# Patient Record
Sex: FEMALE | Race: BLACK OR AFRICAN AMERICAN | Employment: FULL TIME | URBAN - METROPOLITAN AREA
[De-identification: names, ages, dates, MRNs, and addresses within clinical notes are randomized per-mention and may not be internally consistent; named-entity substitution may affect disease eponyms.]

---

## 2017-02-08 ENCOUNTER — OFFICE VISIT (OUTPATIENT)
Dept: FAMILY MEDICINE CLINIC | Age: 52
End: 2017-02-08

## 2017-02-08 VITALS
SYSTOLIC BLOOD PRESSURE: 148 MMHG | HEART RATE: 88 BPM | WEIGHT: 191.4 LBS | DIASTOLIC BLOOD PRESSURE: 74 MMHG | TEMPERATURE: 99.4 F | HEIGHT: 61 IN | RESPIRATION RATE: 18 BRPM | OXYGEN SATURATION: 98 % | BODY MASS INDEX: 36.14 KG/M2

## 2017-02-08 DIAGNOSIS — R05.9 COUGH: Primary | ICD-10-CM

## 2017-02-08 DIAGNOSIS — J32.9 SINUSITIS, UNSPECIFIED CHRONICITY, UNSPECIFIED LOCATION: Primary | ICD-10-CM

## 2017-02-08 RX ORDER — PROMETHAZINE HYDROCHLORIDE AND DEXTROMETHORPHAN HYDROBROMIDE 6.25; 15 MG/5ML; MG/5ML
SYRUP ORAL
Qty: 180 ML | Refills: 1 | Status: SHIPPED | OUTPATIENT
Start: 2017-02-08 | End: 2017-02-17 | Stop reason: ALTCHOICE

## 2017-02-08 RX ORDER — OFLOXACIN 3 MG/ML
3 SOLUTION/ DROPS OPHTHALMIC 4 TIMES DAILY
COMMUNITY
End: 2017-05-16 | Stop reason: ALTCHOICE

## 2017-02-08 RX ORDER — ALBUTEROL SULFATE 90 UG/1
2 AEROSOL, METERED RESPIRATORY (INHALATION)
Qty: 1 INHALER | Refills: 0 | Status: SHIPPED | OUTPATIENT
Start: 2017-02-08 | End: 2017-05-16

## 2017-02-08 RX ORDER — PEN NEEDLE, DIABETIC 32GX 5/32"
NEEDLE, DISPOSABLE MISCELLANEOUS
COMMUNITY
Start: 2016-11-18 | End: 2018-03-28 | Stop reason: SDUPTHER

## 2017-02-08 RX ORDER — BLOOD SUGAR DIAGNOSTIC
STRIP MISCELLANEOUS
COMMUNITY
Start: 2016-11-18 | End: 2018-12-14 | Stop reason: ALTCHOICE

## 2017-02-08 RX ORDER — CODEINE PHOSPHATE AND GUAIFENESIN 10; 100 MG/5ML; MG/5ML
SOLUTION ORAL
Qty: 120 ML | Refills: 0 | Status: SHIPPED | OUTPATIENT
Start: 2017-02-08 | End: 2017-02-17 | Stop reason: ALTCHOICE

## 2017-02-08 RX ORDER — PREDNISOLONE ACETATE 10 MG/ML
1 SUSPENSION/ DROPS OPHTHALMIC 4 TIMES DAILY
COMMUNITY
End: 2017-05-16 | Stop reason: ALTCHOICE

## 2017-02-08 RX ORDER — AZITHROMYCIN 250 MG/1
TABLET, FILM COATED ORAL
Qty: 6 TAB | Refills: 0 | Status: SHIPPED | OUTPATIENT
Start: 2017-02-08 | End: 2017-02-17 | Stop reason: ALTCHOICE

## 2017-02-08 NOTE — MR AVS SNAPSHOT
Visit Information Date & Time Provider Department Dept. Phone Encounter #  
 2/8/2017 10:00 AM Carmina Garcia MD [address] OFF[facility] [phone] [id number] Upcoming Health Maintenance Date Due  
 FOOT EXAM Q1 12/5/1975 EYE EXAM RETINAL OR DILATED Q1 12/5/1975 Pneumococcal 19-64 Medium Risk (1 of 1 - PPSV23) 12/5/1984 PAP AKA CERVICAL CYTOLOGY 12/5/1986 LIPID PANEL Q1 2/25/2011 DTaP/Tdap/Td series (1 - Tdap) 5/5/2013 BREAST CANCER SCRN MAMMOGRAM 12/5/2015 FOBT Q 1 YEAR AGE 50-75 12/5/2015 INFLUENZA AGE 9 TO ADULT 8/1/2016 HEMOGLOBIN A1C Q6M 11/26/2016 MICROALBUMIN Q1 5/26/2017 Allergies as of 2/8/2017  Review Complete On: 2/8/2017 By: Efren Palacio LPN Severity Noted Reaction Type Reactions Percocet [Oxycodone-acetaminophen]  05/04/2013    Other (comments) AMS Current Immunizations  Never Reviewed Name Date Td, Adsorbed PF 5/4/2013  7:32 PM  
  
 Not reviewed this visit You Were Diagnosed With   
  
 Codes Comments Sinusitis, unspecified chronicity, unspecified location    -  Primary ICD-10-CM: J32.9 ICD-9-CM: 473.9 Vitals BP Pulse Temp Resp Height(growth percentile) Weight(growth percentile) 148/74 (BP 1 Location: Right arm, BP Patient Position: Sitting) 88 99.4 °F (37.4 °C) (Oral) 18 5' 1\" (1.549 m) 191 lb 6.4 oz (86.8 kg) SpO2 BMI OB Status Smoking Status 98% 36.16 kg/m2 Hysterectomy Never Smoker Vitals History BMI and BSA Data Body Mass Index Body Surface Area  
 36.16 kg/m 2 1.93 m 2 Preferred Pharmacy Pharmacy Name Phone [facility] [address] [phone] Your Updated Medication List  
  
   
This list is accurate as of: 2/8/17 10:49 AM.  Always use your most recent med list.  
  
  
  
  
 albuterol 90 mcg/actuation inhaler Commonly known as:  PROVENTIL HFA, VENTOLIN HFA, PROAIR HFA Take 2 Puffs by inhalation every six (6) hours as needed for Wheezing. atorvastatin 40 mg tablet Commonly known as:  LIPITOR Take 1 Tab by mouth daily. azithromycin 250 mg tablet Commonly known as:  [place] Take 2 tablets today, then take 1 tablet daily  
  
 cyanocobalamin 1,000 mcg/mL injection Commonly known as:  VITAMIN B12  
1,000 mcg by IntraMUSCular route every thirty (30) days. ergocalciferol 50,000 unit capsule Commonly known as:  ERGOCALCIFEROL Take 50,000 Units by mouth every seven (7) days. ibuprofen 800 mg tablet Commonly known as:  MOTRIN  
1 po bid prn pain with food  
  
 insulin glargine 100 unit/mL (3 mL) pen Commonly known as:  LANTUS SOLOSTAR  
70 units twice daily  
  
 insulin lispro 100 unit/mL kwikpen Commonly known as:  HUMALOG Sliding Scale for Blood Sugar above 300  
  
 lisinopril 20 mg tablet Commonly known as:  Orquidea Sotelo Take 1 Tab by mouth daily. Swapna Pen Needle 32 gauge x 5/32\" Ndle Generic drug:  Insulin Needles (Disposable)  
  
 ofloxacin 0.3 % ophthalmic solution Commonly known as:  FLOXIN Administer 3 Drops to both eyes four (4) times daily. omeprazole 20 mg capsule Commonly known as:  PRILOSEC Take 20 mg by mouth daily. ONETOUCH ULTRA TEST strip Generic drug:  glucose blood VI test strips  
  
 prednisoLONE acetate 1 % ophthalmic suspension Commonly known as:  PRED FORTE Administer 1 Drop to both eyes four (4) times daily. promethazine-dextromethorphan 6.25-15 mg/5 mL syrup Commonly known as:  PROMETHAZINE-DM  
1 teasp po qid prn cough  
  
 raNITIdine 150 mg tablet Commonly known as:  ZANTAC Take 1 Tab by mouth two (2) times a day. Prescriptions Sent to Pharmacy Refills  
 azithromycin (ZITHROMAX) 250 mg tablet 0 Sig: Take 2 tablets today, then take 1 tablet daily  Class: Normal  
 Pharmacy: [facility] [address] [place] [address] Ph #: [phone]  
 promethazine-dextromethorphan (PROMETHAZINE-DM) 6.25-15 mg/5 mL syrup 1 Si teasp po qid prn cough Class: Normal  
 Pharmacy: [facility] [address] [facility] Ph #: [phone]  
 albuterol (PROVENTIL HFA, VENTOLIN HFA, PROAIR HFA) 90 mcg/actuation inhaler 0 Sig: Take 2 Puffs by inhalation every six (6) hours as needed for Wheezing. Class: Normal  
 Pharmacy: [facility] [address], [address] Ph #: [phone] Route: Inhalation Introducing [facility]! Siva Gomez introduces [employer] patient portal. Now you can access parts of your medical record, email your doctor's office, and request medication refills online. 1. In your internet browser, go to [email or web address] 2. Click on the First Time User? Click Here link in the Sign In box. You will see the New Member Sign Up page. 3. Enter your [employer] Access Code exactly as it appears below. You will not need to use this code after youve completed the sign-up process. If you do not sign up before the expiration date, you must request a new code. · [employer] Access Code: [email or web address] Expires: 2017 10:10 AM 
 
4. Enter the last four digits of your Social Security Number (xxxx) and Date of Birth (mm/dd/yyyy) as indicated and click Submit. You will be taken to the next sign-up page. 5. Create a [employer] ID. This will be your [employer] login ID and cannot be changed, so think of one that is secure and easy to remember. 6. Create a [employer] password. You can change your password at any time. 7. Enter your Password Reset Question and Answer. This can be used at a later time if you forget your password. 8. Enter your e-mail address. You will receive e-mail notification when new information is available in [address] 9. Click Sign Up. You can now view and download portions of your medical record. 10. Click the Download Summary menu link to download a portable copy of your medical information. If you have questions, please visit the Frequently Asked Questions section of the [employer] website. Remember, [employer] is NOT to be used for urgent needs. For medical emergencies, dial 911. Now available from your iPhone and Android! Please provide this summary of care documentation to your next provider. Your primary care clinician is listed as Alexander Washburn. If you have any questions after today's visit, please call [phone].

## 2017-02-08 NOTE — TELEPHONE ENCOUNTER
Che [phone], pharm states the EScript on the cough syrup  Does not come in \"sugar free\"  , do they still need it filled?

## 2017-02-08 NOTE — PROGRESS NOTES
Chief Complaint   Patient presents with    Sinus Infection     Has sinus pressure & drainage. Cough & congestion.

## 2017-02-08 NOTE — PROGRESS NOTES
HISTORY OF PRESENT ILLNESS  Darby Montgomery is a 46 y.o. female here with 3 day h/o sinus congestion, facial pressure, ear pressure, non productive cough, occ wheezing and now fever that started today. No sick contacts. She has been taking OTC Advil Cold and Sinus with little relief. She has h/o uncontrolled diabetes and is followed by her endocrinologist Dr. aMxx Roman and just saw her 2 weeks ago, she is still not controlled and they are adjusting her medications and following her closely. Sinus Infection    The history is provided by the patient. This is a new problem. The current episode started more than 2 days ago. The problem has been gradually worsening. Patient reports a subjective fever - was not measured. The fever has been present for less than 1 day. Associated symptoms include cough. Pertinent negatives include no shortness of breath and no chest pain. Review of Systems   Constitutional: Positive for fever. Respiratory: Positive for cough and wheezing. Negative for sputum production and shortness of breath. Cardiovascular: Negative for chest pain. Physical Exam   Constitutional: She is oriented to person, place, and time. She appears well-developed and well-nourished. /74 (BP 1 Location: Right arm, BP Patient Position: Sitting)  Pulse 88  Temp 99.4 °F (37.4 °C) (Oral)   Resp 18  Ht 5' 1\" (1.549 m)  Wt 191 lb 6.4 oz (86.8 kg)  SpO2 98%  BMI 36.16 kg/m2     HENT:   Head: Normocephalic and atraumatic. Mouth/Throat: Oropharynx is clear and moist.   TMs clear bilaterally, reports facial tenderness over maxillary sinus bilaterally right > left   Cardiovascular: Normal heart sounds. Pulmonary/Chest: Breath sounds normal. She has no wheezes. Neurological: She is alert and oriented to person, place, and time. Nursing note and vitals reviewed.     Patient Active Problem List   Diagnosis Code    Type II diabetes mellitus, uncontrolled ([facility] [address] E11.65    Gastroesophageal reflux disease without esophagitis K21.9    History of hysterectomy for benign disease Z90.710     Past Medical History   Diagnosis Date    Diabetes ([facility] [address]      Social History     Social History    Marital status:      Spouse name: N/A    Number of children: N/A    Years of education: N/A     Social History Main Topics    Smoking status: Never Smoker    Smokeless tobacco: Never Used    Alcohol use No    Drug use: No    Sexual activity: Not Asked     Other Topics Concern    None     Social History Narrative     No family history on file. Current Outpatient Prescriptions   Medication Sig    azithromycin (ZITHROMAX) 250 mg tablet Take 2 tablets today, then take 1 tablet daily    promethazine-dextromethorphan (PROMETHAZINE-DM) 6.25-15 mg/5 mL syrup 1 teasp po qid prn cough    albuterol (PROVENTIL HFA, VENTOLIN HFA, PROAIR HFA) 90 mcg/actuation inhaler Take 2 Puffs by inhalation every six (6) hours as needed for Wheezing.  ergocalciferol (ERGOCALCIFEROL) 50,000 unit capsule Take 50,000 Units by mouth every seven (7) days.  cyanocobalamin (VITAMIN B12) 1,000 mcg/mL injection 1,000 mcg by IntraMUSCular route every thirty (30) days.  omeprazole (PRILOSEC) 20 mg capsule Take 20 mg by mouth daily.  atorvastatin (LIPITOR) 40 mg tablet Take 1 Tab by mouth daily.  lisinopril (PRINIVIL, ZESTRIL) 20 mg tablet Take 1 Tab by mouth daily.  insulin glargine (LANTUS SOLOSTAR) 100 unit/mL (3 mL) pen 70 units twice daily    insulin lispro (HUMALOG) 100 unit/mL kwikpen Sliding Scale for Blood Sugar above 300    ibuprofen (MOTRIN) 800 mg tablet 1 po bid prn pain with food    ranitidine (ZANTAC) 150 mg tablet Take 1 Tab by mouth two (2) times a day.  ONETOUCH ULTRA TEST strip     PER PEN NEEDLE 32 gauge x 5/32\" ndle     ofloxacin (FLOXIN) 0.3 % ophthalmic solution Administer 3 Drops to both eyes four (4) times daily.     prednisoLONE acetate (PRED FORTE) 1 % ophthalmic suspension Administer 1 Drop to both eyes four (4) times daily. Allergies   Allergen Reactions    Percocet [Oxycodone-Acetaminophen] Other (comments)     AMS       ASSESSMENT and PLAN  Treatment as documented, cont to follow w Endocrinology. Care plan reviewed and pt understands. After visit summary printed and reviewed with patient. Ami Bullock was seen today for sinus infection. Diagnoses and all orders for this visit:    Sinusitis, unspecified chronicity, unspecified location  -     azithromycin (ZITHROMAX) 250 mg tablet; Take 2 tablets today, then take 1 tablet daily  -     promethazine-dextromethorphan (PROMETHAZINE-DM) 6.25-15 mg/5 mL syrup; 1 teasp po qid prn cough  -     albuterol (PROVENTIL HFA, VENTOLIN HFA, PROAIR HFA) 90 mcg/actuation inhaler; Take 2 Puffs by inhalation every six (6) hours as needed for Wheezing.

## 2017-02-14 ENCOUNTER — TELEPHONE (OUTPATIENT)
Dept: FAMILY MEDICINE CLINIC | Age: 52
End: 2017-02-14

## 2017-02-17 ENCOUNTER — OFFICE VISIT (OUTPATIENT)
Dept: FAMILY MEDICINE CLINIC | Age: 52
End: 2017-02-17

## 2017-02-17 VITALS
WEIGHT: 186 LBS | BODY MASS INDEX: 35.12 KG/M2 | TEMPERATURE: 98.3 F | RESPIRATION RATE: 20 BRPM | HEIGHT: 61 IN | SYSTOLIC BLOOD PRESSURE: 143 MMHG | HEART RATE: 100 BPM | DIASTOLIC BLOOD PRESSURE: 83 MMHG | OXYGEN SATURATION: 98 %

## 2017-02-17 DIAGNOSIS — J40 BRONCHITIS: Primary | ICD-10-CM

## 2017-02-17 DIAGNOSIS — R05.9 COUGH: ICD-10-CM

## 2017-02-17 RX ORDER — PROMETHAZINE HYDROCHLORIDE AND CODEINE PHOSPHATE 6.25; 1 MG/5ML; MG/5ML
5 SOLUTION ORAL
Qty: 240 ML | Refills: 0 | Status: SHIPPED | OUTPATIENT
Start: 2017-02-17 | End: 2017-03-01 | Stop reason: ALTCHOICE

## 2017-02-17 RX ORDER — PREDNISONE 10 MG/1
TABLET ORAL
Qty: 21 TAB | Refills: 0 | Status: SHIPPED | OUTPATIENT
Start: 2017-02-17 | End: 2017-03-01 | Stop reason: ALTCHOICE

## 2017-02-17 NOTE — PATIENT INSTRUCTIONS
Bronchitis: Care Instructions  Your Care Instructions    Bronchitis is inflammation of the bronchial tubes, which carry air to the lungs. The tubes swell and produce mucus, or phlegm. The mucus and inflamed bronchial tubes make you cough. You may have trouble breathing. Most cases of bronchitis are caused by viruses like those that cause colds. Antibiotics usually do not help and they may be harmful. Bronchitis usually develops rapidly and lasts about 2 to 3 weeks in otherwise healthy people. Follow-up care is a key part of your treatment and safety. Be sure to make and go to all appointments, and call your doctor if you are having problems. It's also a good idea to know your test results and keep a list of the medicines you take. How can you care for yourself at home? · Take all medicines exactly as prescribed. Call your doctor if you think you are having a problem with your medicine. · Get some extra rest.  · Take an over-the-counter pain medicine, such as acetaminophen (Tylenol), ibuprofen (Advil, Motrin), or naproxen (Aleve) to reduce fever and relieve body aches. Read and follow all instructions on the label. · Do not take two or more pain medicines at the same time unless the doctor told you to. Many pain medicines have acetaminophen, which is Tylenol. Too much acetaminophen (Tylenol) can be harmful. · Take an over-the-counter cough medicine that contains dextromethorphan to help quiet a dry, hacking cough so that you can sleep. Avoid cough medicines that have more than one active ingredient. Read and follow all instructions on the label. · Breathe moist air from a humidifier, hot shower, or sink filled with hot water. The heat and moisture will thin mucus so you can cough it out. · Do not smoke. Smoking can make bronchitis worse. If you need help quitting, talk to your doctor about stop-smoking programs and medicines. These can increase your chances of quitting for good.   When should you call for help? Call 911 anytime you think you may need emergency care. For example, call if:  · You have severe trouble breathing. Call your doctor now or seek immediate medical care if:  · You have new or worse trouble breathing. · You cough up dark brown or bloody mucus (sputum). · You have a new or higher fever. · You have a new rash. Watch closely for changes in your health, and be sure to contact your doctor if:  · You cough more deeply or more often, especially if you notice more mucus or a change in the color of your mucus. · You are not getting better as expected. Where can you learn more? Go to http://lópez-shiraz.info/. Enter H333 in the search box to learn more about \"Bronchitis: Care Instructions. \"  Current as of: May 23, 2016  Content Version: 11.1  © 9642-9446 [employer]. Care instructions adapted under license by [employer] (which disclaims liability or warranty for this information). If you have questions about a medical condition or this instruction, always ask your healthcare professional. [address] any warranty or liability for your use of this information.

## 2017-02-17 NOTE — PROGRESS NOTES
HISTORY OF PRESENT ILLNESS  Telly Juarez is a 46 y.o. female. HPI  Patient comes in today for cough. Patient's PCP - Dr. Susanna Schroeder. Started with dry cough 1.5 weeks ago with wheezing, dyspnea. Albuterol inhaler with some relief. No fever, chills. Had fever last week but none since. Cough medicine did not help. Nonsmoker, no smoking exposure. No environmental exposures. No hx of asthma or allergies. Some reflux symptoms. Chest and lower stomach feel like on fire. States little better from last week, states sinus pressure better. Cough is worse at night. Just finished zpak ~1 week ago. Allergies   Allergen Reactions    Percocet [Oxycodone-Acetaminophen] Other (comments)     AMS       Past Medical History   Diagnosis Date    Diabetes [facility])        Past Surgical History   Procedure Laterality Date    Hx breast reduction      Hx gyn       c section    Hx colonoscopy         Social History     Social History    Marital status:      Spouse name: N/A    Number of children: N/A    Years of education: N/A     Occupational History    Not on file. Social History Main Topics    Smoking status: Never Smoker    Smokeless tobacco: Never Used    Alcohol use No    Drug use: No    Sexual activity: Not on file     Other Topics Concern    Not on file     Social History Narrative       History reviewed. No pertinent family history. Current Outpatient Prescriptions   Medication Sig    ONETOUCH ULTRA TEST strip     PER PEN NEEDLE 32 gauge x 5/32\" ndle     ofloxacin (FLOXIN) 0.3 % ophthalmic solution Administer 3 Drops to both eyes four (4) times daily.  prednisoLONE acetate (PRED FORTE) 1 % ophthalmic suspension Administer 1 Drop to both eyes four (4) times daily.     promethazine-dextromethorphan (PROMETHAZINE-DM) 6.25-15 mg/5 mL syrup 1 teasp po qid prn cough    albuterol (PROVENTIL HFA, VENTOLIN HFA, PROAIR HFA) 90 mcg/actuation inhaler Take 2 Puffs by inhalation every six (6) hours as needed for Wheezing.  guaiFENesin-codeine (ROBITUSSIN AC) 100-10 mg/5 mL solution 1 or 2 teasp qhs prn cough    ergocalciferol (ERGOCALCIFEROL) 50,000 unit capsule Take 50,000 Units by mouth every seven (7) days.  cyanocobalamin (VITAMIN B12) 1,000 mcg/mL injection 1,000 mcg by IntraMUSCular route every thirty (30) days.  omeprazole (PRILOSEC) 20 mg capsule Take 20 mg by mouth daily.  atorvastatin (LIPITOR) 40 mg tablet Take 1 Tab by mouth daily.  lisinopril (PRINIVIL, ZESTRIL) 20 mg tablet Take 1 Tab by mouth daily.  insulin glargine (LANTUS SOLOSTAR) 100 unit/mL (3 mL) pen 70 units twice daily    insulin lispro (HUMALOG) 100 unit/mL kwikpen Sliding Scale for Blood Sugar above 300    ibuprofen (MOTRIN) 800 mg tablet 1 po bid prn pain with food    ranitidine (ZANTAC) 150 mg tablet Take 1 Tab by mouth two (2) times a day. No current facility-administered medications for this visit. Review of Systems   Constitutional: Negative for chills, diaphoresis, fever and malaise/fatigue. HENT: Negative for congestion, ear pain, sore throat and tinnitus. Respiratory: Positive for cough, shortness of breath and wheezing. Negative for hemoptysis and sputum production. Cardiovascular: Negative for chest pain, palpitations and leg swelling. Gastrointestinal: Negative for abdominal pain, diarrhea, nausea and vomiting. Genitourinary: Negative for dysuria, frequency and urgency. Musculoskeletal: Negative for myalgias. Skin: Negative. Neurological: Negative for dizziness and headaches. Endo/Heme/Allergies: Negative for environmental allergies. Vitals:    02/17/17 1548   BP: 143/83   Pulse: 100   Resp: 20   Temp: 98.3 °F (36.8 °C)   TempSrc: Oral   SpO2: 98%   Weight: 186 lb (84.4 kg)   Height: 5' 1\" (1.549 m)     Physical Exam   Constitutional: She is oriented to person, place, and time. Vital signs are normal. She appears well-developed and well-nourished.  She is cooperative. HENT:   Right Ear: Hearing, tympanic membrane, external ear and ear canal normal.   Left Ear: Hearing, tympanic membrane, external ear and ear canal normal.   Nose: Nose normal. Right sinus exhibits no maxillary sinus tenderness and no frontal sinus tenderness. Left sinus exhibits no maxillary sinus tenderness and no frontal sinus tenderness. Mouth/Throat: Uvula is midline, oropharynx is clear and moist and mucous membranes are normal. Mucous membranes are not pale and not dry. No oropharyngeal exudate, posterior oropharyngeal edema or posterior oropharyngeal erythema. Cardiovascular: Normal rate, regular rhythm, S1 normal, S2 normal and normal heart sounds. Pulmonary/Chest: Effort normal. She has no decreased breath sounds. She has wheezes (few scattered expiratory wheezes). She has no rhonchi. She has no rales. Bronchospastic cough   Lymphadenopathy:        Head (right side): No submental, no submandibular, no tonsillar, no preauricular and no posterior auricular adenopathy present. Head (left side): No submental, no submandibular, no tonsillar, no preauricular and no posterior auricular adenopathy present. She has no cervical adenopathy. Right: No supraclavicular adenopathy present. Left: No supraclavicular adenopathy present. Neurological: She is alert and oriented to person, place, and time. Skin: Skin is warm, dry and intact. Psychiatric: She has a normal mood and affect. Her speech is normal and behavior is normal. Thought content normal.   Vitals reviewed. ASSESSMENT and PLAN    ICD-10-CM ICD-9-CM    1. Bronchitis J40 490 predniSONE (STERAPRED DS) 10 mg dose pack      promethazine-codeine (PHENERGAN WITH CODEINE) 6.25-10 mg/5 mL syrup   2. Cough R05 786.2 XR CHEST PA LAT     Encounter Diagnoses   Name Primary?     Bronchitis Yes    Cough      Orders Placed This Encounter    XR CHEST PA LAT    predniSONE (STERAPRED DS) 10 mg dose pack    promethazine-codeine (PHENERGAN WITH CODEINE) 6.25-10 mg/5 mL syrup     Tessy was seen today for cold symptoms. Diagnoses and all orders for this visit:    Bronchitis - patient just completed zpak, will order steroid taper, cough suppressant. RTC or go to ED if no improvement or condition worsens. -     predniSONE (STERAPRED DS) 10 mg dose pack; See administration instruction per 10mg dose pack  -     promethazine-codeine (PHENERGAN WITH CODEINE) 6.25-10 mg/5 mL syrup; Take 5 mL by mouth every six (6) hours as needed for Cough. Max Daily Amount: 20 mL. Cough  -     XR CHEST PA LAT; Future      Follow-up Disposition:  Return if symptoms worsen or fail to improve. radiology results and schedule of future radiology studies reviewed with patient    I have reviewed the patient's allergies and made any necessary changes. Medical, procedural, social and family histories have been reviewed and updated as medically indicated. I have reconciled and/or revised patient medications in the EMR. I have discussed each diagnosis listed in this note with Yadiel Park and/or their family. I have discussed treatment options and the risk/benefit analysis of those options, including safe use of medications and possible medication side effects. Through the use of shared decision making we have agreed to the above plan. The patient has received an after-visit summary and questions were answered concerning future plans. Katrina Nathan, BRYSON-C    This note will not be viewable in [employer].

## 2017-02-17 NOTE — MR AVS SNAPSHOT
Visit Information Date & Time Provider Department Dept. Phone Encounter #  
 2/17/2017  3:30 PM Manolo Yeung [phone] [id number] Follow-up Instructions Return if symptoms worsen or fail to improve. Upcoming Health Maintenance Date Due  
 FOOT EXAM Q1 12/5/1975 EYE EXAM RETINAL OR DILATED Q1 12/5/1975 PAP AKA CERVICAL CYTOLOGY 12/5/1986 LIPID PANEL Q1 2/25/2011 BREAST CANCER SCRN MAMMOGRAM 12/5/2015 FOBT Q 1 YEAR AGE 50-75 12/5/2015 HEMOGLOBIN A1C Q6M 11/26/2016 Pneumococcal 19-64 Medium Risk (1 of 1 - PPSV23) 3/31/2017* DTaP/Tdap/Td series (1 - Tdap) 3/31/2017* MICROALBUMIN Q1 5/26/2017 *Topic was postponed. The date shown is not the original due date. Allergies as of 2/17/2017  Review Complete On: 2/17/2017 By: Gail Puente LPN Severity Noted Reaction Type Reactions Percocet [Oxycodone-acetaminophen]  05/04/2013    Other (comments) AMS Current Immunizations  Never Reviewed Name Date Td, Adsorbed PF 5/4/2013  7:32 PM  
  
 Not reviewed this visit You Were Diagnosed With   
  
 Codes Comments Bronchitis    -  Primary ICD-10-CM: Z00 ICD-9-CM: 304 Cough     ICD-10-CM: R05 ICD-9-CM: 376. 2 Vitals BP Pulse Temp Resp Height(growth percentile) Weight(growth percentile) 143/83 100 98.3 °F (36.8 °C) (Oral) 20 5' 1\" (1.549 m) 186 lb (84.4 kg) SpO2 BMI OB Status Smoking Status 98% 35.14 kg/m2 Hysterectomy Never Smoker BMI and BSA Data Body Mass Index Body Surface Area  
 35.14 kg/m 2 1.91 m 2 Preferred Pharmacy Pharmacy Name Phone [facility] [address], [address] [place] [address] [phone] Your Updated Medication List  
  
   
This list is accurate as of: 2/17/17  4:16 PM.  Always use your most recent med list.  
  
  
  
  
 albuterol 90 mcg/actuation inhaler Commonly known as:  PROVENTIL HFA, VENTOLIN HFA, PROAIR HFA Take 2 Puffs by inhalation every six (6) hours as needed for Wheezing. atorvastatin 40 mg tablet Commonly known as:  LIPITOR Take 1 Tab by mouth daily. cyanocobalamin 1,000 mcg/mL injection Commonly known as:  VITAMIN B12  
1,000 mcg by IntraMUSCular route every thirty (30) days. ergocalciferol 50,000 unit capsule Commonly known as:  ERGOCALCIFEROL Take 50,000 Units by mouth every seven (7) days. ibuprofen 800 mg tablet Commonly known as:  MOTRIN  
1 po bid prn pain with food  
  
 insulin glargine 100 unit/mL (3 mL) pen Commonly known as:  LANTUS SOLOSTAR  
70 units twice daily  
  
 insulin lispro 100 unit/mL kwikpen Commonly known as:  HUMALOG Sliding Scale for Blood Sugar above 300  
  
 lisinopril 20 mg tablet Commonly known as:  Mera Reasons Take 1 Tab by mouth daily. Swapna Pen Needle 32 gauge x 5/32\" Ndle Generic drug:  Insulin Needles (Disposable)  
  
 ofloxacin 0.3 % ophthalmic solution Commonly known as:  FLOXIN Administer 3 Drops to both eyes four (4) times daily. omeprazole 20 mg capsule Commonly known as:  PRILOSEC Take 20 mg by mouth daily. ONETOUCH ULTRA TEST strip Generic drug:  glucose blood VI test strips  
  
 prednisoLONE acetate 1 % ophthalmic suspension Commonly known as:  PRED FORTE Administer 1 Drop to both eyes four (4) times daily. predniSONE 10 mg dose pack Commonly known as:  STERAPRED DS See administration instruction per 10mg dose pack  
  
 promethazine-codeine 6.25-10 mg/5 mL syrup Commonly known as:  PHENERGAN with CODEINE Take 5 mL by mouth every six (6) hours as needed for Cough. Max Daily Amount: 20 mL. raNITIdine 150 mg tablet Commonly known as:  ZANTAC Take 1 Tab by mouth two (2) times a day. Prescriptions Printed Refills promethazine-codeine (PHENERGAN WITH CODEINE) 6.25-10 mg/5 mL syrup 0 Sig: Take 5 mL by mouth every six (6) hours as needed for Cough. Max Daily Amount: 20 mL. Class: Print Route: Oral  
  
Prescriptions Sent to Pharmacy Refills  
 predniSONE (STERAPRED DS) 10 mg dose pack 0 Sig: See administration instruction per 10mg dose pack Class: Normal  
 Pharmacy: [facility] [address] [place] [address] [facility] #: [phone] Follow-up Instructions Return if symptoms worsen or fail to improve. To-Do List   
 02/17/2017 Imaging:  XR CHEST PA LAT Patient Instructions Bronchitis: Care Instructions Your Care Instructions Bronchitis is inflammation of the bronchial tubes, which carry air to the lungs. The tubes swell and produce mucus, or phlegm. The mucus and inflamed bronchial tubes make you cough. You may have trouble breathing. Most cases of bronchitis are caused by viruses like those that cause colds. Antibiotics usually do not help and they may be harmful. Bronchitis usually develops rapidly and lasts about 2 to 3 weeks in otherwise healthy people. Follow-up care is a key part of your treatment and safety. Be sure to make and go to all appointments, and call your doctor if you are having problems. It's also a good idea to know your test results and keep a list of the medicines you take. How can you care for yourself at home? · Take all medicines exactly as prescribed. Call your doctor if you think you are having a problem with your medicine. · Get some extra rest. 
· Take an over-the-counter pain medicine, such as acetaminophen (Tylenol), ibuprofen (Advil, Motrin), or naproxen (Aleve) to reduce fever and relieve body aches. Read and follow all instructions on the label.  
· Do not take two or more pain medicines at the same time unless the doctor told you to. Many pain medicines have acetaminophen, which is Tylenol. Too much acetaminophen (Tylenol) can be harmful. · Take an over-the-counter cough medicine that contains dextromethorphan to help quiet a dry, hacking cough so that you can sleep. Avoid cough medicines that have more than one active ingredient. Read and follow all instructions on the label. · Breathe moist air from a humidifier, hot shower, or sink filled with hot water. The heat and moisture will thin mucus so you can cough it out. · Do not smoke. Smoking can make bronchitis worse. If you need help quitting, talk to your doctor about stop-smoking programs and medicines. These can increase your chances of quitting for good. When should you call for help? Call 911 anytime you think you may need emergency care. For example, call if: 
· You have severe trouble breathing. Call your doctor now or seek immediate medical care if: 
· You have new or worse trouble breathing. · You cough up dark brown or bloody mucus (sputum). · You have a new or higher fever. · You have a new rash. Watch closely for changes in your health, and be sure to contact your doctor if: 
· You cough more deeply or more often, especially if you notice more mucus or a change in the color of your mucus. · You are not getting better as expected. Where can you learn more? Go to http://lópez-shiraz.info/. Enter H333 in the search box to learn more about \"Bronchitis: Care Instructions. \" Current as of: May 23, 2016 Content Version: 11.1 © 3508-5711 [employer]. Care instructions adapted under license by [employer] (which disclaims liability or warranty for this information). If you have questions about a medical condition or this instruction, always ask your healthcare professional. [place] [address] any warranty or liability for your use of this information. Introducing [facility]! [employer] introduces [facility] patient portal. Now you can access parts of your medical record, email your doctor's office, and request medication refills online. 1. In your internet browser, go to [email or web address] 2. Click on the First Time User? Click Here link in the Sign In box. You will see the New Member Sign Up page. 3. Enter your [facility] Access Code exactly as it appears below. You will not need to use this code after youve completed the sign-up process. If you do not sign up before the expiration date, you must request a new code. · [facility] Access Code: [email or web address] Expires: 5/9/2017 10:10 AM 
 
4. Enter the last four digits of your Social Security Number (xxxx) and Date of Birth (mm/dd/yyyy) as indicated and click Submit. You will be taken to the next sign-up page. 5. Create a [facility] ID. This will be your [facility] login ID and cannot be changed, so think of one that is secure and easy to remember. 6. Create a [facility] password. You can change your password at any time. 7. Enter your Password Reset Question and Answer. This can be used at a later time if you forget your password. 8. Enter your e-mail address. You will receive e-mail notification when new information is available in [address] 9. Click Sign Up. You can now view and download portions of your medical record. 10. Click the Download Summary menu link to download a portable copy of your medical information. If you have questions, please visit the Frequently Asked Questions section of the [facility] website. Remember, [facility] is NOT to be used for urgent needs. For medical emergencies, dial 911. Now available from your iPhone and Android! Please provide this summary of care documentation to your next provider. Your primary care clinician is listed as Tisha Shelton. If you have any questions after today's visit, please call [phone].

## 2017-02-28 ENCOUNTER — TELEPHONE (OUTPATIENT)
Dept: FAMILY MEDICINE CLINIC | Age: 52
End: 2017-02-28

## 2017-02-28 NOTE — TELEPHONE ENCOUNTER
Pt would like a return call to see if the fax for her eye surgery was received  Pt ph number is [phone]

## 2017-02-28 NOTE — TELEPHONE ENCOUNTER
Spoke with patient. She needed a pre-op exam this week for surgery. Explained no appt available this week. Asked when she got form & knew of surgery. She said over 3 weeks ago. Referred call to [facility]. Patient agreed to appt 3-14-17.

## 2017-03-01 ENCOUNTER — OFFICE VISIT (OUTPATIENT)
Dept: FAMILY MEDICINE CLINIC | Age: 52
End: 2017-03-01

## 2017-03-01 VITALS
BODY MASS INDEX: 35.91 KG/M2 | OXYGEN SATURATION: 99 % | DIASTOLIC BLOOD PRESSURE: 79 MMHG | SYSTOLIC BLOOD PRESSURE: 143 MMHG | HEART RATE: 90 BPM | HEIGHT: 61 IN | TEMPERATURE: 97.6 F | WEIGHT: 190.2 LBS | RESPIRATION RATE: 16 BRPM

## 2017-03-01 DIAGNOSIS — Z01.818 PRE-OP EXAM: Primary | ICD-10-CM

## 2017-03-01 LAB
GLUCOSE POC: 166 MG/DL
HBA1C MFR BLD HPLC: 11.6 %

## 2017-03-01 RX ORDER — TIZANIDINE 4 MG/1
TABLET ORAL
Refills: 0 | COMMUNITY
Start: 2017-02-08 | End: 2017-03-01 | Stop reason: ALTCHOICE

## 2017-03-01 RX ORDER — AZITHROMYCIN 250 MG/1
TABLET, FILM COATED ORAL
Refills: 0 | COMMUNITY
Start: 2017-02-08 | End: 2017-03-01 | Stop reason: ALTCHOICE

## 2017-03-01 NOTE — MR AVS SNAPSHOT
Visit Information Date & Time Provider Department Dept. Phone Encounter #  
 3/1/2017  8:00 AM Angélica Arreaga MD [address] [facility] [phone] [id number] Follow-up Instructions Return in about 1 month (around 4/1/2017) for follow up with fasting labs. Upcoming Health Maintenance Date Due  
 FOOT EXAM Q1 12/5/1975 EYE EXAM RETINAL OR DILATED Q1 12/5/1975 PAP AKA CERVICAL CYTOLOGY 12/5/1986 LIPID PANEL Q1 2/25/2011 FOBT Q 1 YEAR AGE 50-75 12/5/2015 HEMOGLOBIN A1C Q6M 11/26/2016 Pneumococcal 19-64 Medium Risk (1 of 1 - PPSV23) 3/31/2017* DTaP/Tdap/Td series (1 - Tdap) 3/31/2017* MICROALBUMIN Q1 5/26/2017 BREAST CANCER SCRN MAMMOGRAM 1/1/2018 *Topic was postponed. The date shown is not the original due date. Allergies as of 3/1/2017  Review Complete On: 3/1/2017 By: Ladonna Dennis LPN Severity Noted Reaction Type Reactions Percocet [Oxycodone-acetaminophen]  05/04/2013    Other (comments) AMS Current Immunizations  Never Reviewed Name Date Td, Adsorbed PF 5/4/2013  7:32 PM  
  
 Not reviewed this visit You Were Diagnosed With   
  
 Codes Comments Pre-op exam    -  Primary ICD-10-CM: H55.287 ICD-9-CM: V72.84 Vitals BP  
  
  
  
  
  
 143/79 (BP 1 Location: Right arm, BP Patient Position: Sitting) Vitals History BMI and BSA Data Body Mass Index Body Surface Area 35.94 kg/m 2 1.93 m 2 Preferred Pharmacy Pharmacy Name Phone [facility] [address] [phone] Your Updated Medication List  
  
   
This list is accurate as of: 3/1/17 10:49 AM.  Always use your most recent med list.  
  
  
  
  
 albuterol 90 mcg/actuation inhaler Commonly known as:  PROVENTIL HFA, VENTOLIN HFA, PROAIR HFA Take 2 Puffs by inhalation every six (6) hours as needed for Wheezing. atorvastatin 40 mg tablet Commonly known as:  LIPITOR Take 1 Tab by mouth daily. cyanocobalamin 1,000 mcg/mL injection Commonly known as:  VITAMIN B12  
1,000 mcg by IntraMUSCular route every thirty (30) days. ergocalciferol 50,000 unit capsule Commonly known as:  ERGOCALCIFEROL Take 50,000 Units by mouth every seven (7) days. ibuprofen 800 mg tablet Commonly known as:  MOTRIN  
1 po bid prn pain with food  
  
 insulin glargine 100 unit/mL (3 mL) pen Commonly known as:  LANTUS SOLOSTAR  
70 units twice daily  
  
 insulin lispro 100 unit/mL kwikpen Commonly known as:  HUMALOG Sliding Scale for Blood Sugar above 300  
  
 lisinopril 20 mg tablet Commonly known as:  Mark Shutters Take 1 Tab by mouth daily. Swapna Pen Needle 32 gauge x 5/32\" Ndle Generic drug:  Insulin Needles (Disposable)  
  
 ofloxacin 0.3 % ophthalmic solution Commonly known as:  FLOXIN Administer 3 Drops to both eyes four (4) times daily. omeprazole 20 mg capsule Commonly known as:  PRILOSEC Take 20 mg by mouth daily. ONETOUCH ULTRA TEST strip Generic drug:  glucose blood VI test strips  
  
 prednisoLONE acetate 1 % ophthalmic suspension Commonly known as:  PRED FORTE Administer 1 Drop to both eyes four (4) times daily. raNITIdine 150 mg tablet Commonly known as:  ZANTAC Take 1 Tab by mouth two (2) times a day. We Performed the Following AMB POC GLUCOSE BLOOD, BY GLUCOSE MONITORING DEVICE [78594 CPT(R)] AMB POC HEMOGLOBIN A1C [58849 CPT(R)] Follow-up Instructions Return in about 1 month (around 4/1/2017) for follow up with fasting labs. Introducing [facility]! Dominique Yousif introduces [employer] patient portal. Now you can access parts of your medical record, email your doctor's office, and request medication refills online. 1. In your internet browser, go to [email or web address] 2. Click on the First Time User? Click Here link in the Sign In box. You will see the New Member Sign Up page. 3. Enter your [email or web address] Access Code exactly as it appears below. You will not need to use this code after youve completed the sign-up process. If you do not sign up before the expiration date, you must request a new code. · [employer] Access Code: [email or web address] Expires: 5/9/2017 10:10 AM 
 
4. Enter the last four digits of your Social Security Number (xxxx) and Date of Birth (mm/dd/yyyy) as indicated and click Submit. You will be taken to the next sign-up page. 5. Create a [employer] ID. This will be your [employer] login ID and cannot be changed, so think of one that is secure and easy to remember. 6. Create a [email or web address] password. You can change your password at any time. 7. Enter your Password Reset Question and Answer. This can be used at a later time if you forget your password. 8. Enter your e-mail address. You will receive e-mail notification when new information is available in [address] 9. Click Sign Up. You can now view and download portions of your medical record. 10. Click the Download Summary menu link to download a portable copy of your medical information. If you have questions, please visit the Frequently Asked Questions section of the [email or web address] website. Remember, [email or web address] is NOT to be used for urgent needs. For medical emergencies, dial 911. Now available from your iPhone and Android! Please provide this summary of care documentation to your next provider. Your primary care clinician is listed as Ansley Faustin. If you have any questions after today's visit, please call [phone].

## 2017-03-01 NOTE — PROGRESS NOTES
HISTORY OF PRESENT ILLNESS  May Hightower is a 46 y.o. female here today for preop eval prior to eye surgery with Dr. Tracy Poe. I evaluated pt for the first time back in May 2016, her A1c at that time was 12.1%. Since that time she has been seeing her endocrinologist Dr. Saturnino Edwards and is still trying to get her diabetes under control w her Humalog and Lantus. Pt is describing lows in the 60 range and is symptomatic. Next appt with Dr. Saturnino Edwards is the end of this month and she is sending in home glucose readings to her in the interim. Pt has labs with Dr. Saturnino Edwards but doesn't know which ones, I would like her to either have those labs sent over to me or come in for follow up with me fasting so that we can check lipids, renal function and liver function on statin. Today her glucose is 166 and A1c is 11.6%. I have communicated this with Dr. Adrian Calloway today by phone. BP is okay but borderline in the 150/80 range. Pre-op Exam   The history is provided by the patient. Pertinent negatives include no chest pain and no shortness of breath. Review of Systems   Constitutional: Negative for fever. Respiratory: Negative for shortness of breath. Cardiovascular: Negative for chest pain. Gastrointestinal: Negative. Neurological: Negative. The remainder of systems are reviewed and are negative. Physical Exam   Constitutional: She is oriented to person, place, and time. She appears well-developed and well-nourished. /79 (BP 1 Location: Right arm, BP Patient Position: Sitting)  Pulse 90  Temp 97.6 °F (36.4 °C) (Oral)   Resp 16  Ht 5' 1\" (1.549 m)  Wt 190 lb 3.2 oz (86.3 kg)  LMP  (LMP Unknown)  SpO2 99%  BMI 35.94 kg/m2     HENT:   Head: Normocephalic and atraumatic. Neck: No thyromegaly present. Cardiovascular: Normal heart sounds. Pulmonary/Chest: Breath sounds normal.   Abdominal: Soft. There is no tenderness. Musculoskeletal: She exhibits no edema.    Lymphadenopathy:     She has no cervical adenopathy. Neurological: She is alert and oriented to person, place, and time. Nursing note and vitals reviewed. Patient Active Problem List   Diagnosis Code    Type II diabetes mellitus, uncontrolled ([facility] E11.65    Gastroesophageal reflux disease without esophagitis K21.9    History of hysterectomy for benign disease Z90.710     Past Medical History:   Diagnosis Date    Diabetes ([facility]      Social History     Social History    Marital status:      Spouse name: N/A    Number of children: N/A    Years of education: N/A     Social History Main Topics    Smoking status: Never Smoker    Smokeless tobacco: Never Used    Alcohol use No    Drug use: No    Sexual activity: Not Asked     Other Topics Concern    None     Social History Narrative     No family history on file. Current Outpatient Prescriptions   Medication Sig    ONETOUCH ULTRA TEST strip     PER PEN NEEDLE 32 gauge x 5/32\" ndle     albuterol (PROVENTIL HFA, VENTOLIN HFA, PROAIR HFA) 90 mcg/actuation inhaler Take 2 Puffs by inhalation every six (6) hours as needed for Wheezing.  ergocalciferol (ERGOCALCIFEROL) 50,000 unit capsule Take 50,000 Units by mouth every seven (7) days.  cyanocobalamin (VITAMIN B12) 1,000 mcg/mL injection 1,000 mcg by IntraMUSCular route every thirty (30) days.  omeprazole (PRILOSEC) 20 mg capsule Take 20 mg by mouth daily.  atorvastatin (LIPITOR) 40 mg tablet Take 1 Tab by mouth daily.  lisinopril (PRINIVIL, ZESTRIL) 20 mg tablet Take 1 Tab by mouth daily.  insulin glargine (LANTUS SOLOSTAR) 100 unit/mL (3 mL) pen 70 units twice daily    insulin lispro (HUMALOG) 100 unit/mL kwikpen Sliding Scale for Blood Sugar above 300    ibuprofen (MOTRIN) 800 mg tablet 1 po bid prn pain with food    ranitidine (ZANTAC) 150 mg tablet Take 1 Tab by mouth two (2) times a day.  ofloxacin (FLOXIN) 0.3 % ophthalmic solution Administer 3 Drops to both eyes four (4) times daily.     prednisoLONE acetate (PRED FORTE) 1 % ophthalmic suspension Administer 1 Drop to both eyes four (4) times daily. Allergies   Allergen Reactions    Percocet [Oxycodone-Acetaminophen] Other (comments)     AMS       ASSESSMENT and PLAN  Uncontrolled diabetes affects her post surgical healing as I have discussed w Dr. Arely Esquivel and with patient. Asked pt to return for eval with labs 1mth, earlier if needed. Care plan reviewed and pt understands. After visit summary printed and reviewed with patient. Kristine Boyd was seen today for pre-op exam.    Diagnoses and all orders for this visit:    Pre-op exam  -     AMB POC GLUCOSE BLOOD, BY GLUCOSE MONITORING DEVICE  -     AMB POC HEMOGLOBIN A1C      Follow-up Disposition:  Return in about 1 month (around 4/1/2017) for follow up with fasting labs.   lab results and schedule of future lab studies reviewed with patient  specific diabetic recommendations: foot care discussed and Podiatry visits discussed, annual eye examinations at Ophthalmology discussed, glycohemoglobin and other lab monitoring discussed and long term diabetic complications discussed

## 2017-03-02 ENCOUNTER — TELEPHONE (OUTPATIENT)
Dept: FAMILY MEDICINE CLINIC | Age: 52
End: 2017-03-02

## 2017-03-02 NOTE — TELEPHONE ENCOUNTER
----- Message from Gael Torres sent at 3/2/2017 11:29 AM EST -----  Regarding: Dr. Gladys Page  Pt is returning a call back to practice. Best contact number [phone].

## 2017-04-27 ENCOUNTER — TELEPHONE (OUTPATIENT)
Dept: FAMILY MEDICINE CLINIC | Age: 52
End: 2017-04-27

## 2017-04-27 NOTE — TELEPHONE ENCOUNTER
----- Message from Vinicius Richardson sent at 4/27/2017 12:07 PM EDT -----  Regarding: Dr. Venkat Wells  Pt request a call regarding getting lab work. Pt can be reached at  [phone].

## 2017-05-16 ENCOUNTER — OFFICE VISIT (OUTPATIENT)
Dept: FAMILY MEDICINE CLINIC | Age: 52
End: 2017-05-16

## 2017-05-16 VITALS
SYSTOLIC BLOOD PRESSURE: 166 MMHG | TEMPERATURE: 98.1 F | HEIGHT: 61 IN | RESPIRATION RATE: 18 BRPM | DIASTOLIC BLOOD PRESSURE: 80 MMHG | WEIGHT: 199.8 LBS | HEART RATE: 86 BPM | BODY MASS INDEX: 37.72 KG/M2 | OXYGEN SATURATION: 99 %

## 2017-05-16 DIAGNOSIS — E10.319: Primary | ICD-10-CM

## 2017-05-16 DIAGNOSIS — E10.65: Primary | ICD-10-CM

## 2017-05-16 DIAGNOSIS — E78.2 MIXED HYPERLIPIDEMIA: ICD-10-CM

## 2017-05-16 DIAGNOSIS — Z79.4 ENCOUNTER FOR LONG-TERM (CURRENT) USE OF INSULIN (HCC): ICD-10-CM

## 2017-05-16 DIAGNOSIS — I10 ESSENTIAL HYPERTENSION: ICD-10-CM

## 2017-05-16 DIAGNOSIS — K21.9 GASTROESOPHAGEAL REFLUX DISEASE WITHOUT ESOPHAGITIS: ICD-10-CM

## 2017-05-16 DIAGNOSIS — Z23 ENCOUNTER FOR IMMUNIZATION: ICD-10-CM

## 2017-05-16 DIAGNOSIS — E53.8 B12 DEFICIENCY: ICD-10-CM

## 2017-05-16 DIAGNOSIS — Z51.81 ENCOUNTER FOR MEDICATION MONITORING: ICD-10-CM

## 2017-05-16 DIAGNOSIS — Z12.31 ENCOUNTER FOR SCREENING MAMMOGRAM FOR BREAST CANCER: ICD-10-CM

## 2017-05-16 LAB
BILIRUB UR QL STRIP: NEGATIVE
GLUCOSE POC: ABNORMAL MG/DL
GLUCOSE UR-MCNC: NORMAL MG/DL
HBA1C MFR BLD HPLC: 8.3 %
KETONES P FAST UR STRIP-MCNC: NEGATIVE MG/DL
PH UR STRIP: 6 [PH] (ref 4.6–8)
PROT UR QL STRIP: NEGATIVE MG/DL
SP GR UR STRIP: 1.01 (ref 1–1.03)
UA UROBILINOGEN AMB POC: NORMAL (ref 0.2–1)
URINALYSIS CLARITY POC: CLEAR
URINALYSIS COLOR POC: YELLOW
URINE BLOOD POC: NEGATIVE
URINE LEUKOCYTES POC: NEGATIVE
URINE NITRITES POC: NEGATIVE

## 2017-05-16 RX ORDER — PREDNISONE 10 MG/1
TABLET ORAL
Refills: 0 | COMMUNITY
Start: 2017-02-17 | End: 2017-05-16 | Stop reason: ALTCHOICE

## 2017-05-16 RX ORDER — PROMETHAZINE HYDROCHLORIDE AND CODEINE PHOSPHATE 6.25; 1 MG/5ML; MG/5ML
5 SOLUTION ORAL
Refills: 0 | COMMUNITY
Start: 2017-02-17 | End: 2017-05-16

## 2017-05-16 RX ORDER — PANTOPRAZOLE SODIUM 40 MG/1
40 TABLET, DELAYED RELEASE ORAL DAILY
Qty: 30 TAB | Refills: 6 | Status: SHIPPED | OUTPATIENT
Start: 2017-05-16 | End: 2017-12-30 | Stop reason: SDUPTHER

## 2017-05-16 RX ORDER — OMEPRAZOLE 20 MG/1
20 CAPSULE, DELAYED RELEASE ORAL DAILY
Qty: 90 CAP | Refills: 3 | Status: SHIPPED | OUTPATIENT
Start: 2017-05-16 | End: 2017-05-16 | Stop reason: ALTCHOICE

## 2017-05-16 RX ORDER — IBUPROFEN 800 MG/1
800 TABLET ORAL
Qty: 60 TAB | Refills: 2 | Status: SHIPPED | OUTPATIENT
Start: 2017-05-16 | End: 2017-12-02 | Stop reason: SDUPTHER

## 2017-05-16 NOTE — MR AVS SNAPSHOT
Visit Information Date & Time Provider Department Dept. Phone Encounter #  
 5/16/2017  2:45 PM Apple BaronManolo [phone] [id number] Follow-up Instructions Return in about 3 months (around 8/16/2017). Upcoming Health Maintenance Date Due  
 FOOT EXAM Q1 12/5/1975 Pneumococcal 19-64 Medium Risk (1 of 1 - PPSV23) 12/5/1984 LIPID PANEL Q1 2/25/2011 DTaP/Tdap/Td series (1 - Tdap) 5/5/2013 FOBT Q 1 YEAR AGE 50-75 12/5/2015 MICROALBUMIN Q1 5/26/2017 INFLUENZA AGE 9 TO ADULT 8/1/2017 HEMOGLOBIN A1C Q6M 9/1/2017 BREAST CANCER SCRN MAMMOGRAM 1/1/2018 EYE EXAM RETINAL OR DILATED Q1 2/7/2018 PAP AKA CERVICAL CYTOLOGY 10/12/2018 Allergies as of 5/16/2017  Review Complete On: 5/16/2017 By: Apple Baron MD  
  
 Severity Noted Reaction Type Reactions Percocet [Oxycodone-acetaminophen]  05/04/2013    Other (comments) AMS Current Immunizations  Reviewed on 5/16/2017 Name Date Pneumococcal Polysaccharide (PPSV-23) 5/16/2017 Td, Adsorbed PF 5/4/2013  7:32 PM  
  
 Reviewed by Apple Baron MD on 5/16/2017 at  3:32 PM  
You Were Diagnosed With   
  
 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 
ICD-9-CM: 401.9 Mixed hyperlipidemia     ICD-10-CM: E78.2 ICD-9-CM: 272.2 Gastroesophageal reflux disease without esophagitis     ICD-10-CM: K21.9 ICD-9-CM: 530.81 Encounter for medication monitoring     ICD-10-CM: Z51.81 
ICD-9-CM: V58.83 Encounter for long-term (current) use of insulin ([facility] [address]     ICD-10-CM: Z79.4 ICD-9-CM: V58.67   
 B12 deficiency     ICD-10-CM: E53.8 ICD-9-CM: 266.2 Encounter for immunization     ICD-10-CM: D53 ICD-9-CM: V03.89   
  
 Vitals BP Pulse Temp Resp Height(growth percentile) Weight(growth percentile) 166/80 (BP 1 Location: Left arm, BP Patient Position: Sitting) 86 98.1 °F (36.7 °C) (Oral) 18 5' 1\" (1.549 m) 199 lb 12.8 oz (90.6 kg) LMP SpO2 BMI OB Status Smoking Status (LMP Unknown) 99% 37.75 kg/m2 Hysterectomy Never Smoker Vitals History BMI and BSA Data Body Mass Index Body Surface Area 37.75 kg/m 2 1.97 m 2 Preferred Pharmacy Pharmacy Name Phone [facility] [address], [facility] [address] [phone] Your Updated Medication List  
  
   
This list is accurate as of: 5/16/17  4:20 PM.  Always use your most recent med list.  
  
  
  
  
 atorvastatin 40 mg tablet Commonly known as:  LIPITOR Take 1 Tab by mouth daily. cyanocobalamin 1,000 mcg/mL injection Commonly known as:  VITAMIN B12  
1,000 mcg by IntraMUSCular route every thirty (30) days. ibuprofen 800 mg tablet Commonly known as:  MOTRIN Take 1 Tab by mouth two (2) times daily as needed for Pain. insulin glargine 100 unit/mL (3 mL) pen Commonly known as:  LANTUS SOLOSTAR  
70 units twice daily  
  
 insulin lispro 100 unit/mL kwikpen Commonly known as:  HUMALOG Sliding Scale for Blood Sugar above 300  
  
 lisinopril 20 mg tablet Commonly known as:  Catherine Gaster Take 1 Tab by mouth daily. Swapna Pen Needle 32 gauge x 5/32\" Ndle Generic drug:  Insulin Needles (Disposable) ONETOUCH ULTRA TEST strip Generic drug:  glucose blood VI test strips Use to check BS 3-4 times daily  
  
 pantoprazole 40 mg tablet Commonly known as:  PROTONIX Take 1 Tab by mouth daily. Prescriptions Sent to Pharmacy Refills  
 ibuprofen (MOTRIN) 800 mg tablet 2 Sig: Take 1 Tab by mouth two (2) times daily as needed for Pain.   
 Class: Normal  
 Pharmacy: [facility] [address] Ph #: [phone] Route: Oral  
 pantoprazole (PROTONIX) 40 mg tablet 6 Sig: Take 1 Tab by mouth daily. Class: Normal  
 Pharmacy: [facility] [address] Ph #: [phone] Route: Oral  
  
We Performed the Following AMB POC GLUCOSE, QUANTITATIVE, BLOOD [77588 CPT(R)] AMB POC HEMOGLOBIN A1C [90749 CPT(R)] AMB POC URINALYSIS DIP STICK AUTO W/ MICRO [79463 CPT(R)] CBC W/O DIFF [75222 CPT(R)] INTRINSIC FACTOR AB [id number] CPT(R)] LIPID PANEL [92543 CPT(R)] METABOLIC PANEL, COMPREHENSIVE [18260 CPT(R)] MICROALBUMIN, UR, RAND W/ MICROALBUMIN/CREA RATIO [id number] CPT(R)] PNEUMOCOCCAL POLYSACCHARIDE VACCINE, 23-VALENT, ADULT OR IMMUNOSUPPRESSED PT DOSE, [38191 CPT(R)] IN IMMUNIZ ADMIN,1 SINGLE/COMB VAC/TOXOID [id number] CPT(R)] TSH 3RD GENERATION [97356 CPT(R)] VITAMIN B12 [id number] CPT(R)] Follow-up Instructions Return in about 3 months (around 8/16/2017). Patient Instructions Gastroesophageal Reflux Disease (GERD): Care Instructions Your Care Instructions Gastroesophageal reflux disease (GERD) is the backward flow of stomach acid into the esophagus. The esophagus is the tube that leads from your throat to your stomach. A one-way valve prevents the stomach acid from moving up into this tube. When you have GERD, this valve does not close tightly enough. If you have mild GERD symptoms including heartburn, you may be able to control the problem with antacids or over-the-counter medicine. Changing your diet, losing weight, and making other lifestyle changes can also help reduce symptoms. Follow-up care is a key part of your treatment and safety.  Be sure to make and go to all appointments, and call your doctor if you are having problems. Its also a good idea to know your test results and keep a list of the medicines you take. How can you care for yourself at home? · Take your medicines exactly as prescribed. Call your doctor if you think you are having a problem with your medicine. · Your doctor may recommend over-the-counter medicine. For mild or occasional indigestion, antacids, such as Tums, Gaviscon, Mylanta, or Maalox, may help. Your doctor also may recommend over-the-counter acid reducers, such as Pepcid AC, Tagamet HB, Zantac 75, or Prilosec. Read and follow all instructions on the label. If you use these medicines often, talk with your doctor. · Change your eating habits. ¨ Its best to eat several small meals instead of two or three large meals. ¨ After you eat, wait 2 to 3 hours before you lie down. ¨ Chocolate, mint, and alcohol can make GERD worse. ¨ Spicy foods, foods that have a lot of acid (like tomatoes and oranges), and coffee can make GERD symptoms worse in some people. If your symptoms are worse after you eat a certain food, you may want to stop eating that food to see if your symptoms get better. · Do not smoke or chew tobacco. Smoking can make GERD worse. If you need help quitting, talk to your doctor about stop-smoking programs and medicines. These can increase your chances of quitting for good. · If you have GERD symptoms at night, raise the head of your bed 6 to 8 inches by putting the frame on blocks or placing a foam wedge under the head of your mattress. (Adding extra pillows does not work.) · Do not wear tight clothing around your middle. · Lose weight if you need to. Losing just 5 to 10 pounds can help. When should you call for help? Call your doctor now or seek immediate medical care if: 
· You have new or different belly pain. · Your stools are black and tarlike or have streaks of blood. Watch closely for changes in your health, and be sure to contact your doctor if: · Your symptoms have not improved after 2 days. · Food seems to catch in your throat or chest. 
Where can you learn more? Go to http://lópez-shiraz.info/. Enter A195 in the search box to learn more about \"Gastroesophageal Reflux Disease (GERD): Care Instructions. \" Current as of: August 9, 2016 Content Version: 11.2 © 3331-7445 [employer]. Care instructions adapted under license by [employer] (which disclaims liability or warranty for this information). If you have questions about a medical condition or this instruction, always ask your healthcare professional. [address] any warranty or liability for your use of this information. Introducing [facility]! Wilfred Vasquez introduces Opt[employer] patient portal. Now you can access parts of your medical record, email your doctor's office, and request medication refills online. 1. In your internet browser, go to [email or web address] 2. Click on the First Time User? Click Here link in the Sign In box. You will see the New Member Sign Up page. 3. Enter your Optisense Access Code exactly as it appears below. You will not need to use this code after youve completed the sign-up process. If you do not sign up before the expiration date, you must request a new code. · Opt[employer] Access Code: [email or web address] Expires: 8/14/2017  2:57 PM 
 
4. Enter the last four digits of your Social Security Number (xxxx) and Date of Birth (mm/dd/yyyy) as indicated and click Submit. You will be taken to the next sign-up page. 5. Create a Digital H2Ot ID. This will be your Optisense login ID and cannot be changed, so think of one that is secure and easy to remember. 6. Create a Optisense password. You can change your password at any time. 7. Enter your Password Reset Question and Answer. This can be used at a later time if you forget your password. 8. Enter your e-mail address. You will receive e-mail notification when new information is available in [address] 9. Click Sign Up. You can now view and download portions of your medical record. 10. Click the Download Summary menu link to download a portable copy of your medical information. If you have questions, please visit the Frequently Asked Questions section of the [employer] website. Remember, [employer] is NOT to be used for urgent needs. For medical emergencies, dial 911. Now available from your iPhone and Android! Please provide this summary of care documentation to your next provider. Your primary care clinician is listed as Antonia Reed. If you have any questions after today's visit, please call [phone].

## 2017-05-16 NOTE — PATIENT INSTRUCTIONS

## 2017-05-16 NOTE — PROGRESS NOTES
Chief Complaint   Patient presents with    [facility] Establish Care     Pt here to get established with Dr. Radha Weber

## 2017-05-16 NOTE — PROGRESS NOTES
HISTORY OF PRESENT ILLNESS  Huang Baires is a 46 y.o. female. HPI   Encounter to establish care. Hx reviewed form the chart and with the pt. .  Cardiovascular Review:  The patient has hypertension and hyperlipidemia. Diet and Lifestyle: generally follows a low fat low cholesterol diet, generally follows a low sodium diet, exercises sporadically, nonsmoker  Home BP Monitoring: is not measured at home. Pertinent ROS: taking medications as instructed, no medication side effects noted, no TIA's, no chest pain on exertion, no dyspnea on exertion, no swelling of ankles, no palpitations, no muscle aches or pain. Diabetes Mellitus:  She has diabetes mellitus type 1 dx at the age of 25. She has been on insulin since her diagnoses. She is followed by endo. She admits that her glucose control has not been optimal.  She thinks that her last a1c level was ~11%. She is due to see endo at the end of this month. She has retinopathy and recent has eye surgery for this. Her vision is improved since her surgery in March. Diabetic ROS - medication compliance: compliant most of the time, diabetic diet compliance: compliant most of the time, home glucose monitoring: is performed regularly, check BS 3 to 4 times in a day for SS humalog insulin, further diabetic ROS: no chest pain, dyspnea or TIA's, no numbness, tingling or pain in extremities. Lab review: orders written for new lab studies as appropriate; see orders. 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  60 Tab 2    pantoprazole (PROTONIX) 40 mg tablet Take 1 Tab by mouth daily. 30 Tab 6    ONETOUCH ULTRA TEST strip Use to check BS 3-4 times daily      PER PEN NEEDLE 32 gauge x 32\" ndle       cyanocobalamin (VITAMIN B12) 1,000 mcg/mL injection 1,000 mcg by IntraMUSCular route every thirty (30) days.  atorvastatin (LIPITOR) 40 mg tablet Take 1 Tab by mouth daily. 90 Tab 1    lisinopril (PRINIVIL, ZESTRIL) 20 mg tablet Take 1 Tab by mouth daily.  90 Tab 1    insulin glargine (LANTUS SOLOSTAR) 100 unit/mL (3 mL) pen 70 units twice daily 50 Each 3    insulin lispro (HUMALOG) 100 unit/mL kwikpen Sliding Scale for Blood Sugar above 300 1 Package 3       Allergies   Allergen Reactions    Percocet [Oxycodone-Acetaminophen] Other (comments)     AMS       Past Medical History:   Diagnosis Date    Diabetes ([address]     Diabetic retinopathy ([address]     right eye     Gestational diabetes     Hypercholesterolemia     Hypertension     Type 1 diabetes ([address]        Past Surgical History:   Procedure Laterality Date    HX BREAST REDUCTION      HX  SECTION  1990    HX  SECTION  2000    HX COLONOSCOPY      HX HEENT  2016    right eye surgery- repaired blood vessel    HX HEENT  2017    right eye surgery- removal of scar tissue    HX HYSTERECTOMY         Family History   Problem Relation Age of Onset    Hypertension Mother     Diabetes Father     No Known Problems Brother     No Known Problems Brother 25     suicide    Asthma Daughter     Asthma Son     Psoriasis Son        Social History   Substance Use Topics    Smoking status: Never Smoker    Smokeless tobacco: Never Used    Alcohol use No        Lab Results  Component Value Date/Time   WBC 8.2 2016 05:20 PM   HGB 13.4 2016 05:20 PM   HCT 41.1 2016 05:20 PM   PLATELET 712  05:20 PM   MCV 80 2016 05:20 PM       Lab Results  Component Value Date/Time   Hemoglobin A1c 11.5 2010 08:25 AM   Hemoglobin A1c 11.0 2009 03:40 PM   Glucose 283 05/26/2016 05:20 PM   Glucose  03/01/2017 09:50 AM   Microalb/Creat ratio (ug/mg creat.) 26.5 05/26/2016 05:20 PM   LDL, calculated 106.4 02/25/2010 08:25 AM   Creatinine 0.89 05/26/2016 05:20 PM      Lab Results  Component Value Date/Time   Cholesterol, total 173 02/25/2010 08:25 AM   HDL Cholesterol 56 02/25/2010 08:25 AM   LDL, calculated 106.4 02/25/2010 08:25 AM   Triglyceride 53 02/25/2010 08:25 AM   CHOL/HDL Ratio 3.1 02/25/2010 08:25 AM       Lab Results  Component Value Date/Time   TSH 0.818 05/26/2016 05:20 PM      Lab Results   Component Value Date/Time    Sodium 141 05/26/2016 05:20 PM    Potassium 4.7 05/26/2016 05:20 PM    Chloride 100 05/26/2016 05:20 PM    CO2 27 05/26/2016 05:20 PM    Anion gap 8 02/25/2010 08:25 AM    Glucose 283 05/26/2016 05:20 PM    BUN 16 05/26/2016 05:20 PM    Creatinine 0.89 05/26/2016 05:20 PM    BUN/Creatinine ratio 18 05/26/2016 05:20 PM    GFR est AA 87 05/26/2016 05:20 PM    GFR est non-AA 76 05/26/2016 05:20 PM    Calcium 9.6 05/26/2016 05:20 PM    Bilirubin, total <0.2 05/26/2016 05:20 PM    ALT (SGPT) 14 05/26/2016 05:20 PM    AST (SGOT) 14 05/26/2016 05:20 PM    Alk. phosphatase 110 05/26/2016 05:20 PM    Protein, total 6.8 05/26/2016 05:20 PM    Albumin 4.1 05/26/2016 05:20 PM    Globulin 3.3 02/25/2010 08:25 AM    A-G Ratio 1.5 05/26/2016 05:20 PM      Lab Results   Component Value Date/Time    Hemoglobin A1c 11.5 02/25/2010 08:25 AM    Hemoglobin A1c (POC) 11.6 03/01/2017 09:50 AM         Review of Systems   Constitutional: Negative for malaise/fatigue. HENT: Negative for congestion. Eyes: Negative for blurred vision. Respiratory: Negative for cough and shortness of breath. Cardiovascular: Negative for chest pain, palpitations and leg swelling. Gastrointestinal: Positive for heartburn. Negative for abdominal pain and constipation. Genitourinary: Negative for dysuria, frequency and urgency.    Musculoskeletal: Negative for back pain and joint pain. Neurological: Negative for dizziness, tingling and headaches. Endo/Heme/Allergies: Negative for environmental allergies. Psychiatric/Behavioral: Negative for depression. The patient does not have insomnia. Physical Exam   Constitutional: She appears well-developed and well-nourished. /80 (BP 1 Location: Left arm, BP Patient Position: Sitting)  Pulse 86  Temp 98.1 °F (36.7 °C) (Oral)   Resp 18  Ht 5' 1\" (1.549 m)  Wt 199 lb 12.8 oz (90.6 kg)  LMP  (LMP Unknown)  SpO2 99%  BMI 37.75 kg/m2     HENT:   Right Ear: Tympanic membrane and ear canal normal.   Left Ear: Tympanic membrane and ear canal normal.   Nose: No mucosal edema or rhinorrhea. Mouth/Throat: Oropharynx is clear and moist and mucous membranes are normal.   Neck: Normal range of motion. Neck supple. No thyromegaly present. Cardiovascular: Normal rate and regular rhythm. No murmur heard. Pulmonary/Chest: Effort normal and breath sounds normal.   Abdominal: Soft. Bowel sounds are normal. There is no tenderness. Musculoskeletal: Normal range of motion. She exhibits no edema. Lymphadenopathy:     She has no cervical adenopathy. Skin: Skin is warm and dry. Psychiatric: She has a normal mood and affect. Nursing note and vitals reviewed. ASSESSMENT and PLAN  Sara Garcia was seen today for establish care. 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     -     AMB POC HEMOGLOBIN A1C  -     AMB POC GLUCOSE, QUANTITATIVE, BLOOD  -     TSH 3RD GENERATION  -     MICROALBUMIN, UR, RAND W/ MICROALBUMIN/CREA RATIO  -     AMB POC URINALYSIS DIP STICK AUTO W/ MICRO    Essential hypertension  Discussed sodium restriction, high k rich diet, maintaining ideal body weight and regular exercise program such as daily walking 30 min perday 4-5 times per week, as physiologic means to achieve blood pressure control.  Medication compliance advised. Mixed hyperlipidemia  -     LIPID PANEL    Gastroesophageal reflux disease without esophagitis  -     pantoprazole (PROTONIX) 40 mg tablet; Take 1 Tab by mouth daily. Encounter for medication monitoring//  Encounter for long-term (current) use of insulin ([facility]  -     CBC W/O DIFF  -     METABOLIC PANEL, COMPREHENSIVE    B12 deficiency  -     VITAMIN B12  -     INTRINSIC FACTOR AB    Encounter for immunization  -     Pneumococcal polysaccharide vaccine, 23-valent, adult or immunosuppressed pt dose  -     AL IMMUNIZ ADMIN,1 SINGLE/COMB VAC/TOXOID    Follow-up Disposition:  Return in about 3 months (around 8/16/2017). reviewed diet, exercise and weight control  cardiovascular risk and specific lipid/LDL goals reviewed  reviewed medications and side effects in detail  specific diabetic recommendations: low cholesterol diet, weight control and daily exercise discussed, home glucose monitoring emphasized, all medications, side effects and compliance discussed carefully, foot care discussed and Podiatry visits discussed, annual eye examinations at Ophthalmology discussed and glycohemoglobin and other lab monitoring discussed     I have discussed diagnosis listed in this note with pt and/or family. I have discussed treatment plans and options and the risk/benefit analysis of those options, including safe use of medications and possible medication side effects. Through the use of shared decision making we have agreed to the above plan. The patient has received an after-visit summary and questions were answered concerning future plans and follow up. Advise pt of any urgent changes then to proceed to the ER.

## 2017-05-17 LAB
ALBUMIN SERPL-MCNC: 4.1 G/DL (ref 3.5–5.5)
ALBUMIN/CREAT UR: 10.7 MG/G CREAT (ref 0–30)
ALBUMIN/GLOB SERPL: 1.6 {RATIO} (ref 1.2–2.2)
ALP SERPL-CCNC: 124 IU/L (ref 39–117)
ALT SERPL-CCNC: 32 IU/L (ref 0–32)
AST SERPL-CCNC: 22 IU/L (ref 0–40)
BILIRUB SERPL-MCNC: <0.2 MG/DL (ref 0–1.2)
BUN SERPL-MCNC: 12 MG/DL (ref 6–24)
BUN/CREAT SERPL: 10 (ref 9–23)
CALCIUM SERPL-MCNC: 9.4 MG/DL (ref 8.7–10.2)
CHLORIDE SERPL-SCNC: 98 MMOL/L (ref 96–106)
CHOLEST SERPL-MCNC: 227 MG/DL (ref 100–199)
CO2 SERPL-SCNC: 23 MMOL/L (ref 18–29)
CREAT SERPL-MCNC: 1.18 MG/DL (ref 0.57–1)
CREAT UR-MCNC: 57 MG/DL
ERYTHROCYTE [DISTWIDTH] IN BLOOD BY AUTOMATED COUNT: 13.7 % (ref 12.3–15.4)
GLOBULIN SER CALC-MCNC: 2.5 G/DL (ref 1.5–4.5)
GLUCOSE SERPL-MCNC: 459 MG/DL (ref 65–99)
HCT VFR BLD AUTO: 41.5 % (ref 34–46.6)
HDLC SERPL-MCNC: 64 MG/DL
HGB BLD-MCNC: 12.6 G/DL (ref 11.1–15.9)
IF BLOCK AB SER-ACNC: 1 AU/ML (ref 0–1.1)
INTERPRETATION, 910389: NORMAL
INTERPRETATION: NORMAL
LDLC SERPL CALC-MCNC: 134 MG/DL (ref 0–99)
MCH RBC QN AUTO: 25.9 PG (ref 26.6–33)
MCHC RBC AUTO-ENTMCNC: 30.4 G/DL (ref 31.5–35.7)
MCV RBC AUTO: 85 FL (ref 79–97)
MICROALBUMIN UR-MCNC: 6.1 UG/ML
PDF IMAGE, 910387: NORMAL
PLATELET # BLD AUTO: 267 X10E3/UL (ref 150–379)
POTASSIUM SERPL-SCNC: 5.5 MMOL/L (ref 3.5–5.2)
PROT SERPL-MCNC: 6.6 G/DL (ref 6–8.5)
RBC # BLD AUTO: 4.86 X10E6/UL (ref 3.77–5.28)
SODIUM SERPL-SCNC: 138 MMOL/L (ref 134–144)
TRIGL SERPL-MCNC: 146 MG/DL (ref 0–149)
TSH SERPL DL<=0.005 MIU/L-ACNC: 0.94 UIU/ML (ref 0.45–4.5)
VIT B12 SERPL-MCNC: 818 PG/ML (ref 211–946)
VLDLC SERPL CALC-MCNC: 29 MG/DL (ref 5–40)
WBC # BLD AUTO: 6.1 X10E3/UL (ref 3.4–10.8)

## 2017-05-18 NOTE — PROGRESS NOTES
Please call pt and let her know that K is high. Repeat in one week. Reduce K rich foods in the diet.

## 2017-05-24 ENCOUNTER — TELEPHONE (OUTPATIENT)
Dept: FAMILY MEDICINE CLINIC | Age: 52
End: 2017-05-24

## 2017-05-24 NOTE — TELEPHONE ENCOUNTER
Spoke with pt and informed that K+ was high per Dr. Chivo Perez will need to repeat. Appt made for 6/2/2017 at 7:45am for lab only. Pt accepted appt.

## 2017-06-01 ENCOUNTER — HOSPITAL ENCOUNTER (OUTPATIENT)
Dept: MAMMOGRAPHY | Age: 52
Discharge: HOME | End: 2017-06-01
Attending: FAMILY MEDICINE
Payer: COMMERCIAL

## 2017-06-01 DIAGNOSIS — Z12.31 ENCOUNTER FOR SCREENING MAMMOGRAM FOR BREAST CANCER: ICD-10-CM

## 2017-06-01 PROCEDURE — 77067 SCR MAMMO BI INCL CAD: CPT

## 2017-06-02 ENCOUNTER — LAB ONLY (OUTPATIENT)
Dept: FAMILY MEDICINE CLINIC | Age: 52
End: 2017-06-02

## 2017-06-02 DIAGNOSIS — E87.6 HYPOKALEMIA: Primary | ICD-10-CM

## 2017-06-02 DIAGNOSIS — E10.65 HYPERGLYCEMIA DUE TO TYPE 1 DIABETES MELLITUS (HCC): ICD-10-CM

## 2017-06-02 LAB — GLUCOSE POC: 65 MG/DL

## 2017-06-07 ENCOUNTER — TELEPHONE (OUTPATIENT)
Dept: FAMILY MEDICINE CLINIC | Age: 52
End: 2017-06-07

## 2017-06-07 NOTE — TELEPHONE ENCOUNTER
Called patient and informed that her repeat potassium was normal per Dr. Sherrell Kumar. Patient verbalized understanding.

## 2017-06-09 ENCOUNTER — OFFICE VISIT (OUTPATIENT)
Dept: FAMILY MEDICINE CLINIC | Age: 52
End: 2017-06-09

## 2017-06-09 VITALS
HEIGHT: 61 IN | OXYGEN SATURATION: 99 % | BODY MASS INDEX: 36.25 KG/M2 | RESPIRATION RATE: 16 BRPM | HEART RATE: 91 BPM | SYSTOLIC BLOOD PRESSURE: 137 MMHG | TEMPERATURE: 98.6 F | DIASTOLIC BLOOD PRESSURE: 83 MMHG | WEIGHT: 192 LBS

## 2017-06-09 DIAGNOSIS — H26.9 CATARACT OF RIGHT EYE, UNSPECIFIED CATARACT TYPE: ICD-10-CM

## 2017-06-09 DIAGNOSIS — Z79.4 ENCOUNTER FOR LONG-TERM (CURRENT) INSULIN USE (HCC): ICD-10-CM

## 2017-06-09 DIAGNOSIS — K21.9 GASTROESOPHAGEAL REFLUX DISEASE WITHOUT ESOPHAGITIS: ICD-10-CM

## 2017-06-09 DIAGNOSIS — Z51.81 ENCOUNTER FOR MEDICATION MONITORING: ICD-10-CM

## 2017-06-09 DIAGNOSIS — E10.319 TYPE 1 DIABETES MELLITUS WITH RETINOPATHY OF RIGHT EYE WITHOUT MACULAR EDEMA, UNSPECIFIED RETINOPATHY SEVERITY (HCC): ICD-10-CM

## 2017-06-09 DIAGNOSIS — Z01.818 PRE-OP EXAMINATION: Primary | ICD-10-CM

## 2017-06-09 NOTE — PROGRESS NOTES
Patient having cataract surgery on 6/29 w/ Dr Ai Harris.     CMP 5/16/17    Lipid 5/16/17    A1C 5/16/17    Microalbumin 5/16/17

## 2017-06-09 NOTE — PROGRESS NOTES
HISTORY OF PRESENT ILLNESS  Jose Medellin is a 46 y.o. female. HPI   Pre-op for cataract surgery. Cardiovascular Review:  The patient has hypertension and hyperlipidemia. Diet and Lifestyle: generally follows a low fat low cholesterol diet, generally follows a low sodium diet, exercises sporadically, nonsmoker  Home BP Monitoring: is not measured at home. Pertinent ROS: taking medications as instructed, no medication side effects noted, no TIA's, no chest pain on exertion, no dyspnea on exertion, no swelling of ankles, no palpitations, no muscle aches or pain. Diabetes Mellitus:  She has diabetes mellitus type 1 dx at the age of 25. She has been on insulin since her diagnoses. She is followed by endo. She admits that her glucose control has not been optimal.  She has retinopathy. Her vision is improved since her surgery in March. Diabetic ROS - medication compliance: compliant most of the time, diabetic diet compliance: compliant most of the time, home glucose monitoring: is performed regularly, check BS 3 to 4 times in a day for SS humalog insulin, further diabetic ROS: no chest pain, dyspnea or TIA's, no numbness, tingling or pain in extremities. Lab review: orders written for new lab studies as appropriate; see orders. Patient Active Problem List   Diagnosis Code    Gastroesophageal reflux disease without esophagitis K21.9    History of hysterectomy for benign disease Z90.710    Diabetic retinopathy ([address] E11.319    Uncontrolled type 1 diabetes mellitus with retinopathy of right eye ([address] E10.319, E10.65       Current Outpatient Prescriptions   Medication Sig Dispense Refill    ibuprofen (MOTRIN) 800 mg tablet Take 1 Tab by mouth two (2) times daily as needed for Pain. 60 Tab 2    pantoprazole (PROTONIX) 40 mg tablet Take 1 Tab by mouth daily.  30 Tab 6    ONETOUCH ULTRA TEST strip Use to check BS 3-4 times daily      PER PEN NEEDLE 32 gauge x 5/32\" ndle       cyanocobalamin (VITAMIN B12) 1,000 mcg/mL injection 1,000 mcg by IntraMUSCular route every thirty (30) days.  atorvastatin (LIPITOR) 40 mg tablet Take 1 Tab by mouth daily. 90 Tab 1    lisinopril (PRINIVIL, ZESTRIL) 20 mg tablet Take 1 Tab by mouth daily.  90 Tab 1    insulin glargine (LANTUS SOLOSTAR) 100 unit/mL (3 mL) pen 70 units twice daily 50 Each 3    insulin lispro (HUMALOG) 100 unit/mL kwikpen Sliding Scale for Blood Sugar above 300 1 Package 3       Allergies   Allergen Reactions    Percocet [Oxycodone-Acetaminophen] Other (comments)     AMS       Past Medical History:   Diagnosis Date    Diabetes ([address]     Diabetic retinopathy ([facility] [address]     right eye     Gestational diabetes     Hypercholesterolemia     Hypertension     Type 1 diabetes ([facility] [address]        Past Surgical History:   Procedure Laterality Date    HX BREAST REDUCTION Bilateral     HX  SECTION  1990    HX  SECTION  2000    HX COLONOSCOPY      HX HEENT  2016    right eye surgery- repaired blood vessel    HX HEENT  2017    right eye surgery- removal of scar tissue    HX HYSTERECTOMY         Family History   Problem Relation Age of Onset    Hypertension Mother     Diabetes Father     No Known Problems Brother     Asthma Daughter     Asthma Son     Psoriasis Son        Social History   Substance Use Topics    Smoking status: Never Smoker    Smokeless tobacco: Never Used    Alcohol use No        Lab Results  Component Value Date/Time   WBC 6.1 2017 03:47 PM   HGB 12.6 2017 03:47 PM   HCT 41.5 2017 03:47 PM   PLATELET 483  03:47 PM   MCV 85 2017 03:47 PM       Lab Results  Component Value Date/Time   Cholesterol, total 227 2017 03:47 PM   HDL Cholesterol 64 2017 03:47 PM   LDL, calculated 134 2017 03:47 PM   Triglyceride 146 2017 03:47 PM   CHOL/HDL Ratio 3.1 2010 08:25 AM       Lab Results  Component Value Date/Time   TSH 0.942 2017 03:47 PM Lab Results   Component Value Date/Time    Sodium 138 05/16/2017 03:47 PM    Potassium 4.2 06/02/2017 08:06 AM    Chloride 98 05/16/2017 03:47 PM    CO2 23 05/16/2017 03:47 PM    Anion gap 8 02/25/2010 08:25 AM    Glucose 459 05/16/2017 03:47 PM    BUN 12 05/16/2017 03:47 PM    Creatinine 1.18 05/16/2017 03:47 PM    BUN/Creatinine ratio 10 05/16/2017 03:47 PM    GFR est AA 62 05/16/2017 03:47 PM    GFR est non-AA 54 05/16/2017 03:47 PM    Calcium 9.4 05/16/2017 03:47 PM    Bilirubin, total <0.2 05/16/2017 03:47 PM    ALT (SGPT) 32 05/16/2017 03:47 PM    AST (SGOT) 22 05/16/2017 03:47 PM    Alk. phosphatase 124 05/16/2017 03:47 PM    Protein, total 6.6 05/16/2017 03:47 PM    Albumin 4.1 05/16/2017 03:47 PM    Globulin 3.3 02/25/2010 08:25 AM    A-G Ratio 1.6 05/16/2017 03:47 PM      Lab Results   Component Value Date/Time          Hemoglobin A1c (POC) 8.3 05/16/2017 03:47 PM         Review of Systems   Constitutional: Negative for malaise/fatigue. HENT: Negative for congestion. Eyes: Negative for blurred vision. Respiratory: Negative for cough and shortness of breath. Cardiovascular: Negative for chest pain, palpitations and leg swelling. Gastrointestinal: Negative for abdominal pain, constipation and heartburn. Genitourinary: Negative for dysuria, frequency and urgency. Musculoskeletal: Negative for back pain and joint pain. Neurological: Negative for dizziness, tingling and headaches. Endo/Heme/Allergies: Negative for environmental allergies. Psychiatric/Behavioral: Negative for depression. The patient does not have insomnia. Physical Exam   Constitutional: She appears well-developed and well-nourished.    /83 (BP 1 Location: Right arm, BP Patient Position: Sitting)  Pulse 91  Temp 98.6 °F (37 °C) (Oral)   Resp 16  Ht 5' 1\" (1.549 m)  Wt 192 lb (87.1 kg)  LMP 02/01/2001  SpO2 99%  BMI 36.28 kg/m2     HENT:   Right Ear: Tympanic membrane and ear canal normal.   Left Ear: Tympanic membrane and ear canal normal.   Nose: No mucosal edema or rhinorrhea. Mouth/Throat: Oropharynx is clear and moist and mucous membranes are normal.   Neck: Normal range of motion. Neck supple. No thyromegaly present. Cardiovascular: Normal rate and regular rhythm. No murmur heard. Pulmonary/Chest: Effort normal and breath sounds normal.   Abdominal: Soft. Bowel sounds are normal. There is no tenderness. Musculoskeletal: Normal range of motion. She exhibits no edema. Lymphadenopathy:     She has no cervical adenopathy. Skin: Skin is warm and dry. Psychiatric: She has a normal mood and affect. Nursing note and vitals reviewed. ASSESSMENT and PLAN  Alec Em was seen today for pre-op exam.    Diagnoses and all orders for this visit:    Pre-op examination//  Cataract of right eye, unspecified cataract type       Medically stable for surgery. Type 1 diabetes mellitus with retinopathy of right eye without macular edema, unspecified retinopathy severity    Gastroesophageal reflux disease without esophagitis  Stable with protonix    Encounter for medication monitoring//  Encounter for long-term (current) insulin use ([facility]      Follow-up Disposition:  Return in about 4 months (around 10/9/2017) for physical.  reviewed diet, exercise and weight control  cardiovascular risk and specific lipid/LDL goals reviewed  reviewed medications and side effects in detail  specific diabetic recommendations: low cholesterol diet, weight control and daily exercise discussed and annual eye examinations at Ophthalmology discussed     I have discussed diagnosis listed in this note with pt and/or family. I have discussed treatment plans and options and the risk/benefit analysis of those options, including safe use of medications and possible medication side effects. Through the use of shared decision making we have agreed to the above plan.  The patient has received an after-visit summary and questions were answered concerning future plans and follow up. Advise pt of any urgent changes then to proceed to the ER.

## 2017-07-13 LAB — POTASSIUM SERPL-SCNC: 4.2 MMOL/L (ref 3.5–5.2)

## 2017-08-21 ENCOUNTER — OFFICE VISIT (OUTPATIENT)
Dept: FAMILY MEDICINE CLINIC | Age: 52
End: 2017-08-21

## 2017-08-21 VITALS
SYSTOLIC BLOOD PRESSURE: 138 MMHG | BODY MASS INDEX: 36.29 KG/M2 | OXYGEN SATURATION: 96 % | WEIGHT: 192.2 LBS | RESPIRATION RATE: 16 BRPM | HEART RATE: 70 BPM | TEMPERATURE: 97.3 F | DIASTOLIC BLOOD PRESSURE: 77 MMHG | HEIGHT: 61 IN

## 2017-08-21 DIAGNOSIS — Z01.818 PRE-OP EXAMINATION: Primary | ICD-10-CM

## 2017-08-21 DIAGNOSIS — E10.319 TYPE 1 DIABETES MELLITUS WITH RETINOPATHY OF RIGHT EYE WITHOUT MACULAR EDEMA, UNSPECIFIED RETINOPATHY SEVERITY (HCC): ICD-10-CM

## 2017-08-21 DIAGNOSIS — Z79.4 ENCOUNTER FOR LONG-TERM (CURRENT) USE OF INSULIN (HCC): ICD-10-CM

## 2017-08-21 DIAGNOSIS — H26.8 OTHER CATARACT OF LEFT EYE: ICD-10-CM

## 2017-08-21 DIAGNOSIS — I10 ESSENTIAL HYPERTENSION: ICD-10-CM

## 2017-08-21 DIAGNOSIS — Z51.81 ENCOUNTER FOR MEDICATION MONITORING: ICD-10-CM

## 2017-08-21 DIAGNOSIS — K21.9 GASTROESOPHAGEAL REFLUX DISEASE WITHOUT ESOPHAGITIS: ICD-10-CM

## 2017-08-21 DIAGNOSIS — E78.2 MIXED HYPERLIPIDEMIA: ICD-10-CM

## 2017-08-21 RX ORDER — OFLOXACIN 3 MG/ML
SOLUTION/ DROPS OPHTHALMIC
Refills: 0 | COMMUNITY
Start: 2017-06-19 | End: 2017-08-21

## 2017-08-21 RX ORDER — PEN NEEDLE, DIABETIC 31 GX5/16"
NEEDLE, DISPOSABLE MISCELLANEOUS
Refills: 3 | COMMUNITY
Start: 2017-08-09 | End: 2017-08-21 | Stop reason: SDUPTHER

## 2017-08-21 RX ORDER — PREDNISOLONE ACETATE 10 MG/ML
SUSPENSION/ DROPS OPHTHALMIC
Refills: 0 | COMMUNITY
Start: 2017-07-24 | End: 2017-08-21

## 2017-08-21 RX ORDER — DICLOFENAC SODIUM 1 MG/ML
SOLUTION/ DROPS OPHTHALMIC
Refills: 0 | COMMUNITY
Start: 2017-06-30 | End: 2017-08-21

## 2017-08-21 NOTE — PROGRESS NOTES
HISTORY OF PRESENT ILLNESS  Sada Blood is a 46 y.o. female. HPI   Pre-op for cataract surgery. Going for left eye. Cardiovascular Review:  The patient has hypertension and hyperlipidemia. Diet and Lifestyle: generally follows a low fat low cholesterol diet, generally follows a low sodium diet, exercises sporadically, nonsmoker  Home BP Monitoring: is not measured at home. Pertinent ROS: taking medications as instructed, no medication side effects noted, no TIA's, no chest pain on exertion, no dyspnea on exertion, no swelling of ankles, no palpitations, no muscle aches or pain. Diabetes Mellitus: Followed by endo and seen on last week. Pt reports that her a1c level was back up to 10%. Her SS was adjusted per endo. She has diabetes mellitus type 1 dx at the age of 25. She has been on insulin since her diagnoses. She admits that her glucose control has not been optimal.  She has retinopathy. Diabetic ROS - medication compliance: compliant most of the time, diabetic diet compliance: compliant most of the time, home glucose monitoring: is performed regularly, check BS 3 to 4 times in a day for SS humalog insulin, further diabetic ROS: no chest pain, dyspnea or TIA's, no numbness, tingling or pain in extremities. Lab review: orders written for new lab studies as appropriate; see orders. Patient Active Problem List   Diagnosis Code    Gastroesophageal reflux disease without esophagitis K21.9    History of hysterectomy for benign disease Z90.710    Diabetic retinopathy ([facility] [address] E11.319    Uncontrolled type 1 diabetes mellitus with retinopathy of right eye ([address] E10.319, E10.65    Type 1 diabetes mellitus with retinopathy of right eye without macular edema ([facility]) E10.319       Current Outpatient Prescriptions   Medication Sig Dispense Refill    ibuprofen (MOTRIN) 800 mg tablet Take 1 Tab by mouth two (2) times daily as needed for Pain.  60 Tab 2    pantoprazole (PROTONIX) 40 mg tablet Take 1 Tab by mouth daily. 30 Tab 6    ONETOUCH ULTRA TEST strip Use to check BS 3-4 times daily      PER PEN NEEDLE 32 gauge x 32\" ndle       cyanocobalamin (VITAMIN B12) 1,000 mcg/mL injection 1,000 mcg by IntraMUSCular route every thirty (30) days.  atorvastatin (LIPITOR) 40 mg tablet Take 1 Tab by mouth daily. 90 Tab 1    lisinopril (PRINIVIL, ZESTRIL) 20 mg tablet Take 1 Tab by mouth daily.  90 Tab 1    insulin glargine (LANTUS SOLOSTAR) 100 unit/mL (3 mL) pen 70 units twice daily 50 Each 3    insulin lispro (HUMALOG) 100 unit/mL kwikpen Sliding Scale for Blood Sugar above 300 1 Package 3       Allergies   Allergen Reactions    Percocet [Oxycodone-Acetaminophen] Other (comments)     AMS         Past Medical History:   Diagnosis Date    Diabetes ([address]     Diabetic retinopathy ([facility] [address]     right eye     Gestational diabetes     Hypercholesterolemia     Hypertension     Type 1 diabetes ([facility]          Past Surgical History:   Procedure Laterality Date    HX BREAST REDUCTION Bilateral     HX  SECTION  1990    HX  SECTION  2000    HX COLONOSCOPY      HX HEENT  2016    right eye surgery- repaired blood vessel    HX HEENT  2017    right eye surgery- removal of scar tissue    HX HYSTERECTOMY           Family History   Problem Relation Age of Onset    Hypertension Mother     Diabetes Father     No Known Problems Brother     Asthma Daughter     Asthma Son     Psoriasis Son        Social History   Substance Use Topics    Smoking status: Never Smoker    Smokeless tobacco: Never Used    Alcohol use No        Lab Results   Component Value Date/Time    WBC 6.1 2017 03:47 PM    HGB 12.6 2017 03:47 PM    HCT 41.5 2017 03:47 PM    PLATELET 325  03:47 PM    MCV 85 2017 03:47 PM       Lab Results   Component Value Date/Time    Cholesterol, total 227 2017 03:47 PM    HDL Cholesterol 64 2017 03:47 PM    LDL, calculated 134 05/16/2017 03:47 PM    Triglyceride 146 05/16/2017 03:47 PM    CHOL/HDL Ratio 3.1 02/25/2010 08:25 AM       Lab Results   Component Value Date/Time    TSH 0.942 05/16/2017 03:47 PM      Lab Results   Component Value Date/Time    Sodium 138 05/16/2017 03:47 PM    Potassium 4.2 06/02/2017 08:06 AM    Chloride 98 05/16/2017 03:47 PM    CO2 23 05/16/2017 03:47 PM    Anion gap 8 02/25/2010 08:25 AM    Glucose 459 05/16/2017 03:47 PM    BUN 12 05/16/2017 03:47 PM    Creatinine 1.18 05/16/2017 03:47 PM    BUN/Creatinine ratio 10 05/16/2017 03:47 PM    GFR est AA 62 05/16/2017 03:47 PM    GFR est non-AA 54 05/16/2017 03:47 PM    Calcium 9.4 05/16/2017 03:47 PM    Bilirubin, total <0.2 05/16/2017 03:47 PM    ALT (SGPT) 32 05/16/2017 03:47 PM    AST (SGOT) 22 05/16/2017 03:47 PM    Alk. phosphatase 124 05/16/2017 03:47 PM    Protein, total 6.6 05/16/2017 03:47 PM    Albumin 4.1 05/16/2017 03:47 PM    Globulin 3.3 02/25/2010 08:25 AM    A-G Ratio 1.6 05/16/2017 03:47 PM      Lab Results   Component Value Date/Time          Hemoglobin A1c (POC) 8.3 05/16/2017 03:47 PM         Review of Systems   Constitutional: Negative for malaise/fatigue. HENT: Negative for congestion. Respiratory: Negative for cough and shortness of breath. Cardiovascular: Negative for chest pain, palpitations and leg swelling. Gastrointestinal: Negative for abdominal pain, constipation and heartburn. Genitourinary: Negative for dysuria, frequency and urgency. Musculoskeletal: Negative for back pain and joint pain. Neurological: Negative for dizziness, tingling and headaches. Endo/Heme/Allergies: Negative for environmental allergies. Psychiatric/Behavioral: Negative for depression. The patient does not have insomnia. Physical Exam   Constitutional: She appears well-developed and well-nourished.    /77 (BP 1 Location: Left arm, BP Patient Position: Sitting)  Pulse 70  Temp 97.3 °F (36.3 °C) (Oral)   Resp 16  Ht 5' 1\" (1.549 m)  Wt 192 lb 3.2 oz (87.2 kg)  LMP 02/01/2001  SpO2 96%  BMI 36.32 kg/m2     HENT:   Right Ear: Tympanic membrane and ear canal normal.   Left Ear: Tympanic membrane and ear canal normal.   Nose: No mucosal edema or rhinorrhea. Mouth/Throat: Oropharynx is clear and moist and mucous membranes are normal.   Neck: Normal range of motion. Neck supple. No thyromegaly present. Cardiovascular: Normal rate and regular rhythm. No murmur heard. Pulmonary/Chest: Effort normal and breath sounds normal.   Abdominal: Soft. Bowel sounds are normal. There is no tenderness. Musculoskeletal: Normal range of motion. She exhibits no edema. Lymphadenopathy:     She has no cervical adenopathy. Skin: Skin is warm and dry. Psychiatric: She has a normal mood and affect. Nursing note and vitals reviewed. ASSESSMENT and PLAN  Diagnoses and all orders for this visit:    1. Pre-op examination//  2. Other cataract of left eye  Medically stable for surgery. 3. Essential hypertension  Discussed sodium restriction, high k rich diet, maintaining ideal body weight and regular exercise program such as daily walking 30 min perday 4-5 times per week, as physiologic means to achieve blood pressure control.  Medication compliance advised. 4. Type 1 diabetes mellitus with retinopathy of right eye without macular edema, unspecified retinopathy severity ([address]  As per endo. Will request her most recent labs. 5. Mixed hyperlipidemia  Continue to monitor. Work on diet and exercise. 6. Gastroesophageal reflux disease without esophagitis  Stable on protonix    7. Encounter for medication monitoring    8.  Encounter for long-term (current) use of insulin ([address]      Follow-up Disposition: Not on File  reviewed diet, exercise and weight control  cardiovascular risk and specific lipid/LDL goals reviewed  reviewed medications and side effects in detail  specific diabetic recommendations: low cholesterol diet, weight control and daily exercise discussed, all medications, side effects and compliance discussed carefully, foot care discussed and Podiatry visits discussed, annual eye examinations at Ophthalmology discussed and glycohemoglobin and other lab monitoring discussed     I have discussed diagnosis listed in this note with pt and/or family. I have discussed treatment plans and options and the risk/benefit analysis of those options, including safe use of medications and possible medication side effects. Through the use of shared decision making we have agreed to the above plan. The patient has received an after-visit summary and questions were answered concerning future plans and follow up. Advise pt of any urgent changes then to proceed to the ER.

## 2017-08-21 NOTE — PROGRESS NOTES
Chief Complaint   Patient presents with    Pre-op Exam     left cataract 9/7/2017 by Dr. Zac Shelby

## 2017-08-21 NOTE — PROGRESS NOTES
HISTORY OF PRESENT ILLNESS  Nicolás Alexandra is a 46 y.o. female. HPI   Pre-op for cataract surgery. Cardiovascular Review:  The patient has hypertension and hyperlipidemia. Diet and Lifestyle: generally follows a low fat low cholesterol diet, generally follows a low sodium diet, exercises sporadically, nonsmoker  Home BP Monitoring: is not measured at home. Pertinent ROS: taking medications as instructed, no medication side effects noted, no TIA's, no chest pain on exertion, no dyspnea on exertion, no swelling of ankles, no palpitations, no muscle aches or pain. Diabetes Mellitus:  She has diabetes mellitus type 1 dx at the age of 25. She has been on insulin since her diagnoses. She is followed by endo. She admits that her glucose control has not been optimal.  She has retinopathy. Her vision is improved since her surgery in March. Diabetic ROS - medication compliance: compliant most of the time, diabetic diet compliance: compliant most of the time, home glucose monitoring: is performed regularly, check BS 3 to 4 times in a day for SS humalog insulin, further diabetic ROS: no chest pain, dyspnea or TIA's, no numbness, tingling or pain in extremities. Lab review: orders written for new lab studies as appropriate; see orders. Patient Active Problem List   Diagnosis Code    Gastroesophageal reflux disease without esophagitis K21.9    History of hysterectomy for benign disease Z90.710    Diabetic retinopathy ([address] E11.319    Uncontrolled type 1 diabetes mellitus with retinopathy of right eye ([address] E10.319, E10.65    Type 1 diabetes mellitus with retinopathy of right eye without macular edema ([facility]) E10.319       Current Outpatient Prescriptions   Medication Sig Dispense Refill    ibuprofen (MOTRIN) 800 mg tablet Take 1 Tab by mouth two (2) times daily as needed for Pain. 60 Tab 2    pantoprazole (PROTONIX) 40 mg tablet Take 1 Tab by mouth daily.  30 Tab 6    ONETOUCH ULTRA TEST strip Use to check BS 3-4 times daily      PER PEN NEEDLE 32 gauge x \" ndle       cyanocobalamin (VITAMIN B12) 1,000 mcg/mL injection 1,000 mcg by IntraMUSCular route every thirty (30) days.  atorvastatin (LIPITOR) 40 mg tablet Take 1 Tab by mouth daily. 90 Tab 1    lisinopril (PRINIVIL, ZESTRIL) 20 mg tablet Take 1 Tab by mouth daily.  90 Tab 1    insulin glargine (LANTUS SOLOSTAR) 100 unit/mL (3 mL) pen 70 units twice daily 50 Each 3    insulin lispro (HUMALOG) 100 unit/mL kwikpen Sliding Scale for Blood Sugar above 300 1 Package 3       Allergies   Allergen Reactions    Percocet [Oxycodone-Acetaminophen] Other (comments)     AMS         Past Medical History:   Diagnosis Date    Diabetes ([address]     Diabetic retinopathy ([address]     right eye     Gestational diabetes     Hypercholesterolemia     Hypertension     Type 1 diabetes ([address]          Past Surgical History:   Procedure Laterality Date    HX BREAST REDUCTION Bilateral     HX  SECTION  1990    HX  SECTION  2000    HX COLONOSCOPY      HX HEENT  2016    right eye surgery- repaired blood vessel    HX HEENT  2017    right eye surgery- removal of scar tissue    HX HYSTERECTOMY           Family History   Problem Relation Age of Onset    Hypertension Mother     Diabetes Father     No Known Problems Brother     Asthma Daughter     Asthma Son     Psoriasis Son        Social History   Substance Use Topics    Smoking status: Never Smoker    Smokeless tobacco: Never Used    Alcohol use No        Lab Results   Component Value Date/Time    WBC 6.1 2017 03:47 PM    HGB 12.6 2017 03:47 PM    HCT 41.5 2017 03:47 PM    PLATELET 586  03:47 PM    MCV 85 2017 03:47 PM       Lab Results   Component Value Date/Time    Cholesterol, total 227 2017 03:47 PM    HDL Cholesterol 64 2017 03:47 PM    LDL, calculated 134 2017 03:47 PM    Triglyceride 146 2017 03:47 PM    CHOL/HDL Ratio 3.1 02/25/2010 08:25 AM       Lab Results   Component Value Date/Time    TSH 0.942 05/16/2017 03:47 PM      Lab Results   Component Value Date/Time    Sodium 138 05/16/2017 03:47 PM    Potassium 4.2 06/02/2017 08:06 AM    Chloride 98 05/16/2017 03:47 PM    CO2 23 05/16/2017 03:47 PM    Anion gap 8 02/25/2010 08:25 AM    Glucose 459 05/16/2017 03:47 PM    BUN 12 05/16/2017 03:47 PM    Creatinine 1.18 05/16/2017 03:47 PM    BUN/Creatinine ratio 10 05/16/2017 03:47 PM    GFR est AA 62 05/16/2017 03:47 PM    GFR est non-AA 54 05/16/2017 03:47 PM    Calcium 9.4 05/16/2017 03:47 PM    Bilirubin, total <0.2 05/16/2017 03:47 PM    ALT (SGPT) 32 05/16/2017 03:47 PM    AST (SGOT) 22 05/16/2017 03:47 PM    Alk. phosphatase 124 05/16/2017 03:47 PM    Protein, total 6.6 05/16/2017 03:47 PM    Albumin 4.1 05/16/2017 03:47 PM    Globulin 3.3 02/25/2010 08:25 AM    A-G Ratio 1.6 05/16/2017 03:47 PM      Lab Results   Component Value Date/Time          Hemoglobin A1c (POC) 8.3 05/16/2017 03:47 PM         Review of Systems   Constitutional: Negative for malaise/fatigue. HENT: Negative for congestion. Eyes: Negative for blurred vision. Respiratory: Negative for cough and shortness of breath. Cardiovascular: Negative for chest pain, palpitations and leg swelling. Gastrointestinal: Negative for abdominal pain, constipation and heartburn. Genitourinary: Negative for dysuria, frequency and urgency. Musculoskeletal: Negative for back pain and joint pain. Neurological: Negative for dizziness, tingling and headaches. Endo/Heme/Allergies: Negative for environmental allergies. Psychiatric/Behavioral: Negative for depression. The patient does not have insomnia. Physical Exam   Constitutional: She appears well-developed and well-nourished.    /83 (BP 1 Location: Right arm, BP Patient Position: Sitting)  Pulse 91  Temp 98.6 °F (37 °C) (Oral)   Resp 16  Ht 5' 1\" (1.549 m)  Wt 192 lb (87.1 kg)  LMP 02/01/2001  SpO2 99%  BMI 36.28 kg/m2     HENT:   Right Ear: Tympanic membrane and ear canal normal.   Left Ear: Tympanic membrane and ear canal normal.   Nose: No mucosal edema or rhinorrhea. Mouth/Throat: Oropharynx is clear and moist and mucous membranes are normal.   Neck: Normal range of motion. Neck supple. No thyromegaly present. Cardiovascular: Normal rate and regular rhythm. No murmur heard. Pulmonary/Chest: Effort normal and breath sounds normal.   Abdominal: Soft. Bowel sounds are normal. There is no tenderness. Musculoskeletal: Normal range of motion. She exhibits no edema. Lymphadenopathy:     She has no cervical adenopathy. Skin: Skin is warm and dry. Psychiatric: She has a normal mood and affect. Nursing note and vitals reviewed. ASSESSMENT and PLAN  Claudia Cameron was seen today for pre-op exam.    Diagnoses and all orders for this visit:    Pre-op examination//  Cataract of right eye, unspecified cataract type       Medically stable for surgery. Type 1 diabetes mellitus with retinopathy of right eye without macular edema, unspecified retinopathy severity    Gastroesophageal reflux disease without esophagitis  Stable with protonix    Encounter for medication monitoring//  Encounter for long-term (current) insulin use ([facility]      Follow-up Disposition: Not on File  reviewed diet, exercise and weight control  cardiovascular risk and specific lipid/LDL goals reviewed  reviewed medications and side effects in detail  specific diabetic recommendations: low cholesterol diet, weight control and daily exercise discussed and annual eye examinations at Ophthalmology discussed     I have discussed diagnosis listed in this note with pt and/or family. I have discussed treatment plans and options and the risk/benefit analysis of those options, including safe use of medications and possible medication side effects. Through the use of shared decision making we have agreed to the above plan. The patient has received an after-visit summary and questions were answered concerning future plans and follow up. Advise pt of any urgent changes then to proceed to the ER.

## 2017-08-21 NOTE — MR AVS SNAPSHOT
Visit Information Date & Time Provider Department Dept. Phone Encounter #  
 8/21/2017  2:00 PM Alejandra Benoit MD [facility] [phone] [id number] Your Appointments 9/13/2017 11:15 AM  
COMPLETE PHYSICAL with Alejandra Benoit MD  
[facility] [address]) Appt Note: CPE per  J/S  
 [address] [facility] [address] [phone] [address] [facility] [address] Upcoming Health Maintenance Date Due FOBT Q 1 YEAR AGE 50-75 12/5/2015 INFLUENZA AGE 9 TO ADULT 8/28/2017* HEMOGLOBIN A1C Q6M 11/16/2017 MICROALBUMIN Q1 5/16/2018 LIPID PANEL Q1 5/16/2018 EYE EXAM RETINAL OR DILATED Q1 6/6/2018 FOOT EXAM Q1 8/8/2018 PAP AKA CERVICAL CYTOLOGY 10/12/2018 BREAST CANCER SCRN MAMMOGRAM 6/1/2019 DTaP/Tdap/Td series (2 - Td) 8/21/2027 *Topic was postponed. The date shown is not the original due date. Allergies as of 8/21/2017  Review Complete On: 8/21/2017 By: Alejandra Benoit MD  
  
 Severity Noted Reaction Type Reactions Percocet [Oxycodone-acetaminophen]  05/04/2013    Other (comments) AMS Current Immunizations  Reviewed on 8/21/2017 Name Date Pneumococcal Polysaccharide (PPSV-23) 5/16/2017 Td, Adsorbed PF 5/4/2013  7:32 PM  
  
 Reviewed by Alejandra Benoit MD on 8/21/2017 at  2:08 PM  
 Reviewed by Alejandra Benoit MD on 8/21/2017 at  2:18 PM  
You Were Diagnosed With   
  
 Codes Comments Pre-op examination    -  Primary ICD-10-CM: B70.475 ICD-9-CM: V72.84 Other cataract of left eye     ICD-10-CM: H26.8 Type 1 diabetes mellitus with retinopathy of right eye without macular edema, unspecified retinopathy severity ([facility]     ICD-10-CM: E10.319 ICD-9-CM: 250.51, 362.01  Encounter for medication monitoring     ICD-10-CM: Z51.81 
ICD-9-CM: V58.83   
 Encounter for long-term (current) use of insulin ([facility]     ICD-10-CM: Z79.4 ICD-9-CM: V58.67 Vitals BP Pulse Temp Resp Height(growth percentile) Weight(growth percentile) 138/77 (BP 1 Location: Left arm, BP Patient Position: Sitting) 70 97.3 °F (36.3 °C) (Oral) 16 5' 1\" (1.549 m) 192 lb 3.2 oz (87.2 kg) LMP SpO2 BMI OB Status Smoking Status 02/01/2001 96% 36.32 kg/m2 Hysterectomy Never Smoker BMI and BSA Data Body Mass Index Body Surface Area  
 36.32 kg/m 2 1.94 m 2 Preferred Pharmacy Pharmacy Name Phone [facility] [address] [phone] Your Updated Medication List  
  
   
This list is accurate as of: 8/21/17  4:09 PM.  Always use your most recent med list.  
  
  
  
  
 atorvastatin 40 mg tablet Commonly known as:  LIPITOR Take 1 Tab by mouth daily. cyanocobalamin 1,000 mcg/mL injection Commonly known as:  VITAMIN B12  
1,000 mcg by IntraMUSCular route every thirty (30) days. ibuprofen 800 mg tablet Commonly known as:  MOTRIN Take 1 Tab by mouth two (2) times daily as needed for Pain. insulin glargine 100 unit/mL (3 mL) Inpn Commonly known as:  LANTUS,BASAGLAR  
70 units twice daily  
  
 insulin lispro 100 unit/mL kwikpen Commonly known as:  HUMALOG Sliding Scale for Blood Sugar above 300  
  
 lisinopril 20 mg tablet Commonly known as:  Brittanie Charley Take 1 Tab by mouth daily. Swapna Pen Needle 32 gauge x 5/32\" Ndle Generic drug:  Insulin Needles (Disposable) ONETOUCH ULTRA TEST strip Generic drug:  glucose blood VI test strips Use to check BS 3-4 times daily  
  
 pantoprazole 40 mg tablet Commonly known as:  PROTONIX Take 1 Tab by mouth daily. Introducing [facility] & HEALTH SERVICES! Dear Christopher Painting: Thank you for requesting a [employer] account.   Our records indicate that you already have an active [employer] account. You can access your account anytime at [email or web address] Did you know that you can access your hospital and ER discharge instructions at any time in [employer]? You can also review all of your test results from your hospital stay or ER visit. Additional Information If you have questions, please visit the Frequently Asked Questions section of the [employer] website at [email or web address] Remember, [employer] is NOT to be used for urgent needs. For medical emergencies, dial 911. Now available from your iPhone and Android! Please provide this summary of care documentation to your next provider. Your primary care clinician is listed as Phys Other. If you have any questions after today's visit, please call [phone].

## 2017-08-24 ENCOUNTER — DOCUMENTATION ONLY (OUTPATIENT)
Dept: FAMILY MEDICINE CLINIC | Age: 52
End: 2017-08-24

## 2017-09-13 ENCOUNTER — HOSPITAL ENCOUNTER (OUTPATIENT)
Dept: LAB | Age: 52
Discharge: HOME | End: 2017-09-13
Payer: COMMERCIAL

## 2017-09-13 ENCOUNTER — OFFICE VISIT (OUTPATIENT)
Dept: FAMILY MEDICINE CLINIC | Age: 52
End: 2017-09-13

## 2017-09-13 VITALS
DIASTOLIC BLOOD PRESSURE: 77 MMHG | SYSTOLIC BLOOD PRESSURE: 144 MMHG | RESPIRATION RATE: 18 BRPM | OXYGEN SATURATION: 97 % | WEIGHT: 192.6 LBS | BODY MASS INDEX: 35.44 KG/M2 | HEART RATE: 86 BPM | HEIGHT: 62 IN | TEMPERATURE: 98 F

## 2017-09-13 DIAGNOSIS — Z12.11 ENCOUNTER FOR SCREENING FECAL OCCULT BLOOD TESTING: ICD-10-CM

## 2017-09-13 DIAGNOSIS — I10 ESSENTIAL HYPERTENSION: ICD-10-CM

## 2017-09-13 DIAGNOSIS — E78.2 MIXED HYPERLIPIDEMIA: ICD-10-CM

## 2017-09-13 DIAGNOSIS — Z00.00 ROUTINE GENERAL MEDICAL EXAMINATION AT A HEALTH CARE FACILITY: Primary | ICD-10-CM

## 2017-09-13 DIAGNOSIS — E10.319 TYPE 1 DIABETES MELLITUS WITH RETINOPATHY OF RIGHT EYE WITHOUT MACULAR EDEMA, UNSPECIFIED RETINOPATHY SEVERITY (HCC): ICD-10-CM

## 2017-09-13 DIAGNOSIS — Z79.4 ENCOUNTER FOR LONG-TERM (CURRENT) USE OF INSULIN (HCC): ICD-10-CM

## 2017-09-13 DIAGNOSIS — Z51.81 ENCOUNTER FOR MEDICATION MONITORING: ICD-10-CM

## 2017-09-13 DIAGNOSIS — Z12.11 COLON CANCER SCREENING: ICD-10-CM

## 2017-09-13 DIAGNOSIS — K59.00 CONSTIPATION, UNSPECIFIED CONSTIPATION TYPE: ICD-10-CM

## 2017-09-13 DIAGNOSIS — Z12.4 SCREENING FOR MALIGNANT NEOPLASM OF CERVIX: ICD-10-CM

## 2017-09-13 LAB
BACTERIA UA POCT, BACTPOCT: NORMAL
BILIRUB UR QL STRIP: NORMAL
CASTS UA POCT: 0
CLUE CELLS, CLUEPOCT: NORMAL
CRYSTALS UA POCT, CRYSPOCT: NEGATIVE
EPITHELIAL CELLS POCT, EPITHPOCT: NORMAL
GLUCOSE POC: 113 MG/DL
GLUCOSE UR-MCNC: NEGATIVE MG/DL
HBA1C MFR BLD HPLC: 10.9 %
HEMOCCULT STL QL: NEGATIVE
KETONES P FAST UR STRIP-MCNC: NORMAL MG/DL
MUCUS UA POCT, MUCPOCT: NORMAL
PH UR STRIP: 7 [PH] (ref 4.6–8)
PROTEIN,URINE POC: NORMAL MG/DL
RBC UA POCT, RBCPOCT: 0
SP GR UR STRIP: 1.01 (ref 1–1.03)
TRICH UA POCT, TRICHPOC: NEGATIVE
UA UROBILINOGEN AMB POC: NORMAL (ref 0.2–1)
URINALYSIS CLARITY POC: CLEAR
URINALYSIS COLOR POC: YELLOW
URINE BLOOD POC: NEGATIVE
URINE LEUKOCYTES POC: NEGATIVE
URINE NITRITES POC: NEGATIVE
VALID INTERNAL CONTROL?: YES
WBC UA POCT, WBCPOCT: NORMAL
YEAST UA POCT, YEASTPOC: NEGATIVE

## 2017-09-13 PROCEDURE — 88175 CYTOPATH C/V AUTO FLUID REDO: CPT | Performed by: FAMILY MEDICINE

## 2017-09-13 RX ORDER — OFLOXACIN 3 MG/ML
SOLUTION/ DROPS OPHTHALMIC
Refills: 0 | COMMUNITY
Start: 2017-09-07 | End: 2018-02-06

## 2017-09-13 RX ORDER — DICLOFENAC SODIUM 1 MG/ML
SOLUTION/ DROPS OPHTHALMIC
Refills: 1 | COMMUNITY
Start: 2017-09-07 | End: 2018-02-06

## 2017-09-13 NOTE — MR AVS SNAPSHOT
Visit Information Date & Time Provider Department Dept. Phone Encounter #  
 9/13/2017 11:15 AM Nikolas Feldman MD [facility] [phone] [id number] Follow-up Instructions Return in about 5 months (around 2/13/2018). Upcoming Health Maintenance Date Due FOBT Q 1 YEAR AGE 50-75 12/5/2015 HEMOGLOBIN A1C Q6M 11/16/2017 MICROALBUMIN Q1 5/16/2018 LIPID PANEL Q1 5/16/2018 EYE EXAM RETINAL OR DILATED Q1 6/6/2018 FOOT EXAM Q1 9/13/2018 PAP AKA CERVICAL CYTOLOGY 10/12/2018 BREAST CANCER SCRN MAMMOGRAM 6/1/2019 DTaP/Tdap/Td series (2 - Td) 8/21/2027 Allergies as of 9/13/2017  Review Complete On: 9/13/2017 By: Nikolas Feldman MD  
  
 Severity Noted Reaction Type Reactions Percocet [Oxycodone-acetaminophen]  05/04/2013    Other (comments) AMS Current Immunizations  Reviewed on 9/13/2017 Name Date Pneumococcal Polysaccharide (PPSV-23) 5/16/2017 Td, Adsorbed PF 5/4/2013  7:32 PM  
  
 Reviewed by Nikolas Feldman MD on 9/13/2017 at 12:21 PM  
You Were Diagnosed With   
  
 Codes Comments Routine general medical examination at a health care facility    -  Primary ICD-10-CM: Z00.00 ICD-9-CM: V70.0 Essential hypertension     ICD-10-CM: I10 
ICD-9-CM: 401.9 Type 1 diabetes mellitus with retinopathy of right eye without macular edema, unspecified retinopathy severity ([facility]     ICD-10-CM: E10.319 ICD-9-CM: 250.51, 362.01 Mixed hyperlipidemia     ICD-10-CM: E78.2 ICD-9-CM: 272.2 Encounter for medication monitoring     ICD-10-CM: Z51.81 
ICD-9-CM: V58.83 Encounter for long-term (current) use of insulin [facility]     ICD-10-CM: Z79.4 ICD-9-CM: V58.67 Constipation, unspecified constipation type     ICD-10-CM: K59.00 ICD-9-CM: 564.00 Colon cancer screening     ICD-10-CM: Z12.11 ICD-9-CM: V76.51 Vitals BP Pulse Temp Resp Height(growth percentile) Weight(growth percentile) 144/77 (BP 1 Location: Left arm, BP Patient Position: Sitting) 86 98 °F (36.7 °C) (Oral) 18 5' 2\" (1.575 m) 192 lb 9.6 oz (87.4 kg) LMP SpO2 BMI OB Status Smoking Status 02/01/2001 97% 35.23 kg/m2 Hysterectomy Never Smoker Vitals History BMI and BSA Data Body Mass Index Body Surface Area  
 35.23 kg/m 2 1.96 m 2 Preferred Pharmacy Pharmacy Name Phone [facility] [address] [place] [address], [facility] [address] [phone] Your Updated Medication List  
  
   
This list is accurate as of: 9/13/17  1:04 PM.  Always use your most recent med list.  
  
  
  
  
 atorvastatin 40 mg tablet Commonly known as:  LIPITOR Take 1 Tab by mouth daily. cyanocobalamin 1,000 mcg/mL injection Commonly known as:  VITAMIN B12  
1,000 mcg by IntraMUSCular route every thirty (30) days. diclofenac 0.1 % ophthalmic solution Commonly known as:  VOLTAREN  
PLACE 1 GTT INTO RIGHT EYE QID  
  
 ibuprofen 800 mg tablet Commonly known as:  MOTRIN Take 1 Tab by mouth two (2) times daily as needed for Pain. insulin glargine 100 unit/mL (3 mL) Inpn Commonly known as:  LANTUS,BASAGLAR  
70 units twice daily  
  
 insulin lispro 100 unit/mL kwikpen Commonly known as:  HUMALOG Sliding Scale for Blood Sugar above 300  
  
 lisinopril 20 mg tablet Commonly known as:  Jensen Neel Take 1 Tab by mouth daily. Swapna Pen Needle 32 gauge x 5/32\" Ndle Generic drug:  Insulin Needles (Disposable)  
  
 ofloxacin 0.3 % ophthalmic solution Commonly known as:  FLOXIN  
PLACE 1 DROP INTO LEFT EYE QID FOR 7 DAYS  
  
 ONETOUCH ULTRA TEST strip Generic drug:  glucose blood VI test strips Use to check BS 3-4 times daily  
  
 pantoprazole 40 mg tablet Commonly known as:  PROTONIX Take 1 Tab by mouth daily. We Performed the Following AMB POC EKG ROUTINE W/ 12 LEADS, INTER & REP [10708 CPT(R)] AMB POC GLUCOSE, QUANTITATIVE, BLOOD [74058 CPT(R)] AMB POC HEMOGLOBIN A1C [12548 CPT(R)] AMB POC URINALYSIS DIP STICK AUTO W/ MICRO  [96549 CPT(R)] LIPID PANEL [89809 CPT(R)] METABOLIC PANEL, BASIC [10002 CPT(R)] REFERRAL TO GASTROENTEROLOGY [GRW96 Custom] Follow-up Instructions Return in about 5 months (around 2/13/2018). Referral Information Referral ID Referred By Referred To  
  
 [id number] Jarad Jay MD   
   [address] [place] [address] 202 [id number] , [address] Phone: [phone] Fax: [phone] Visits Status Start Date End Date 1 New Request 9/13/17 9/13/18 If your referral has a status of pending review or denied, additional information will be sent to support the outcome of this decision. Introducing [facility] & HEALTH SERVICES! Dear Leon Damian: Thank you for requesting a [employer] account. Our records indicate that you already have an active [employer] account. You can access your account anytime at [email or web address] [employer] Did you know that you can access your hospital and ER discharge instructions at any time in [employer]? You can also review all of your test results from your hospital stay or ER visit. Additional Information If you have questions, please visit the Frequently Asked Questions section of the [employer] website at [email or web address] Remember, [employer] is NOT to be used for urgent needs. For medical emergencies, dial 911. Now available from your iPhone and Android! Please provide this summary of care documentation to your next provider. Your primary care clinician is listed as Phys Other. If you have any questions after today's visit, please call [phone].

## 2017-09-13 NOTE — PROGRESS NOTES
Subjective:   46 y.o. female for [employer] Woman Check. Patient's last menstrual period was 02/01/2001. Cardiovascular Review:  The patient has hypertension and hyperlipidemia. Diet and Lifestyle: generally follows a low fat low cholesterol diet, generally follows a low sodium diet, exercises sporadically, nonsmoker  Home BP Monitoring: is not measured at home. Pertinent ROS: taking medications as instructed, no medication side effects noted, no TIA's, no chest pain on exertion, no dyspnea on exertion, no swelling of ankles, no palpitations, no muscle aches or pain. Diabetes Mellitus: Followed by endo and seen on last month. Pt reports that her a1c level was back up to 10%. Her SS was adjusted per endo. She has diabetes mellitus type 1 dx at the age of 25. She has been on insulin since her diagnoses. She admits that her glucose control has not been optimal.  She has retinopathy. Diabetic ROS - medication compliance: compliant most of the time, diabetic diet compliance: compliant most of the time, home glucose monitoring: is performed regularly, check BS 3 to 4 times in a day for SS humalog insulin, further diabetic ROS: no chest pain, dyspnea or TIA's, no numbness, tingling or pain in extremities. Lab review: orders written for new lab studies as appropriate; see orders.      Patient Active Problem List   Diagnosis Code    Gastroesophageal reflux disease without esophagitis K21.9    History of hysterectomy for benign disease Z90.710    Diabetic retinopathy ([address] E11.319    Uncontrolled type 1 diabetes mellitus with retinopathy of right eye ([address] E10.319, E10.65    Type 1 diabetes mellitus with retinopathy of right eye without macular edema ([address] E10.319    Encounter for long-term (current) use of insulin ([address] Z79.4    Encounter for medication monitoring Z51.81    Essential hypertension I10    Mixed hyperlipidemia E78.2       Current Outpatient Prescriptions   Medication Sig Dispense Refill    diclofenac (VOLTAREN) 0.1 % ophthalmic solution PLACE 1 GTT INTO RIGHT EYE QID  1    ofloxacin (FLOXIN) 0.3 % ophthalmic solution PLACE 1 DROP INTO LEFT EYE QID FOR 7 DAYS  0    ibuprofen (MOTRIN) 800 mg tablet Take 1 Tab by mouth two (2) times daily as needed for Pain. 60 Tab 2    pantoprazole (PROTONIX) 40 mg tablet Take 1 Tab by mouth daily. 30 Tab 6    ONETOUCH ULTRA TEST strip Use to check BS 3-4 times daily      PER PEN NEEDLE 32 gauge x \" ndle       cyanocobalamin (VITAMIN B12) 1,000 mcg/mL injection 1,000 mcg by IntraMUSCular route every thirty (30) days.  atorvastatin (LIPITOR) 40 mg tablet Take 1 Tab by mouth daily. 90 Tab 1    lisinopril (PRINIVIL, ZESTRIL) 20 mg tablet Take 1 Tab by mouth daily.  90 Tab 1    insulin glargine (LANTUS SOLOSTAR) 100 unit/mL (3 mL) pen 70 units twice daily 50 Each 3    insulin lispro (HUMALOG) 100 unit/mL kwikpen Sliding Scale for Blood Sugar above 300 (Patient taking differently: BS   Take 30 units  -200 Take 35 units  -250 Take 40 units  -300 Take 45 units  BS greater than 301  Take 50 units.) 1 Package 3       Allergies   Allergen Reactions    Percocet [Oxycodone-Acetaminophen] Other (comments)     AMS       Past Medical History:   Diagnosis Date    Diabetes ([address]     Diabetic retinopathy ([address]     right eye     Gestational diabetes     Hypercholesterolemia     Hypertension     Type 1 diabetes ([address]        Past Surgical History:   Procedure Laterality Date    HX BREAST REDUCTION Bilateral     HX  SECTION  1990    HX  SECTION  2000    HX COLONOSCOPY      HX HEENT  2016    right eye surgery- repaired blood vessel    HX HEENT  2017    right eye surgery- removal of scar tissue    HX HYSTERECTOMY         Family History   Problem Relation Age of Onset    Hypertension Mother     Diabetes Father     No Known Problems Brother     Asthma Daughter     Asthma Son    [facility] Psoriasis Son Social History   Substance Use Topics    Smoking status: Never Smoker    Smokeless tobacco: Never Used    Alcohol use No        Lab Results  Component Value Date/Time   WBC 6.1 05/16/2017 03:47 PM   HGB 12.6 05/16/2017 03:47 PM   HCT 41.5 05/16/2017 03:47 PM   PLATELET 530 88/02/2724 03:47 PM   MCV 85 05/16/2017 03:47 PM     Lab Results  Component Value Date/Time   Cholesterol, total 227 05/16/2017 03:47 PM   HDL Cholesterol 64 05/16/2017 03:47 PM   LDL, calculated 134 05/16/2017 03:47 PM   Triglyceride 146 05/16/2017 03:47 PM   CHOL/HDL Ratio 3.1 02/25/2010 08:25 AM   Lab Results  Component Value Date/Time   TSH 0.942 05/16/2017 03:47 PM      Lab Results   Component Value Date/Time    Sodium 138 05/16/2017 03:47 PM    Potassium 4.2 06/02/2017 08:06 AM    Chloride 98 05/16/2017 03:47 PM    CO2 23 05/16/2017 03:47 PM    Anion gap 8 02/25/2010 08:25 AM    Glucose 459 05/16/2017 03:47 PM    BUN 12 05/16/2017 03:47 PM    Creatinine 1.18 05/16/2017 03:47 PM    BUN/Creatinine ratio 10 05/16/2017 03:47 PM    GFR est AA 62 05/16/2017 03:47 PM    GFR est non-AA 54 05/16/2017 03:47 PM    Calcium 9.4 05/16/2017 03:47 PM    Bilirubin, total <0.2 05/16/2017 03:47 PM    ALT (SGPT) 32 05/16/2017 03:47 PM    AST (SGOT) 22 05/16/2017 03:47 PM    Alk. phosphatase 124 05/16/2017 03:47 PM    Protein, total 6.6 05/16/2017 03:47 PM    Albumin 4.1 05/16/2017 03:47 PM    Globulin 3.3 02/25/2010 08:25 AM    A-G Ratio 1.6 05/16/2017 03:47 PM      Lab Results   Component Value Date/Time    Hemoglobin A1c 11.5 02/25/2010 08:25 AM    Hemoglobin A1c (POC) 8.3 05/16/2017 03:47 PM      ROS:  Feeling well. No dyspnea or chest pain on exertion. No abdominal pain, has had more constipation over the last few months. Feels bloated at times and has to take stool softner to get her bowels to move. No black or bloody stools. No urinary tract symptoms.  GYN ROS: no breast pain or new or enlarging lumps on self exam, no discharge or pelvic pain, no hot flashes. No neurological complaints. Objective:     Visit Vitals    /77 (BP 1 Location: Left arm, BP Patient Position: Sitting)    Pulse 86    Temp 98 °F (36.7 °C) (Oral)    Resp 18    Ht 5' 2\" (1.575 m)    Wt 192 lb 9.6 oz (87.4 kg)    LMP 02/01/2001    SpO2 97%    BMI 35.23 kg/m2     The patient appears well, alert, oriented x 3, in no distress. ENT normal.  Neck supple. No adenopathy or thyromegaly. DORA. Lungs are clear, good air entry, no wheezes, rhonchi or rales. S1 and S2 normal, no murmurs, regular rate and rhythm. Abdomen soft without tenderness, guarding, mass or organomegaly. Extremities show no edema, normal peripheral pulses. Neurological is normal, no focal findings. BREAST EXAM: breasts appear normal, no suspicious masses, no skin or nipple changes or axillary nodes    PELVIC EXAM: CERVIX: normal appearing cervix without discharge or lesions, RECTAL: normal rectal, no masses, guaiac negative stool obtained, PAP: Pap smear done today    Assessment/Plan:   well woman  mammogram  pap smear  counseled on breast self exam, mammography screening, menopause, osteoporosis and adequate intake of calcium and vitamin D  additional lab tests per orders  Diagnoses and all orders for this visit:    1. Routine general medical examination at a health care facility  -     AMB POC URINALYSIS DIP STICK AUTO W/ MICRO     2. Essential hypertension  -     AMB POC EKG ROUTINE W/ 12 LEADS, INTER & REP    3. Type 1 diabetes mellitus with retinopathy of right eye without macular edema, unspecified retinopathy severity (HCC)  -     AMB POC GLUCOSE, QUANTITATIVE, BLOOD  -     AMB POC HEMOGLOBIN A1C    4. Mixed hyperlipidemia  -     LIPID PANEL    5. Encounter for medication monitoring//  6. Encounter for long-term (current) use of insulin ([address]  -     METABOLIC PANEL, BASIC    7. Constipation, unspecified constipation type  8.  Colon cancer screening  -     REFERRAL TO GASTROENTEROLOGY      Follow-up Disposition:  Return in about 5 months (around 2/13/2018). reviewed diet, exercise and weight control  cardiovascular risk and specific lipid/LDL goals reviewed  reviewed medications and side effects in detail  specific diabetic recommendations: low cholesterol diet, weight control and daily exercise discussed, home glucose monitoring emphasized, foot care discussed and Podiatry visits discussed, annual eye examinations at Ophthalmology discussed and glycohemoglobin and other lab monitoring discussed. I have discussed diagnosis listed in this note with pt and/or family. I have discussed treatment plans and options and the risk/benefit analysis of those options, including safe use of medications and possible medication side effects. Through the use of shared decision making we have agreed to the above plan. The patient has received an after-visit summary and questions were answered concerning future plans and follow up. Advise pt of any urgent changes then to proceed to the ER.

## 2017-09-14 LAB
BUN SERPL-MCNC: 10 MG/DL (ref 6–24)
BUN/CREAT SERPL: 11 (ref 9–23)
CALCIUM SERPL-MCNC: 9.2 MG/DL (ref 8.7–10.2)
CHLORIDE SERPL-SCNC: 97 MMOL/L (ref 96–106)
CHOLEST SERPL-MCNC: 223 MG/DL (ref 100–199)
CO2 SERPL-SCNC: 27 MMOL/L (ref 18–29)
CREAT SERPL-MCNC: 0.89 MG/DL (ref 0.57–1)
GLUCOSE SERPL-MCNC: 114 MG/DL (ref 65–99)
HDLC SERPL-MCNC: 62 MG/DL
INTERPRETATION, 910389: NORMAL
LDLC SERPL CALC-MCNC: 130 MG/DL (ref 0–99)
POTASSIUM SERPL-SCNC: 3.7 MMOL/L (ref 3.5–5.2)
SODIUM SERPL-SCNC: 140 MMOL/L (ref 134–144)
TRIGL SERPL-MCNC: 154 MG/DL (ref 0–149)
VLDLC SERPL CALC-MCNC: 31 MG/DL (ref 5–40)

## 2017-10-09 ENCOUNTER — PATIENT MESSAGE (OUTPATIENT)
Dept: FAMILY MEDICINE CLINIC | Age: 52
End: 2017-10-09

## 2017-10-09 DIAGNOSIS — E78.5 HYPERLIPIDEMIA, UNSPECIFIED HYPERLIPIDEMIA TYPE: ICD-10-CM

## 2017-10-17 ENCOUNTER — HOSPITAL ENCOUNTER (OUTPATIENT)
Dept: ULTRASOUND IMAGING | Age: 52
Discharge: HOME | End: 2017-10-17
Attending: SPECIALIST
Payer: COMMERCIAL

## 2017-10-17 DIAGNOSIS — K59.00 CONSTIPATION: ICD-10-CM

## 2017-10-17 DIAGNOSIS — K21.9 GASTROESOPHAGEAL REFLUX DISEASE: ICD-10-CM

## 2017-10-17 DIAGNOSIS — R14.0 METEORISM: ICD-10-CM

## 2017-10-17 DIAGNOSIS — E11.9 DIABETES MELLITUS (HCC): ICD-10-CM

## 2017-10-17 PROCEDURE — 76830 TRANSVAGINAL US NON-OB: CPT

## 2017-10-17 PROCEDURE — 76856 US EXAM PELVIC COMPLETE: CPT

## 2017-10-19 ENCOUNTER — HOSPITAL ENCOUNTER (OUTPATIENT)
Dept: GENERAL RADIOLOGY | Age: 52
Discharge: HOME | End: 2017-10-19
Payer: COMMERCIAL

## 2017-10-19 DIAGNOSIS — K59.00 CONSTIPATION: ICD-10-CM

## 2017-10-19 DIAGNOSIS — K21.9 GASTROESOPHAGEAL REFLUX DISEASE: ICD-10-CM

## 2017-10-19 DIAGNOSIS — E11.9 DIABETES MELLITUS (HCC): ICD-10-CM

## 2017-10-19 DIAGNOSIS — R14.0 BLOATING: ICD-10-CM

## 2017-10-19 PROCEDURE — 74000 XR ABD (KUB): CPT

## 2017-10-22 RX ORDER — ATORVASTATIN CALCIUM 40 MG/1
40 TABLET, FILM COATED ORAL DAILY
Qty: 90 TAB | Refills: 1 | Status: SHIPPED | OUTPATIENT
Start: 2017-10-22 | End: 2018-05-07

## 2017-10-23 ENCOUNTER — HOSPITAL ENCOUNTER (OUTPATIENT)
Dept: GENERAL RADIOLOGY | Age: 52
Discharge: HOME | End: 2017-10-23
Payer: COMMERCIAL

## 2017-10-23 DIAGNOSIS — K59.00 CONSTIPATION: ICD-10-CM

## 2017-10-23 DIAGNOSIS — R14.0 ABDOMINAL DISTENTION: ICD-10-CM

## 2017-10-23 DIAGNOSIS — K21.9 GASTROESOPHAGEAL REFLUX DISEASE: ICD-10-CM

## 2017-10-23 DIAGNOSIS — E11.9 DIABETES MELLITUS (HCC): ICD-10-CM

## 2017-10-23 PROCEDURE — 74000 XR ABD (KUB): CPT

## 2018-02-05 NOTE — PROGRESS NOTES
HISTORY OF PRESENT ILLNESS  Nara Gutierrez is a 46 y.o. female. HPI  Follow up on chronic medical problems. Overall has been feeling well. Cardiovascular Review:  The patient has hypertension and hyperlipidemia. Diet and Lifestyle: generally follows a low fat low cholesterol diet, generally follows a low sodium diet, exercises sporadically, nonsmoker  Home BP Monitoring: is not measured at home. Pertinent ROS: taking medications as instructed, no medication side effects noted, no TIA's, no chest pain on exertion, no dyspnea on exertion, no swelling of ankles, no palpitations, no muscle aches or pain. Diabetes Mellitus:  She has diabetes mellitus type 1 dx at the age of 25. She has been on insulin since her diagnoses. She is followed by [facility] but wants to transfer her care here. She admits that her glucose control has not been optimal.  She thinks that her last a1c level was ~11%. She has retinopathy. Her vision is improved since her surgery in March 2017. Diabetic ROS - medication compliance: compliant most of the time, diabetic diet compliance: compliant most of the time, home glucose monitoring: is performed regularly, check BS 3 to 4 times in a day for SS humalog insulin, further diabetic ROS: no chest pain, dyspnea or TIA's, no numbness, tingling or pain in extremities. Lab review: orders written for new lab studies as appropriate; see orders. Review of Systems   Constitutional: Negative for malaise/fatigue. HENT: Negative for congestion. Eyes: Negative for blurred vision. Respiratory: Negative for cough and shortness of breath. Cardiovascular: Negative for chest pain, palpitations and leg swelling. Gastrointestinal: Positive for constipation. Negative for abdominal pain and heartburn. Genitourinary: Negative for dysuria, frequency and urgency. Musculoskeletal: Negative for back pain and joint pain. Neurological: Negative for dizziness, tingling and headaches. Endo/Heme/Allergies: Negative for environmental allergies. Psychiatric/Behavioral: Negative for depression. The patient does not have insomnia. Physical Exam   Constitutional: She appears well-developed and well-nourished. /70  Pulse 85  Temp 97.9 °F (36.6 °C) (Oral)   Resp 16  Ht 5' 2\" (1.575 m)  Wt 190 lb 6.4 oz (86.4 kg)  LMP  (LMP Unknown)  SpO2 98%  BMI 34.82 kg/m2     HENT:   Right Ear: Tympanic membrane and ear canal normal.   Left Ear: Tympanic membrane and ear canal normal.   Nose: No mucosal edema or rhinorrhea. Mouth/Throat: Oropharynx is clear and moist and mucous membranes are normal.   Neck: Normal range of motion. Neck supple. No thyromegaly present. Cardiovascular: Normal rate and regular rhythm. No murmur heard. Pulmonary/Chest: Effort normal and breath sounds normal.   Abdominal: Soft. Bowel sounds are normal. There is no tenderness. Musculoskeletal: Normal range of motion. She exhibits no edema. Lymphadenopathy:     She has no cervical adenopathy. Skin: Skin is warm and dry. Psychiatric: She has a normal mood and affect. Nursing note and vitals reviewed. ASSESSMENT and PLAN  Diagnoses and all orders for this visit:    1. Essential hypertension  Discussed sodium restriction, high k rich diet, maintaining ideal body weight and regular exercise program such as daily walking 30 min perday 4-5 times per week, as physiologic means to achieve blood pressure control.  Medication compliance advised. 2. Uncontrolled type 2 diabetes mellitus with complication, with long-term current use of insulin ([address]  Declined review for her to meet with [place] to go over her diet.     -     AMB POC HEMOGLOBIN A1C  -     AMB POC GLUCOSE, QUANTITATIVE, BLOOD  -     Increase insulin glargine (LANTUS,BASAGLAR) 100 unit/mL (3 mL) inpn; 75 units twice daily  -     insulin lispro (HUMALOG) 100 unit/mL kwikpen; BS   Take 30 units  -200 Take 35 units  -250 Take 40 units  -300 Take 45 units  BS greater than 301  Take 50 units. 3. Mixed hyperlipidemia  -     LIPID PANEL    4. Encounter for medication monitoring//  5. Encounter for long-term (current) use of insulin (HCC)  -     METABOLIC PANEL, COMPREHENSIVE  -     CBC W/O DIFF    6. Chronic Constipation  Has follow up with GI on next month. Will decide then if colonoscopy needs to be repeated. Follow-up Disposition:  Return in about 3 months (around 5/6/2018). reviewed diet, exercise and weight control  cardiovascular risk and specific lipid/LDL goals reviewed  reviewed medications and side effects in detail  specific diabetic recommendations: low cholesterol diet, weight control and daily exercise discussed, home glucose monitoring emphasized, foot care discussed and Podiatry visits discussed, annual eye examinations at Ophthalmology discussed, glycohemoglobin and other lab monitoring discussed and long term diabetic complications discussed     I have discussed diagnosis listed in this note with pt and/or family. I have discussed treatment plans and options and the risk/benefit analysis of those options, including safe use of medications and possible medication side effects. Through the use of shared decision making we have agreed to the above plan. The patient has received an after-visit summary and questions were answered concerning future plans and follow up. Advise pt of any urgent changes then to proceed to the ER.

## 2018-02-06 ENCOUNTER — OFFICE VISIT (OUTPATIENT)
Dept: FAMILY MEDICINE CLINIC | Age: 53
End: 2018-02-06

## 2018-02-06 VITALS
TEMPERATURE: 97.9 F | HEIGHT: 62 IN | DIASTOLIC BLOOD PRESSURE: 70 MMHG | BODY MASS INDEX: 35.04 KG/M2 | WEIGHT: 190.4 LBS | SYSTOLIC BLOOD PRESSURE: 136 MMHG | RESPIRATION RATE: 16 BRPM | HEART RATE: 85 BPM | OXYGEN SATURATION: 98 %

## 2018-02-06 DIAGNOSIS — I10 ESSENTIAL HYPERTENSION: Primary | ICD-10-CM

## 2018-02-06 DIAGNOSIS — Z79.4 ENCOUNTER FOR LONG-TERM (CURRENT) USE OF INSULIN (HCC): ICD-10-CM

## 2018-02-06 DIAGNOSIS — Z51.81 ENCOUNTER FOR MEDICATION MONITORING: ICD-10-CM

## 2018-02-06 DIAGNOSIS — E78.2 MIXED HYPERLIPIDEMIA: ICD-10-CM

## 2018-02-06 DIAGNOSIS — K59.09 CHRONIC CONSTIPATION: ICD-10-CM

## 2018-02-06 LAB
GLUCOSE POC: 239 MG/DL
HBA1C MFR BLD HPLC: 10.6 %

## 2018-02-06 RX ORDER — INSULIN GLARGINE 100 [IU]/ML
INJECTION, SOLUTION SUBCUTANEOUS
Qty: 75 ML | Refills: 3 | Status: SHIPPED | OUTPATIENT
Start: 2018-02-06 | End: 2018-05-07 | Stop reason: SDUPTHER

## 2018-02-06 RX ORDER — INSULIN LISPRO 100 [IU]/ML
INJECTION, SOLUTION INTRAVENOUS; SUBCUTANEOUS
Qty: 1 PACKAGE | Refills: 3 | Status: SHIPPED | OUTPATIENT
Start: 2018-02-06 | End: 2018-05-07 | Stop reason: SDUPTHER

## 2018-02-06 NOTE — PROGRESS NOTES
Chief Complaint   Patient presents with    Cholesterol Problem     3m f/u    Hypertension     3m f/u    Diabetes     3m f/u     1. Have you been to the ER, urgent care clinic since your last visit? Hospitalized since your last visit? No    2. Have you seen or consulted any other health care providers outside of the [address] [place] [address] since your last visit? Include any pap smears or colon screening. Dr. Wash Riedel (GI) 11/2017, next appt 2/2018.

## 2018-02-06 NOTE — MR AVS SNAPSHOT
[facility] 
 
 
 [address] [id number] [phone] Patient: Sri Lizarraga MRN: [id number] :1965 Visit Information Date & Time Provider Department Dept. Phone Encounter #  
 2018  8:30 AM Manolo Martinez Isauro [phone] [id number] Follow-up Instructions Return in about 3 months (around 2018). Upcoming Health Maintenance Date Due FOBT Q 1 YEAR AGE 50-75 2015 HEMOGLOBIN A1C Q6M 3/13/2018 MICROALBUMIN Q1 2018 EYE EXAM RETINAL OR DILATED Q1 2018 FOOT EXAM Q1 2018 LIPID PANEL Q1 2018 BREAST CANCER SCRN MAMMOGRAM 2019 PAP AKA CERVICAL CYTOLOGY 2020 DTaP/Tdap/Td series (2 - Td) 2027 Allergies as of 2018  Review Complete On: 2018 By: Inocencia Greer MD  
  
 Severity Noted Reaction Type Reactions Percocet [Oxycodone-acetaminophen]  2013    Other (comments) AMS Current Immunizations  Reviewed on 2018 Name Date Influenza Vaccine 10/1/2017 Pneumococcal Polysaccharide (PPSV-23) 2017 Td, Adsorbed PF 2013  7:32 PM  
  
 Reviewed by Argenis Pulliam LPN on  at  6:33 AM  
You Were Diagnosed With   
  
 Codes Comments Essential hypertension    -  Primary ICD-10-CM: I10 
ICD-9-CM: 401.9 Uncontrolled type 1 diabetes mellitus with retinopathy of right eye, macular edema presence unspecified, unspecified retinopathy severity ([facility]     ICD-10-CM: E10.319, E10.65 ICD-9-CM: 250.53, 362.01 Mixed hyperlipidemia     ICD-10-CM: E78.2 ICD-9-CM: 272.2 Encounter for medication monitoring     ICD-10-CM: Z51.81 
ICD-9-CM: V58.83 Uncontrolled type 2 diabetes mellitus with complication, with long-term current use of insulin (HCC)     ICD-10-CM: E11.8, E11.65, Z79.4 ICD-9-CM: 250.82, V58.67 Vitals BP Pulse Temp Resp Height(growth percentile) Weight(growth percentile) 141/73 (BP 1 Location: Left arm, BP Patient Position: Sitting) 85 97.9 °F (36.6 °C) (Oral) 16 5' 2\" (1.575 m) 190 lb 6.4 oz (86.4 kg) LMP SpO2 BMI OB Status Smoking Status (LMP Unknown) 98% 34.82 kg/m2 Hysterectomy Never Smoker Vitals History BMI and BSA Data Body Mass Index Body Surface Area 34.82 kg/m 2 1.94 m 2 Preferred Pharmacy Pharmacy Name Phone [facility] [address], [facility] [address] [phone] Your Updated Medication List  
  
   
This list is accurate as of: 2/6/18  9:36 AM.  Always use your most recent med list.  
  
  
  
  
 atorvastatin 40 mg tablet Commonly known as:  LIPITOR Take 1 Tab by mouth daily. cyanocobalamin 1,000 mcg/mL injection Commonly known as:  VITAMIN B12  
1,000 mcg by IntraMUSCular route every thirty (30) days. ibuprofen 800 mg tablet Commonly known as:  MOTRIN  
TAKE 1 TABLET BY MOUTH TWICE DAILY AS NEEDED FOR PAIN  
  
 insulin glargine 100 unit/mL (3 mL) Inpn Commonly known as:  LANTUS,BASAGLAR  
75 units twice daily  
  
 insulin lispro 100 unit/mL kwikpen Commonly known as:  HUMALOG  
BS   Take 30 units -200 Take 35 units -250 Take 40 units -300 Take 45 units BS greater than 301  Take 50 units. LINZESS 290 mcg Cap capsule Generic drug:  linaclotide Take 290 mcg by mouth Daily (before breakfast). lisinopril 20 mg tablet Commonly known as:  Anel Kalata Take 1 Tab by mouth daily. Swapna Pen Needle 32 gauge x 5/32\" Ndle Generic drug:  Insulin Needles (Disposable) ONETOUCH ULTRA TEST strip Generic drug:  glucose blood VI test strips Use to check BS 3-4 times daily  
  
 pantoprazole 40 mg tablet Commonly known as:  PROTONIX  
TAKE 1 TABLET BY MOUTH DAILY Prescriptions Printed Refills insulin lispro (HUMALOG) 100 unit/mL kwikpen 3 Sig: BS   Take 30 units -200 Take 35 units -250 Take 40 units -300 Take 45 units BS greater than 301  Take 50 units. Class: Print Prescriptions Sent to Pharmacy Refills  
 insulin glargine (LANTUS,BASAGLAR) 100 unit/mL (3 mL) inpn 3 Si units twice daily Class: Normal  
 Pharmacy: [facility] [address] [place] [address], [address] [place] [address] Ph #: [phone] We Performed the Following AMB POC GLUCOSE, QUANTITATIVE, BLOOD [82360 CPT(R)] AMB POC HEMOGLOBIN A1C [95622 CPT(R)] CBC W/O DIFF [67641 CPT(R)] LIPID PANEL [42148 CPT(R)] METABOLIC PANEL, COMPREHENSIVE [85288 CPT(R)] Follow-up Instructions Return in about 3 months (around 2018). Introducing [facility] SERVICES! Dear Yusuf Bryan: Thank you for requesting a [employer] account. Our records indicate that you already have an active [employer] account. You can access your account anytime at [email or web address] Did you know that you can access your hospital and ER discharge instructions at any time in [employer]? You can also review all of your test results from your hospital stay or ER visit. Additional Information If you have questions, please visit the Frequently Asked Questions section of the [employer] website at [email or web address] Remember, [employer] is NOT to be used for urgent needs. For medical emergencies, dial 911. Now available from your iPhone and Android! Please provide this summary of care documentation to your next provider. Your primary care clinician is listed as Amanda Anderson. If you have any questions after today's visit, please call [phone].

## 2018-02-07 ENCOUNTER — TELEPHONE (OUTPATIENT)
Dept: FAMILY MEDICINE CLINIC | Age: 53
End: 2018-02-07

## 2018-02-07 LAB
ALBUMIN SERPL-MCNC: 4.1 G/DL (ref 3.5–5.5)
ALBUMIN/GLOB SERPL: 1.5 {RATIO} (ref 1.2–2.2)
ALP SERPL-CCNC: 189 IU/L (ref 39–117)
ALT SERPL-CCNC: 29 IU/L (ref 0–32)
AST SERPL-CCNC: 17 IU/L (ref 0–40)
BILIRUB SERPL-MCNC: 0.3 MG/DL (ref 0–1.2)
BUN SERPL-MCNC: 14 MG/DL (ref 6–24)
BUN/CREAT SERPL: 15 (ref 9–23)
CALCIUM SERPL-MCNC: 9.5 MG/DL (ref 8.7–10.2)
CHLORIDE SERPL-SCNC: 99 MMOL/L (ref 96–106)
CHOLEST SERPL-MCNC: 227 MG/DL (ref 100–199)
CO2 SERPL-SCNC: 23 MMOL/L (ref 18–29)
CREAT SERPL-MCNC: 0.93 MG/DL (ref 0.57–1)
ERYTHROCYTE [DISTWIDTH] IN BLOOD BY AUTOMATED COUNT: 14.1 % (ref 12.3–15.4)
GFR SERPLBLD CREATININE-BSD FMLA CKD-EPI: 71 ML/MIN/1.73
GFR SERPLBLD CREATININE-BSD FMLA CKD-EPI: 82 ML/MIN/1.73
GLOBULIN SER CALC-MCNC: 2.7 G/DL (ref 1.5–4.5)
GLUCOSE SERPL-MCNC: 247 MG/DL (ref 65–99)
HCT VFR BLD AUTO: 41.8 % (ref 34–46.6)
HDLC SERPL-MCNC: 59 MG/DL
HGB BLD-MCNC: 13 G/DL (ref 11.1–15.9)
INTERPRETATION, 910389: NORMAL
LDLC SERPL CALC-MCNC: 152 MG/DL (ref 0–99)
MCH RBC QN AUTO: 25.3 PG (ref 26.6–33)
MCHC RBC AUTO-ENTMCNC: 31.1 G/DL (ref 31.5–35.7)
MCV RBC AUTO: 81 FL (ref 79–97)
PLATELET # BLD AUTO: 252 X10E3/UL (ref 150–379)
POTASSIUM SERPL-SCNC: 4.6 MMOL/L (ref 3.5–5.2)
PROT SERPL-MCNC: 6.8 G/DL (ref 6–8.5)
RBC # BLD AUTO: 5.14 X10E6/UL (ref 3.77–5.28)
SODIUM SERPL-SCNC: 138 MMOL/L (ref 134–144)
TRIGL SERPL-MCNC: 82 MG/DL (ref 0–149)
VLDLC SERPL CALC-MCNC: 16 MG/DL (ref 5–40)
WBC # BLD AUTO: 6.7 X10E3/UL (ref 3.4–10.8)

## 2018-03-28 ENCOUNTER — OFFICE VISIT (OUTPATIENT)
Dept: FAMILY MEDICINE CLINIC | Age: 53
End: 2018-03-28

## 2018-03-28 ENCOUNTER — DOCUMENTATION ONLY (OUTPATIENT)
Dept: FAMILY MEDICINE CLINIC | Age: 53
End: 2018-03-28

## 2018-03-28 VITALS
WEIGHT: 195 LBS | SYSTOLIC BLOOD PRESSURE: 136 MMHG | HEIGHT: 62 IN | TEMPERATURE: 98.7 F | RESPIRATION RATE: 16 BRPM | HEART RATE: 88 BPM | BODY MASS INDEX: 35.88 KG/M2 | OXYGEN SATURATION: 97 % | DIASTOLIC BLOOD PRESSURE: 70 MMHG

## 2018-03-28 DIAGNOSIS — E10.319 TYPE 1 DIABETES MELLITUS WITH RETINOPATHY OF RIGHT EYE WITHOUT MACULAR EDEMA, UNSPECIFIED RETINOPATHY SEVERITY (HCC): ICD-10-CM

## 2018-03-28 DIAGNOSIS — Z51.81 ENCOUNTER FOR MEDICATION MONITORING: ICD-10-CM

## 2018-03-28 DIAGNOSIS — K59.09 CHRONIC CONSTIPATION: ICD-10-CM

## 2018-03-28 DIAGNOSIS — I10 ESSENTIAL HYPERTENSION: ICD-10-CM

## 2018-03-28 DIAGNOSIS — H25.9 SENILE CATARACT OF LEFT EYE, UNSPECIFIED AGE-RELATED CATARACT TYPE: ICD-10-CM

## 2018-03-28 DIAGNOSIS — Z01.818 PRE-OP EXAM: Primary | ICD-10-CM

## 2018-03-28 DIAGNOSIS — E78.2 MIXED HYPERLIPIDEMIA: ICD-10-CM

## 2018-03-28 DIAGNOSIS — K21.9 GASTROESOPHAGEAL REFLUX DISEASE WITHOUT ESOPHAGITIS: ICD-10-CM

## 2018-03-28 PROBLEM — E66.01 SEVERE OBESITY (BMI 35.0-39.9) WITH COMORBIDITY (HCC): Status: ACTIVE | Noted: 2018-03-28

## 2018-03-28 LAB
BILIRUB UR QL STRIP: NORMAL
GLUCOSE POC: 173 MG/DL
GLUCOSE UR-MCNC: NEGATIVE MG/DL
KETONES P FAST UR STRIP-MCNC: NORMAL MG/DL
PH UR STRIP: 5 [PH] (ref 4.6–8)
PROT UR QL STRIP: NORMAL
SP GR UR STRIP: 1.02 (ref 1–1.03)
UA UROBILINOGEN AMB POC: NORMAL (ref 0.2–1)
URINALYSIS CLARITY POC: CLEAR
URINALYSIS COLOR POC: YELLOW
URINE BLOOD POC: NEGATIVE
URINE LEUKOCYTES POC: NEGATIVE
URINE NITRITES POC: NEGATIVE

## 2018-03-28 RX ORDER — LUBIPROSTONE 24 UG/1
CAPSULE, GELATIN COATED ORAL
Refills: 3 | COMMUNITY
Start: 2018-03-13 | End: 2018-09-10

## 2018-03-28 RX ORDER — PEN NEEDLE, DIABETIC 31 GX5/16"
NEEDLE, DISPOSABLE MISCELLANEOUS
Refills: 3 | COMMUNITY
Start: 2018-02-12 | End: 2018-06-04 | Stop reason: SDUPTHER

## 2018-03-28 NOTE — PROGRESS NOTES
Chief Complaint   Patient presents with    Pre-op Exam     left cataract surgery 4/5/2018 by Dr. Eugenia Stubbs     1. Have you been to the ER, urgent care clinic since your last visit? Hospitalized since your last visit? No    2. Have you seen or consulted any other health care providers outside of the [address] [place] [address] since your last visit? Include any pap smears or colon screening.  No

## 2018-03-28 NOTE — PROGRESS NOTES
HISTORY OF PRESENT ILLNESS  Odalis Dimas is a 46 y.o. female. HPI   Pre-op for left cataract surgery. Overall has been feeling well. Cardiovascular Review:  The patient has hypertension and hyperlipidemia. Diet and Lifestyle: generally follows a low fat low cholesterol diet, generally follows a low sodium diet, exercises sporadically, nonsmoker  Home BP Monitoring: is not measured at home. Pertinent ROS: taking medications as instructed, no medication side effects noted, no TIA's, no chest pain on exertion, no dyspnea on exertion, no swelling of ankles, no palpitations, no muscle aches or pain. Cholesterol is above goal but she has been working on diet and exercise. Diabetes Mellitus:  She has diabetes mellitus type 1 dx at the age of 25. She has been on insulin since her diagnoses. e.  Her glucose levels have been much better since last month with adjusting her insulin. Last a1c level was 10.6%. She has retinopathy. Diabetic ROS - medication compliance: compliant most of the time, diabetic diet compliance: compliant most of the time, home glucose monitoring: is performed regularly, check BS 3 to 4 times in a day for SS humalog insulin, further diabetic ROS: no chest pain, dyspnea or TIA's, no numbness, tingling or pain in extremities. Lab review: orders written for new lab studies as appropriate; see orders.      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 9) with comorbidity (HCC) E66.01       Current Outpatient Prescriptions   Medication Sig Dispense Refill    AMITIZA 24 mcg capsule Take 1 cap by mouth daily  3    BD INSULIN PEN NEEDLE UF SHORT 31 gauge x \" ndle Use to give insulin daily  3    linaclotide (LINZESS) 290 mcg cap capsule Take 290 mcg by mouth Daily (before breakfast).  insulin glargine (LANTUS,BASAGLAR) 100 unit/mL (3 mL) inpn 75 units twice daily 75 mL 3    insulin lispro (HUMALOG) 100 unit/mL kwikpen BS   Take 30 units  -200 Take 35 units  -250 Take 40 units  -300 Take 45 units  BS greater than 301  Take 50 units. 1 Package 3    pantoprazole (PROTONIX) 40 mg tablet TAKE 1 TABLET BY MOUTH DAILY 30 Tab 11    ibuprofen (MOTRIN) 800 mg tablet TAKE 1 TABLET BY MOUTH TWICE DAILY AS NEEDED FOR PAIN 60 Tab 1    atorvastatin (LIPITOR) 40 mg tablet Take 1 Tab by mouth daily. 90 Tab 1    ONETOUCH ULTRA TEST strip Use to check BS 3-4 times daily      cyanocobalamin (VITAMIN B12) 1,000 mcg/mL injection 1,000 mcg by IntraMUSCular route every thirty (30) days.  lisinopril (PRINIVIL, ZESTRIL) 20 mg tablet Take 1 Tab by mouth daily.  90 Tab 1       Allergies   Allergen Reactions    Percocet [Oxycodone-Acetaminophen] Other (comments)     AMS       Past Medical History:   Diagnosis Date    Diabetes ([address]     Diabetic retinopathy ([address]     right eye     Gestational diabetes     Hypercholesterolemia     Hypertension     Type 1 diabetes ([address]        Past Surgical History:   Procedure Laterality Date    HX BREAST REDUCTION Bilateral     HX  SECTION  1990    HX  SECTION  2000    HX COLONOSCOPY      HX HEENT  2016    right eye surgery- repaired blood vessel    HX HEENT  2017    right eye surgery- removal of scar tissue    HX HYSTERECTOMY         Family History   Problem Relation Age of Onset    Hypertension Mother     Diabetes Father     No Known Problems Brother     Asthma Daughter     Asthma Son     Psoriasis Son        Social History   Substance Use Topics    Smoking status: Never Smoker    Smokeless tobacco: Never Used    Alcohol use No        Lab Results  Component Value Date/Time   WBC 6.7 02/06/2018 09:24 AM   HGB 13.0 02/06/2018 09:24 AM   HCT 41.8 02/06/2018 09:24 AM   PLATELET 433 52/81/9213 09:24 AM   MCV 81 02/06/2018 09:24 AM     Lab Results  Component Value Date/Time   Cholesterol, total 227 (H) 02/06/2018 09:24 AM   HDL Cholesterol 59 02/06/2018 09:24 AM   LDL, calculated 152 (H) 02/06/2018 09:24 AM   Triglyceride 82 02/06/2018 09:24 AM   CHOL/HDL Ratio 3.1 02/25/2010 08:25 AM     Lab Results   Component Value Date/Time    Sodium 138 02/06/2018 09:24 AM    Potassium 4.6 02/06/2018 09:24 AM    Chloride 99 02/06/2018 09:24 AM    CO2 23 02/06/2018 09:24 AM    Anion gap 8 02/25/2010 08:25 AM    Glucose 247 (H) 02/06/2018 09:24 AM    BUN 14 02/06/2018 09:24 AM    Creatinine 0.93 02/06/2018 09:24 AM    BUN/Creatinine ratio 15 02/06/2018 09:24 AM    GFR est AA 82 02/06/2018 09:24 AM    GFR est non-AA 71 02/06/2018 09:24 AM    Calcium 9.5 02/06/2018 09:24 AM    Bilirubin, total 0.3 02/06/2018 09:24 AM    ALT (SGPT) 29 02/06/2018 09:24 AM    AST (SGOT) 17 02/06/2018 09:24 AM    Alk. phosphatase 189 (H) 02/06/2018 09:24 AM    Protein, total 6.8 02/06/2018 09:24 AM    Albumin 4.1 02/06/2018 09:24 AM    Globulin 3.3 02/25/2010 08:25 AM    A-G Ratio 1.5 02/06/2018 09:24 AM      Lab Results   Component Value Date/Time    Hemoglobin A1c 11.5 (H) 02/25/2010 08:25 AM    Hemoglobin A1c (POC) 10.6 02/06/2018 09:24 AM         Review of Systems   Constitutional: Negative for malaise/fatigue. HENT: Negative for congestion. Eyes: Negative for blurred vision. Respiratory: Negative for cough and shortness of breath. Cardiovascular: Negative for chest pain, palpitations and leg swelling.    Gastrointestinal: Negative for abdominal pain, constipation and heartburn. Genitourinary: Negative for dysuria, frequency and urgency. Musculoskeletal: Negative for back pain and joint pain. Neurological: Negative for dizziness, tingling and headaches. Endo/Heme/Allergies: Negative for environmental allergies. Psychiatric/Behavioral: Negative for depression. The patient does not have insomnia. Physical Exam   Constitutional: She appears well-developed and well-nourished. /70  Pulse 88  Temp 98.7 °F (37.1 °C) (Oral)   Resp 16  Ht 5' 2\" (1.575 m)  Wt 195 lb (88.5 kg)  LMP  (LMP Unknown)  SpO2 97%  BMI 35.67 kg/m2     HENT:   Right Ear: Tympanic membrane and ear canal normal.   Left Ear: Tympanic membrane and ear canal normal.   Nose: No mucosal edema or rhinorrhea. Mouth/Throat: Oropharynx is clear and moist and mucous membranes are normal.   Neck: Normal range of motion. Neck supple. No thyromegaly present. Cardiovascular: Normal rate and regular rhythm. No murmur heard. Pulmonary/Chest: Effort normal and breath sounds normal.   Abdominal: Soft. Bowel sounds are normal. There is no tenderness. Musculoskeletal: Normal range of motion. She exhibits no edema. Lymphadenopathy:     She has no cervical adenopathy. Skin: Skin is warm and dry. Psychiatric: She has a normal mood and affect. Nursing note and vitals reviewed. ASSESSMENT and PLAN  Diagnoses and all orders for this visit:    1. Pre-op exam//  2. Senile cataract of left eye, unspecified age-related cataract type  Medically stable for surgery. -     AMB POC URINALYSIS DIP STICK AUTO W/ MICRO    3. Essential hypertension  Discussed sodium restriction, high k rich diet, maintaining ideal body weight and regular exercise program such as daily walking 30 min perday 4-5 times per week, as physiologic means to achieve blood pressure control.  Medication compliance advised.      4. Type 1 diabetes mellitus with retinopathy of right eye without macular edema, unspecified retinopathy severity (HCC)  -     AMB POC GLUCOSE, QUANTITATIVE, BLOOD    5. Mixed hyperlipidemia  -     LIPID PANEL    6. Gastroesophageal reflux disease without esophagitis  Stable     7. Chronic constipation  Stable     8. Encounter for medication monitoring      Follow-up Disposition:  Return in about 2 months (around 5/28/2018). reviewed diet, exercise and weight control  cardiovascular risk and specific lipid/LDL goals reviewed  reviewed medications and side effects in detail    I have discussed diagnosis listed in this note with pt and/or family. I have discussed treatment plans and options and the risk/benefit analysis of those options, including safe use of medications and possible medication side effects. Through the use of shared decision making we have agreed to the above plan. The patient has received an after-visit summary and questions were answered concerning future plans and follow up. Advise pt of any urgent changes then to proceed to the ER.

## 2018-03-28 NOTE — MR AVS SNAPSHOT
[facility] 
 
 
 [address] [facility] [place] [address] [id number] [phone] Patient: Rudy Karimi MRN: [id number] :1965 Visit Information Date & Time Provider Department Dept. Phone Encounter #  
 3/28/2018  7:30 AM Ezequiel Rodríguez MD [facility] [phone] [id number] Follow-up Instructions Return in about 2 months (around 2018). Your Appointments 2018  9:00 AM  
ROUTINE CARE with Ezequiel Rodríguez MD  
[facility] Appt Note: f/u  
 [address] [facility] [place] [address]  
[phone] [address] [facility] [address] Upcoming Health Maintenance Date Due FOBT Q 1 YEAR AGE 50-75 2015 MICROALBUMIN Q1 2018 EYE EXAM RETINAL OR DILATED Q1 2018 HEMOGLOBIN A1C Q6M 2018 FOOT EXAM Q1 2018 LIPID PANEL Q1 2019 BREAST CANCER SCRN MAMMOGRAM 2019 PAP AKA CERVICAL CYTOLOGY 2020 DTaP/Tdap/Td series (2 - Td) 2027 Allergies as of 3/28/2018  Review Complete On: 3/28/2018 By: Ezequiel Rodríguez MD  
  
 Severity Noted Reaction Type Reactions Percocet [Oxycodone-acetaminophen]  2013    Other (comments) AMS Current Immunizations  Reviewed on 2018 Name Date Influenza Vaccine 10/1/2017 Pneumococcal Polysaccharide (PPSV-23) 2017 Td, Adsorbed PF 2013  7:32 PM  
  
 Not reviewed this visit You Were Diagnosed With   
  
 Codes Comments Pre-op exam    -  Primary ICD-10-CM: M88.680 ICD-9-CM: V72.84 Senile cataract of left eye, unspecified age-related cataract type     ICD-10-CM: H25.9 ICD-9-CM: 366.10 Type 1 diabetes mellitus with retinopathy of right eye without macular edema, unspecified retinopathy severity ([facility]     ICD-10-CM: E10.319 ICD-9-CM: 250.51, 362.01   
 Encounter for medication monitoring     ICD-10-CM: Z51.81 
ICD-9-CM: V58.83 Essential hypertension     ICD-10-CM: I10 
ICD-9-CM: 401.9 Mixed hyperlipidemia     ICD-10-CM: E78.2 ICD-9-CM: 272.2 Gastroesophageal reflux disease without esophagitis     ICD-10-CM: K21.9 ICD-9-CM: 530.81 Vitals BP Pulse Temp Resp Height(growth percentile) Weight(growth percentile) 136/70 88 98.7 °F (37.1 °C) (Oral) 16 5' 2\" (1.575 m) 195 lb (88.5 kg) LMP SpO2 BMI OB Status Smoking Status (LMP Unknown) 97% 35.67 kg/m2 Hysterectomy Never Smoker Vitals History BMI and BSA Data Body Mass Index Body Surface Area  
 35.67 kg/m 2 1.97 m 2 Preferred Pharmacy Pharmacy Name Phone [facility] [address] [address] [address] [phone] Your Updated Medication List  
  
   
This list is accurate as of 3/28/18  8:05 AM.  Always use your most recent med list.  
  
  
  
  
 AMITIZA 24 mcg capsule Generic drug:  lubiPROStone Take 1 cap by mouth daily  
  
 atorvastatin 40 mg tablet Commonly known as:  LIPITOR Take 1 Tab by mouth daily. BD INSULIN PEN NEEDLE UF SHORT 31 gauge x 5/16\" Ndle Generic drug:  Insulin Needles (Disposable) Use to give insulin daily  
  
 cyanocobalamin 1,000 mcg/mL injection Commonly known as:  VITAMIN B12  
1,000 mcg by IntraMUSCular route every thirty (30) days. ibuprofen 800 mg tablet Commonly known as:  MOTRIN  
TAKE 1 TABLET BY MOUTH TWICE DAILY AS NEEDED FOR PAIN  
  
 insulin glargine 100 unit/mL (3 mL) Inpn Commonly known as:  LANTUS,BASAGLAR  
75 units twice daily  
  
 insulin lispro 100 unit/mL kwikpen Commonly known as:  HUMALOG  
BS   Take 30 units -200 Take 35 units -250 Take 40 units -300 Take 45 units BS greater than 301  Take 50 units. LINZESS 290 mcg Cap capsule Generic drug:  linaclotide Take 290 mcg by mouth Daily (before breakfast). lisinopril 20 mg tablet Commonly known as:  Metta Lawman Take 1 Tab by mouth daily. ONETOUCH ULTRA TEST strip Generic drug:  glucose blood VI test strips Use to check BS 3-4 times daily  
  
 pantoprazole 40 mg tablet Commonly known as:  PROTONIX  
TAKE 1 TABLET BY MOUTH DAILY We Performed the Following AMB POC GLUCOSE, QUANTITATIVE, BLOOD [44675 CPT(R)] AMB POC URINALYSIS DIP STICK AUTO W/ MICRO [53881 CPT(R)] LIPID PANEL [02542 CPT(R)] Follow-up Instructions Return in about 2 months (around 5/28/2018). Introducing [address]! Dear Petr Oropeza: Thank you for requesting a [facility] account. Our records indicate that you already have an active [facility] account. You can access your account anytime at [email or web address] Did you know that you can access your hospital and ER discharge instructions at any time in [facility]? You can also review all of your test results from your hospital stay or ER visit. Additional Information If you have questions, please visit the Frequently Asked Questions section of the [facility] [facility] website at [email or web address] Remember, [facility] is NOT to be used for urgent needs. For medical emergencies, dial 911. Now available from your iPhone and Android! Please provide this summary of care documentation to your next provider. Your primary care clinician is listed as Robina Ford. If you have any questions after today's visit, please call [phone].

## 2018-03-29 LAB
CHOLEST SERPL-MCNC: 198 MG/DL (ref 100–199)
HDLC SERPL-MCNC: 58 MG/DL
INTERPRETATION, 910389: NORMAL
LDLC SERPL CALC-MCNC: 123 MG/DL (ref 0–99)
TRIGL SERPL-MCNC: 84 MG/DL (ref 0–149)
VLDLC SERPL CALC-MCNC: 17 MG/DL (ref 5–40)

## 2018-03-29 RX ORDER — ATORVASTATIN CALCIUM 80 MG/1
80 TABLET, FILM COATED ORAL DAILY
Qty: 30 TAB | Refills: 6 | Status: SHIPPED | OUTPATIENT
Start: 2018-03-29 | End: 2019-04-20 | Stop reason: SDUPTHER

## 2018-03-30 LAB
ALBUMIN SERPL-MCNC: 4.1 G/DL (ref 3.5–5.5)
ALP SERPL-CCNC: 155 IU/L (ref 39–117)
ALT SERPL-CCNC: 31 IU/L (ref 0–32)
AST SERPL-CCNC: 18 IU/L (ref 0–40)
BILIRUB DIRECT SERPL-MCNC: 0.06 MG/DL (ref 0–0.4)
BILIRUB SERPL-MCNC: <0.2 MG/DL (ref 0–1.2)
PROT SERPL-MCNC: 6.5 G/DL (ref 6–8.5)

## 2018-05-07 ENCOUNTER — OFFICE VISIT (OUTPATIENT)
Dept: FAMILY MEDICINE CLINIC | Age: 53
End: 2018-05-07

## 2018-05-07 VITALS
BODY MASS INDEX: 35.26 KG/M2 | HEART RATE: 77 BPM | OXYGEN SATURATION: 97 % | WEIGHT: 191.6 LBS | DIASTOLIC BLOOD PRESSURE: 76 MMHG | TEMPERATURE: 97.9 F | RESPIRATION RATE: 16 BRPM | HEIGHT: 62 IN | SYSTOLIC BLOOD PRESSURE: 133 MMHG

## 2018-05-07 DIAGNOSIS — Z79.4 ENCOUNTER FOR LONG-TERM (CURRENT) USE OF INSULIN (HCC): ICD-10-CM

## 2018-05-07 DIAGNOSIS — N39.0 URINARY TRACT INFECTION WITHOUT HEMATURIA, SITE UNSPECIFIED: ICD-10-CM

## 2018-05-07 DIAGNOSIS — Z01.818 PRE-OP EXAM: Primary | ICD-10-CM

## 2018-05-07 DIAGNOSIS — H35.00 RETINOPATHY: ICD-10-CM

## 2018-05-07 DIAGNOSIS — E10.319 UNCONTROLLED TYPE 1 DIABETES MELLITUS WITH RETINOPATHY OF BOTH EYES, MACULAR EDEMA PRESENCE UNSPECIFIED, UNSPECIFIED RETINOPATHY SEVERITY: ICD-10-CM

## 2018-05-07 DIAGNOSIS — Z12.31 ENCOUNTER FOR SCREENING MAMMOGRAM FOR BREAST CANCER: ICD-10-CM

## 2018-05-07 DIAGNOSIS — E78.2 MIXED HYPERLIPIDEMIA: ICD-10-CM

## 2018-05-07 DIAGNOSIS — I10 ESSENTIAL HYPERTENSION: ICD-10-CM

## 2018-05-07 DIAGNOSIS — E10.65 UNCONTROLLED TYPE 1 DIABETES MELLITUS WITH RETINOPATHY OF BOTH EYES, MACULAR EDEMA PRESENCE UNSPECIFIED, UNSPECIFIED RETINOPATHY SEVERITY: ICD-10-CM

## 2018-05-07 DIAGNOSIS — Z51.81 ENCOUNTER FOR MEDICATION MONITORING: ICD-10-CM

## 2018-05-07 LAB
BILIRUB UR QL STRIP: NORMAL
GLUCOSE POC: 140 MG/DL
GLUCOSE UR-MCNC: NORMAL MG/DL
HBA1C MFR BLD HPLC: 10.3 %
KETONES P FAST UR STRIP-MCNC: NORMAL MG/DL
PH UR STRIP: 5.5 [PH] (ref 4.6–8)
PROT UR QL STRIP: NORMAL
SP GR UR STRIP: 1.02 (ref 1–1.03)
UA UROBILINOGEN AMB POC: NORMAL (ref 0.2–1)
URINALYSIS CLARITY POC: CLEAR
URINALYSIS COLOR POC: YELLOW
URINE BLOOD POC: NEGATIVE
URINE LEUKOCYTES POC: NEGATIVE
URINE NITRITES POC: NEGATIVE

## 2018-05-07 RX ORDER — LISINOPRIL 20 MG/1
20 TABLET ORAL DAILY
Qty: 90 TAB | Refills: 1 | Status: SHIPPED | OUTPATIENT
Start: 2018-05-07 | End: 2019-01-28 | Stop reason: SDUPTHER

## 2018-05-07 RX ORDER — OFLOXACIN 3 MG/ML
SOLUTION/ DROPS OPHTHALMIC
Refills: 0 | COMMUNITY
Start: 2018-04-02 | End: 2018-05-07 | Stop reason: ALTCHOICE

## 2018-05-07 RX ORDER — PREDNISOLONE ACETATE 10 MG/ML
SUSPENSION/ DROPS OPHTHALMIC
Refills: 0 | COMMUNITY
Start: 2018-04-02 | End: 2018-05-07 | Stop reason: ALTCHOICE

## 2018-05-07 RX ORDER — INSULIN GLARGINE 100 [IU]/ML
INJECTION, SOLUTION SUBCUTANEOUS
Qty: 3 ML | Refills: 3
Start: 2018-05-07 | End: 2018-12-14 | Stop reason: ALTCHOICE

## 2018-05-07 RX ORDER — INSULIN LISPRO 100 [IU]/ML
INJECTION, SOLUTION INTRAVENOUS; SUBCUTANEOUS
Qty: 1 PACKAGE | Refills: 3 | Status: SHIPPED | OUTPATIENT
Start: 2018-05-07 | End: 2018-09-10 | Stop reason: SDUPTHER

## 2018-05-07 RX ORDER — INSULIN GLARGINE 100 [IU]/ML
INJECTION, SOLUTION SUBCUTANEOUS
Qty: 80 ML | Refills: 3
Start: 2018-05-07 | End: 2018-05-07 | Stop reason: SDUPTHER

## 2018-05-07 NOTE — PROGRESS NOTES
Chief Complaint   Patient presents with    Pre-op Exam     pt having left eye surgery 5/8/2018 by Dr. Carlin Come

## 2018-05-07 NOTE — PROGRESS NOTES
HISTORY OF PRESENT ILLNESS  Nette Flores is a 46 y.o. female. HPI  Pre-op for avastin intravitreal injection. Overall has been feeling well. Cardiovascular Review:  The patient has hypertension and hyperlipidemia. Diet and Lifestyle: generally follows a low fat low cholesterol diet, generally follows a low sodium diet, exercises sporadically, nonsmoker  Home BP Monitoring: is not measured at home. Pertinent ROS: taking medications as instructed, no medication side effects noted, no TIA's, no chest pain on exertion, no dyspnea on exertion, no swelling of ankles, no palpitations, no muscle aches or pain. Diabetes Mellitus:  She has diabetes mellitus type 1 dx at the age of 25. She has been on insulin since her diagnoses. She is followed by endo but wants to transfer her care here. She admits that her glucose control has not been optimal.  She thinks that her last a1c level was ~11%. She has retinopathy. Her vision is improved since her surgery in March 2017. Diabetic ROS - medication compliance: compliant most of the time, diabetic diet compliance: compliant most of the time, home glucose monitoring: is performed regularly, check BS 3 to 4 times in a day for SS humalog insulin, further diabetic ROS: no chest pain, dyspnea or TIA's, no numbness, tingling or pain in extremities. Lab review: orders written for new lab studies as appropriate; see orders.    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 9) with comorbidity (HCC) E66.01       Current Outpatient Prescriptions   Medication Sig Dispense Refill    lisinopril (PRINIVIL, ZESTRIL) 20 mg tablet Take 1 Tab by mouth daily. 90 Tab 1    insulin lispro (HUMALOG) 100 unit/mL kwikpen BS   Take 32 units   -200 Take 37 units   -250 Take 42 units   -300 Take 47 units   BS greater than 301 take 52 units. 1 Package 3    insulin glargine (LANTUS,BASAGLAR) 100 unit/mL (3 mL) inpn 80 units twice daily 3 mL 3    atorvastatin (LIPITOR) 80 mg tablet Take 1 Tab by mouth daily. New Dose 30 Tab 6    AMITIZA 24 mcg capsule Take 1 cap by mouth daily  3    BD INSULIN PEN NEEDLE UF SHORT 31 gauge x \" ndle Use to give insulin daily  3    linaclotide (LINZESS) 290 mcg cap capsule Take 290 mcg by mouth Daily (before breakfast).  pantoprazole (PROTONIX) 40 mg tablet TAKE 1 TABLET BY MOUTH DAILY 30 Tab 11    ibuprofen (MOTRIN) 800 mg tablet TAKE 1 TABLET BY MOUTH TWICE DAILY AS NEEDED FOR PAIN 60 Tab 1    ONETOUCH ULTRA TEST strip Use to check BS 3-4 times daily      cyanocobalamin (VITAMIN B12) 1,000 mcg/mL injection 1,000 mcg by IntraMUSCular route every thirty (30) days.          Allergies   Allergen Reactions    Percocet [Oxycodone-Acetaminophen] Other (comments)     AMS       Past Medical History:   Diagnosis Date    Diabetes ([address]     Diabetic retinopathy ([address]     right eye     Gestational diabetes     Hypercholesterolemia     Hypertension     Type 1 diabetes ([address]        Past Surgical History:   Procedure Laterality Date    HX BREAST REDUCTION Bilateral     HX CATARACT REMOVAL  2018    left eye    HX  SECTION  1990    HX  SECTION  2000    HX COLONOSCOPY      HX HEENT  2016    right eye surgery- repaired blood vessel    HX HEENT  2017    right eye surgery- removal of scar tissue    HX HYSTERECTOMY Family History   Problem Relation Age of Onset    Hypertension Mother     Diabetes Father     No Known Problems Brother     Asthma Daughter     Asthma Son     Psoriasis Son        Social History   Substance Use Topics    Smoking status: Never Smoker    Smokeless tobacco: Never Used    Alcohol use No     Lab Results  Component Value Date/Time   WBC 6.7 02/06/2018 09:24 AM   HGB 13.0 02/06/2018 09:24 AM   HCT 41.8 02/06/2018 09:24 AM   PLATELET 019 61/87/2536 09:24 AM   MCV 81 02/06/2018 09:24 AM     Lab Results  Component Value Date/Time   Cholesterol, total 198 03/28/2018 08:14 AM   HDL Cholesterol 58 03/28/2018 08:14 AM   LDL, calculated 123 (H) 03/28/2018 08:14 AM   Triglyceride 84 03/28/2018 08:14 AM   CHOL/HDL Ratio 3.1 02/25/2010 08:25 AM     Lab Results  Component Value Date/Time   TSH 0.942 05/16/2017 03:47 PM      Lab Results   Component Value Date/Time    Sodium 138 02/06/2018 09:24 AM    Potassium 4.6 02/06/2018 09:24 AM    Chloride 99 02/06/2018 09:24 AM    CO2 23 02/06/2018 09:24 AM    Anion gap 8 02/25/2010 08:25 AM    Glucose 247 (H) 02/06/2018 09:24 AM    BUN 14 02/06/2018 09:24 AM    Creatinine 0.93 02/06/2018 09:24 AM    BUN/Creatinine ratio 15 02/06/2018 09:24 AM    GFR est AA 82 02/06/2018 09:24 AM    GFR est non-AA 71 02/06/2018 09:24 AM    Calcium 9.5 02/06/2018 09:24 AM    Bilirubin, total <0.2 03/28/2018 08:14 AM    ALT (SGPT) 31 03/28/2018 08:14 AM    AST (SGOT) 18 03/28/2018 08:14 AM    Alk. phosphatase 155 (H) 03/28/2018 08:14 AM    Protein, total 6.5 03/28/2018 08:14 AM    Albumin 4.1 03/28/2018 08:14 AM    Globulin 3.3 02/25/2010 08:25 AM    A-G Ratio 1.5 02/06/2018 09:24 AM      Lab Results   Component Value Date/Time    Hemoglobin A1c 11.5 (H) 02/25/2010 08:25 AM    Hemoglobin A1c (POC) 10.3 05/07/2018 09:40 AM           Review of Systems   Constitutional: Negative for malaise/fatigue. HENT: Negative for congestion.     Respiratory: Negative for cough and shortness of breath. Cardiovascular: Negative for chest pain, palpitations and leg swelling. Gastrointestinal: Negative for abdominal pain and heartburn. Genitourinary: Negative for dysuria, frequency and urgency. Musculoskeletal: Negative for back pain and joint pain. Neurological: Negative for dizziness, tingling and headaches. Endo/Heme/Allergies: Negative for environmental allergies. Psychiatric/Behavioral: Negative for depression. The patient does not have insomnia. Physical Exam   Constitutional: She appears well-developed and well-nourished. /76 (BP 1 Location: Left arm, BP Patient Position: Sitting)  Pulse 77  Temp 97.9 °F (36.6 °C) (Oral)   Resp 16  Ht 5' 2\" (1.575 m)  Wt 191 lb 9.6 oz (86.9 kg)  LMP  (LMP Unknown)  SpO2 97%  BMI 35.04 kg/m2    HENT:   Right Ear: Tympanic membrane and ear canal normal.   Left Ear: Tympanic membrane and ear canal normal.   Nose: No mucosal edema or rhinorrhea. Mouth/Throat: Oropharynx is clear and moist and mucous membranes are normal.   Neck: Normal range of motion. Neck supple. No thyromegaly present. Cardiovascular: Normal rate and regular rhythm. No murmur heard. Pulmonary/Chest: Effort normal and breath sounds normal.   Abdominal: Soft. Bowel sounds are normal. There is no tenderness. Musculoskeletal: Normal range of motion. She exhibits no edema. Lymphadenopathy:     She has no cervical adenopathy. Skin: Skin is warm and dry. Psychiatric: She has a normal mood and affect. Nursing note and vitals reviewed. ASSESSMENT and PLAN  Diagnoses and all orders for this visit:    1. Pre-op exam//  2. Retinopathy  Medically stable for surgery/ injections    3. Essential hypertension  Discussed sodium restriction, high k rich diet, maintaining ideal body weight and regular exercise program such as daily walking 30 min perday 4-5 times per week, as physiologic means to achieve blood pressure control.  Medication compliance advised. 4. Uncontrolled type 1 diabetes mellitus with retinopathy of both eyes, macular edema presence unspecified, unspecified retinopathy severity ([facility]  a1c level is 10.3%  -     MICROALBUMIN, UR, RAND W/ MICROALB/CREAT RATIO  -     AMB POC URINALYSIS DIP STICK AUTO W/ MICRO  -     AMB POC HEMOGLOBIN A1C  -     AMB POC GLUCOSE, QUANTITATIVE, BLOOD  -     lisinopril (PRINIVIL, ZESTRIL) 20 mg tablet; Take 1 Tab by mouth daily.  -     Increase insulin lispro (HUMALOG) 100 unit/mL kwikpen; BS   Take 32 units   -200 Take 37 units   -250 Take 42 units   -300 Take 47 units   BS greater than 301 take 52 units.  -     Increase insulin glargine (LANTUS,BASAGLAR) 100 unit/mL (3 mL) inpn; 80 units twice daily    5. Mixed hyperlipidemia  -     LIPID PANEL    6. Encounter for medication monitoring  -     METABOLIC PANEL, COMPREHENSIVE    7. Encounter for long-term (current) use of insulin ([facility]    8. Encounter for screening mammogram for breast cancer  -     [facility] MAMMO BI SCREENING INCL CAD; Future    9. Urinary tract infection without hematuria, site unspecified  -     CULTURE, URINE      Follow-up Disposition:  Return in about 3 months (around 8/7/2018). reviewed diet, exercise and weight control  cardiovascular risk and specific lipid/LDL goals reviewed  reviewed medications and side effects in detail  specific diabetic recommendations: low cholesterol diet, weight control and daily exercise discussed, all medications, side effects and compliance discussed carefully, foot care discussed and Podiatry visits discussed, annual eye examinations at Ophthalmology discussed, glycohemoglobin and other lab monitoring discussed and long term diabetic complications discussed     I have discussed diagnosis listed in this note with pt and/or family. I have discussed treatment plans and options and the risk/benefit analysis of those options, including safe use of medications and possible medication side effects. Through the use of shared decision making we have agreed to the above plan. The patient has received an after-visit summary and questions were answered concerning future plans and follow up. Advise pt of any urgent changes then to proceed to the ER.

## 2018-05-07 NOTE — MR AVS SNAPSHOT
[address] [facility] 
 
 
 [address] [facility] [address] [address] 
[phone] Patient: Maxine Olsen MRN: [id number] :[id number] Visit Information Date & Time Provider Department Dept. Phone Encounter #  
 2018  9:00 AM Otilia Roth MD [facility] [phone] [id number] Follow-up Instructions Return in about 3 months (around 2018). Upcoming Health Maintenance Date Due FOBT Q 1 YEAR AGE 50-75 2015 MICROALBUMIN Q1 2018 EYE EXAM RETINAL OR DILATED Q1 2018* Influenza Age 5 to Adult 2018 HEMOGLOBIN A1C Q6M 2018 FOOT EXAM Q1 2018 LIPID PANEL Q1 3/28/2019 BREAST CANCER SCRN MAMMOGRAM 2019 PAP AKA CERVICAL CYTOLOGY 2020 DTaP/Tdap/Td series (2 - Td) 2027 *Topic was postponed. The date shown is not the original due date. Allergies as of 2018  Review Complete On: 2018 By: Otilia Roth MD  
  
 Severity Noted Reaction Type Reactions Percocet [Oxycodone-acetaminophen]  2013    Other (comments) AMS Current Immunizations  Reviewed on 2018 Name Date Influenza Vaccine 10/1/2017 Pneumococcal Polysaccharide (PPSV-23) 2017 Td, Adsorbed PF 2013  7:32 PM  
  
 Reviewed by Otilia Roth MD on 2018 at  9:42 AM  
You Were Diagnosed With   
  
 Codes Comments Pre-op exam    -  Primary ICD-10-CM: O69.746 ICD-9-CM: V72.84 Senile cataract of left eye, unspecified age-related cataract type     ICD-10-CM: H25.9 ICD-9-CM: 366.10 Essential hypertension     ICD-10-CM: I10 
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 
 ICD-9-CM: V58.83 Encounter for long-term (current) use of insulin ([facility]     ICD-10-CM: Z79.4 ICD-9-CM: V58.67 Encounter for screening mammogram for breast cancer     ICD-10-CM: Z12.31 
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, with long-term current use of insulin, macular edema presence unspecified ([facility]     ICD-10-CM: X98.6736, Z79.4, E11.65 ICD-9-CM: 250.52, 362.05, V58.67 Uncontrolled type 2 diabetes mellitus with complication, with long-term current use of insulin (HCC)     ICD-10-CM: E11.8, E11.65, Z79.4 ICD-9-CM: 250.82, V58.67 Vitals BP Pulse Temp Resp Height(growth percentile) Weight(growth percentile) 133/76 (BP 1 Location: Left arm, BP Patient Position: Sitting) 77 97.9 °F (36.6 °C) (Oral) 16 5' 2\" (1.575 m) 191 lb 9.6 oz (86.9 kg) LMP SpO2 BMI OB Status Smoking Status (LMP Unknown) 97% 35.04 kg/m2 Hysterectomy Never Smoker Vitals History BMI and BSA Data Body Mass Index Body Surface Area 35.04 kg/m 2 1.95 m 2 Preferred Pharmacy Pharmacy Name Phone [facility] [address] [place] [address] [phone] Your Updated Medication List  
  
   
This list is accurate as of 5/7/18 10:23 AM.  Always use your most recent med list.  
  
  
  
  
 AMITIZA 24 mcg capsule Generic drug:  lubiPROStone Take 1 cap by mouth daily  
  
 atorvastatin 80 mg tablet Commonly known as:  LIPITOR Take 1 Tab by mouth daily. New Dose BD ULTRA-FINE SHORT PEN NEEDLE 31 gauge x 5/16\" Ndle Generic drug:  Insulin Needles (Disposable) Use to give insulin daily  
  
 cyanocobalamin 1,000 mcg/mL injection Commonly known as:  VITAMIN B12  
1,000 mcg by IntraMUSCular route every thirty (30) days. ibuprofen 800 mg tablet Commonly known as:  MOTRIN  
TAKE 1 TABLET BY MOUTH TWICE DAILY AS NEEDED FOR PAIN  
  
 insulin glargine 100 unit/mL (3 mL) Inpn Commonly known as:  LANTUS,BASAGLAR  
75 units twice daily  
  
 insulin lispro 100 unit/mL kwikpen Commonly known as:  HUMALOG  
BS   Take 32 units  -200 Take 37 units  -250 Take 42 units  -300 Take 47 units  BS greater than 301 take 52 units. LINZESS 290 mcg Cap capsule Generic drug:  linaclotide Take 290 mcg by mouth Daily (before breakfast). lisinopril 20 mg tablet Commonly known as:  Marlene Fess Take 1 Tab by mouth daily. ONETOUCH ULTRA TEST strip Generic drug:  glucose blood VI test strips Use to check BS 3-4 times daily  
  
 pantoprazole 40 mg tablet Commonly known as:  PROTONIX  
TAKE 1 TABLET BY MOUTH DAILY Prescriptions Printed Refills  
 insulin lispro (HUMALOG) 100 unit/mL kwikpen 3 Sig: BS   Take 32 units -200 Take 37 units -250 Take 42 units -300 Take 47 units BS greater than 301 take 52 units. Class: Print Prescriptions Sent to Pharmacy Refills  
 lisinopril (PRINIVIL, ZESTRIL) 20 mg tablet 1 Sig: Take 1 Tab by mouth daily. Class: Normal  
 Pharmacy: [facility] [address] [place] [address] Ph #: [phone] Route: Oral  
  
We Performed the Following AMB POC GLUCOSE, QUANTITATIVE, BLOOD [09731 CPT(R)] AMB POC HEMOGLOBIN A1C [57855 CPT(R)] AMB POC URINALYSIS DIP STICK AUTO W/ MICRO [68831 CPT(R)] LIPID PANEL [55703 CPT(R)] METABOLIC PANEL, COMPREHENSIVE [52205 CPT(R)] MICROALBUMIN, UR, RAND W/ MICROALB/CREAT RATIO [id number] CPT(R)] Follow-up Instructions Return in about 3 months (around 8/7/2018). To-Do List   
 06/02/2018 Imaging:  [facility] MAMMO BI SCREENING INCL CAD Introducing [facility]! Dear Huy White: Thank you for requesting a [employer] account.   Our records indicate that you already have an active [employer] account. You can access your account anytime at [email or web address] Did you know that you can access your hospital and ER discharge instructions at any time in [employer]? You can also review all of your test results from your hospital stay or ER visit. Additional Information If you have questions, please visit the Frequently Asked Questions section of the Edu[employer] website at [email or web address] Remember, EduKart is NOT to be used for urgent needs. For medical emergencies, dial 911. Now available from your iPhone and Android! Please provide this summary of care documentation to your next provider. Your primary care clinician is listed as Fani Kerr. If you have any questions after today's visit, please call [phone].

## 2018-05-08 LAB
ALBUMIN SERPL-MCNC: 4.3 G/DL (ref 3.5–5.5)
ALBUMIN/CREAT UR: 11 MG/G CREAT (ref 0–30)
ALBUMIN/GLOB SERPL: 1.7 {RATIO} (ref 1.2–2.2)
ALP SERPL-CCNC: 149 IU/L (ref 39–117)
ALT SERPL-CCNC: 19 IU/L (ref 0–32)
AST SERPL-CCNC: 13 IU/L (ref 0–40)
BACTERIA UR CULT: NORMAL
BILIRUB SERPL-MCNC: 0.3 MG/DL (ref 0–1.2)
BUN SERPL-MCNC: 11 MG/DL (ref 6–24)
BUN/CREAT SERPL: 13 (ref 9–23)
CALCIUM SERPL-MCNC: 9.9 MG/DL (ref 8.7–10.2)
CHLORIDE SERPL-SCNC: 103 MMOL/L (ref 96–106)
CHOLEST SERPL-MCNC: 172 MG/DL (ref 100–199)
CO2 SERPL-SCNC: 27 MMOL/L (ref 18–29)
CREAT SERPL-MCNC: 0.87 MG/DL (ref 0.57–1)
CREAT UR-MCNC: 337 MG/DL
GFR SERPLBLD CREATININE-BSD FMLA CKD-EPI: 77 ML/MIN/1.73
GFR SERPLBLD CREATININE-BSD FMLA CKD-EPI: 89 ML/MIN/1.73
GLOBULIN SER CALC-MCNC: 2.5 G/DL (ref 1.5–4.5)
GLUCOSE SERPL-MCNC: 133 MG/DL (ref 65–99)
HDLC SERPL-MCNC: 63 MG/DL
INTERPRETATION, 910389: NORMAL
LDLC SERPL CALC-MCNC: 96 MG/DL (ref 0–99)
MICROALBUMIN UR-MCNC: 37.1 UG/ML
POTASSIUM SERPL-SCNC: 4.7 MMOL/L (ref 3.5–5.2)
PROT SERPL-MCNC: 6.8 G/DL (ref 6–8.5)
SODIUM SERPL-SCNC: 145 MMOL/L (ref 134–144)
TRIGL SERPL-MCNC: 67 MG/DL (ref 0–149)
VLDLC SERPL CALC-MCNC: 13 MG/DL (ref 5–40)

## 2018-05-30 ENCOUNTER — TELEPHONE (OUTPATIENT)
Dept: FAMILY MEDICINE CLINIC | Age: 53
End: 2018-05-30

## 2018-05-30 NOTE — TELEPHONE ENCOUNTER
Patient have injections in eye 6/12/2018 and needs another pre op clearance appt.       Last pre op was 5/7/2018

## 2018-06-04 ENCOUNTER — OFFICE VISIT (OUTPATIENT)
Dept: FAMILY MEDICINE CLINIC | Age: 53
End: 2018-06-04

## 2018-06-04 VITALS
HEART RATE: 82 BPM | OXYGEN SATURATION: 99 % | BODY MASS INDEX: 35.15 KG/M2 | DIASTOLIC BLOOD PRESSURE: 78 MMHG | WEIGHT: 191 LBS | TEMPERATURE: 96.7 F | SYSTOLIC BLOOD PRESSURE: 138 MMHG | HEIGHT: 62 IN | RESPIRATION RATE: 18 BRPM

## 2018-06-04 DIAGNOSIS — E10.65 UNCONTROLLED TYPE 1 DIABETES MELLITUS WITH RETINOPATHY OF BOTH EYES, MACULAR EDEMA PRESENCE UNSPECIFIED, UNSPECIFIED RETINOPATHY SEVERITY: ICD-10-CM

## 2018-06-04 DIAGNOSIS — Z01.818 PRE-OP EXAMINATION: Primary | ICD-10-CM

## 2018-06-04 DIAGNOSIS — Z51.81 ENCOUNTER FOR MEDICATION MONITORING: ICD-10-CM

## 2018-06-04 DIAGNOSIS — K59.09 CHRONIC CONSTIPATION: ICD-10-CM

## 2018-06-04 DIAGNOSIS — I10 ESSENTIAL HYPERTENSION: ICD-10-CM

## 2018-06-04 DIAGNOSIS — Z79.4 ENCOUNTER FOR LONG-TERM (CURRENT) USE OF INSULIN (HCC): ICD-10-CM

## 2018-06-04 DIAGNOSIS — E78.2 MIXED HYPERLIPIDEMIA: ICD-10-CM

## 2018-06-04 DIAGNOSIS — E10.319 UNCONTROLLED TYPE 1 DIABETES MELLITUS WITH RETINOPATHY OF BOTH EYES, MACULAR EDEMA PRESENCE UNSPECIFIED, UNSPECIFIED RETINOPATHY SEVERITY: ICD-10-CM

## 2018-06-04 RX ORDER — IBUPROFEN 800 MG/1
TABLET ORAL
Qty: 60 TAB | Refills: 1 | Status: SHIPPED | OUTPATIENT
Start: 2018-06-04 | End: 2019-10-25 | Stop reason: SDUPTHER

## 2018-06-04 NOTE — MR AVS SNAPSHOT
[address] [facility] 
 
 
 [address] [facility] [address] [address] 
[phone] Patient: Radha Fierro MRN: [id number] :1965 Visit Information Date & Time Provider Department Dept. Phone Encounter #  
 2018  3:30 PM Stevie Patricia MD [facility] [phone] [id number] Your Appointments 2018  7:45 AM  
ROUTINE CARE with Stevie Patricia MD  
[facility] [address] [facility]) Appt Note: 3m follow up  
 [address] [facility] [address] [id number] [phone] [address] [facility] [address] Upcoming Health Maintenance Date Due FOBT Q 1 YEAR AGE 50-75 2015 EYE EXAM RETINAL OR DILATED Q1 2018* Influenza Age 5 to Adult 2018 FOOT EXAM Q1 2018 HEMOGLOBIN A1C Q6M 2018 MICROALBUMIN Q1 2019 LIPID PANEL Q1 2019 BREAST CANCER SCRN MAMMOGRAM 2019 PAP AKA CERVICAL CYTOLOGY 2020 DTaP/Tdap/Td series (2 - Td) 2027 *Topic was postponed. The date shown is not the original due date. Allergies as of 2018  Review Complete On: 2018 By: Stevie Patricia MD  
  
 Severity Noted Reaction Type Reactions Percocet [Oxycodone-acetaminophen]  2013    Other (comments) AMS Current Immunizations  Reviewed on 2018 Name Date Influenza Vaccine 10/1/2017 Pneumococcal Polysaccharide (PPSV-23) 2017 Td, Adsorbed PF 2013  7:32 PM  
  
 Not reviewed this visit You Were Diagnosed With   
  
 Codes Comments Pre-op examination    -  Primary ICD-10-CM: A30.149 ICD-9-CM: V72.84 Vitals BP Pulse Temp Resp Height(growth percentile) Weight(growth percentile) 147/82 (BP 1 Location: Left arm, BP Patient Position: Sitting) 82 96.7 °F (35.9 °C) (Oral) 18 5' 2\" (1.575 m) 191 lb (86.6 kg) LMP SpO2 BMI OB Status Smoking Status (LMP Unknown) 99% 34.93 kg/m2 Hysterectomy Never Smoker BMI and BSA Data Body Mass Index Body Surface Area 34.93 kg/m 2 1.95 m 2 Preferred Pharmacy Pharmacy Name Phone [facility] [address] [address] [facility] [phone] Your Updated Medication List  
  
   
This list is accurate as of 6/4/18  4:23 PM.  Always use your most recent med list.  
  
  
  
  
 AMITIZA 24 mcg capsule Generic drug:  lubiPROStone Take 1 cap by mouth daily  
  
 atorvastatin 80 mg tablet Commonly known as:  LIPITOR Take 1 Tab by mouth daily. New Dose  
  
 cyanocobalamin 1,000 mcg/mL injection Commonly known as:  VITAMIN B12  
1,000 mcg by IntraMUSCular route every thirty (30) days. ibuprofen 800 mg tablet Commonly known as:  MOTRIN  
TAKE 1 TABLET BY MOUTH TWICE DAILY AS NEEDED FOR PAIN  
  
 insulin glargine 100 unit/mL (3 mL) Inpn Commonly known as:  LANTUS,BASAGLAR  
80 units twice daily  
  
 insulin lispro 100 unit/mL kwikpen Commonly known as:  HUMALOG  
BS   Take 32 units  -200 Take 37 units  -250 Take 42 units  -300 Take 47 units  BS greater than 301 take 52 units. LINZESS 290 mcg Cap capsule Generic drug:  linaclotide Take 290 mcg by mouth Daily (before breakfast). lisinopril 20 mg tablet Commonly known as:  Chris Littler Take 1 Tab by mouth daily. ONETOUCH ULTRA TEST strip Generic drug:  glucose blood VI test strips Use to check BS 3-4 times daily  
  
 pantoprazole 40 mg tablet Commonly known as:  PROTONIX  
TAKE 1 TABLET BY MOUTH DAILY Prescriptions Sent to Pharmacy Refills  
 ibuprofen (MOTRIN) 800 mg tablet 1 Sig: TAKE 1 TABLET BY MOUTH TWICE DAILY AS NEEDED FOR PAIN Class: Normal  
 Pharmacy: [facility] [address], [facility] Ph #: [phone] To-Do List   
 06/06/2018 10:30 AM  
  Appointment with [facility] [address] at [address] [place] [address] ([phone]) Shower or bathe using soap and water. Do not use deodorant, powder, perfumes, or lotion the day of your exam.  If your prior mammograms were not performed at [facility] 6 please bring films with you or forward prior images 2 days before your procedure. Check in at registration 15min before your appointment time unless you were instructed to do otherwise. A script is not necessary, but if you have one, please bring it on the day of the mammogram or have it faxed to the department. You are responsible for finding a method of transportation to your appointment. If you don't have transportation, please reschedule your appointment at least 24 hours in advance. [facility] [phone] [facility]  [phone] [address] [place] [address]  [phone] [facility] [phone] [address] [phone] [facility]! Dear Susan Strickland: Thank you for requesting a [email or web address] account. Our records indicate that you already have an active [email or web address] account. You can access your account anytime at [email or web address] Did you know that you can access your hospital and ER discharge instructions at any time in [email or web address]? You can also review all of your test results from your hospital stay or ER visit. Additional Information If you have questions, please visit the Frequently Asked Questions section of the [email or web address] website at [email or web address] Remember, [email or web address] is NOT to be used for urgent needs. For medical emergencies, dial 911. Now available from your iPhone and Android! Please provide this summary of care documentation to your next provider. Your primary care clinician is listed as Maddy Wan. If you have any questions after today's visit, please call [phone].

## 2018-06-04 NOTE — PROGRESS NOTES
Chief Complaint   Patient presents with    Pre-op Exam     patient is to have right eye surgery 06/12/2018       Mammogram 06/01/2017 patient next appointment 06/06/2018         1. Have you been to the ER, urgent care clinic since your last visit? Hospitalized since your last visit? No    2. Have you seen or consulted any other health care providers outside of the [address] [place] [address] since your last visit? Include any pap smears or colon screening. No     Patient denies pain at his time.

## 2018-06-04 NOTE — PROGRESS NOTES
HISTORY OF PRESENT ILLNESS  Jduy Stearns is a 46 y.o. female. HPI    Pre-op for avastin intravitreal injection left eye. Overall has been feeling well. Cardiovascular Review:  The patient has hypertension and hyperlipidemia. Diet and Lifestyle: generally follows a low fat low cholesterol diet, generally follows a low sodium diet, exercises sporadically, nonsmoker  Home BP Monitoring: is not measured at home. Pertinent ROS: taking medications as instructed, no medication side effects noted, no TIA's, no chest pain on exertion, no dyspnea on exertion, no swelling of ankles, no palpitations, no muscle aches or pain. Diabetes Mellitus:  She has diabetes mellitus type 1 dx at the age of 25. She has been on insulin since her diagnoses. She admits that her glucose control has still not been optimal.  She has retinopathy. Diabetic ROS - medication compliance: compliant most of the time, diabetic diet compliance: compliant most of the time, home glucose monitoring: is performed regularly, check BS 3 to 4 times in a day for SS humalog insulin, further diabetic ROS: no chest pain, dyspnea or TIA's, no numbness, tingling or pain in extremities. Lab review: orders written for new lab studies as appropriate; see orders. 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 9) with comorbidity ([address] E66.01       Current Outpatient Prescriptions   Medication Sig Dispense Refill    ibuprofen (MOTRIN) 800 mg tablet TAKE 1 TABLET BY MOUTH TWICE DAILY AS NEEDED FOR PAIN 60 Tab 1    lisinopril (PRINIVIL, ZESTRIL) 20 mg tablet Take 1 Tab by mouth daily. 90 Tab 1    insulin lispro (HUMALOG) 100 unit/mL kwikpen BS   Take 32 units   -200 Take 37 units   -250 Take 42 units   -300 Take 47 units   BS greater than 301 take 52 units. 1 Package 3    insulin glargine (LANTUS,BASAGLAR) 100 unit/mL (3 mL) inpn 80 units twice daily 3 mL 3    atorvastatin (LIPITOR) 80 mg tablet Take 1 Tab by mouth daily. New Dose 30 Tab 6    AMITIZA 24 mcg capsule Take 1 cap by mouth daily  3    linaclotide (LINZESS) 290 mcg cap capsule Take 290 mcg by mouth Daily (before breakfast).  pantoprazole (PROTONIX) 40 mg tablet TAKE 1 TABLET BY MOUTH DAILY 30 Tab 11    ONETOUCH ULTRA TEST strip Use to check BS 3-4 times daily      cyanocobalamin (VITAMIN B12) 1,000 mcg/mL injection 1,000 mcg by IntraMUSCular route every thirty (30) days.          Allergies   Allergen Reactions    Percocet [Oxycodone-Acetaminophen] Other (comments)     AMS         Past Medical History:   Diagnosis Date    Diabetes ([address]     Diabetic retinopathy ([address]     right eye     Gestational diabetes     Hypercholesterolemia     Hypertension     Type 1 diabetes ([address]          Past Surgical History:   Procedure Laterality Date    HX BREAST REDUCTION Bilateral     HX CATARACT REMOVAL  2018    left eye    HX  SECTION  1990    HX  SECTION  2000    HX COLONOSCOPY      HX HEENT  2016    right eye surgery- repaired blood vessel    HX HEENT  2017    left eye surgery- removal of scar tissue    HX HYSTERECTOMY           Family History   Problem Relation Age of Onset    Hypertension Mother     Diabetes Father     No Known Problems Brother     Asthma Daughter     Asthma Son     Psoriasis Son        Social History   Substance Use Topics    Smoking status: Never Smoker    Smokeless tobacco: Never Used    Alcohol use No     Lab Results   Component Value Date/Time    WBC 6.7 02/06/2018 09:24 AM    HGB 13.0 02/06/2018 09:24 AM    HCT 41.8 02/06/2018 09:24 AM    PLATELET 198 98/83/6517 09:24 AM    MCV 81 02/06/2018 09:24 AM     Lab Results   Component Value Date/Time    Cholesterol, total 172 05/07/2018 09:57 AM    HDL Cholesterol 63 05/07/2018 09:57 AM    LDL, calculated 96 05/07/2018 09:57 AM    Triglyceride 67 05/07/2018 09:57 AM    CHOL/HDL Ratio 3.1 02/25/2010 08:25 AM     Lab Results   Component Value Date/Time    TSH 0.942 05/16/2017 03:47 PM      Lab Results   Component Value Date/Time    Sodium 145 (H) 05/07/2018 09:57 AM    Potassium 4.7 05/07/2018 09:57 AM    Chloride 103 05/07/2018 09:57 AM    CO2 27 05/07/2018 09:57 AM    Anion gap 8 02/25/2010 08:25 AM    Glucose 133 (H) 05/07/2018 09:57 AM    BUN 11 05/07/2018 09:57 AM    Creatinine 0.87 05/07/2018 09:57 AM    BUN/Creatinine ratio 13 05/07/2018 09:57 AM    GFR est AA 89 05/07/2018 09:57 AM    GFR est non-AA 77 05/07/2018 09:57 AM    Calcium 9.9 05/07/2018 09:57 AM    Bilirubin, total 0.3 05/07/2018 09:57 AM    ALT (SGPT) 19 05/07/2018 09:57 AM    AST (SGOT) 13 05/07/2018 09:57 AM    Alk. phosphatase 149 (H) 05/07/2018 09:57 AM    Protein, total 6.8 05/07/2018 09:57 AM    Albumin 4.3 05/07/2018 09:57 AM    Globulin 3.3 02/25/2010 08:25 AM    A-G Ratio 1.7 05/07/2018 09:57 AM      Lab Results   Component Value Date/Time    Hemoglobin A1c 11.5 (H) 02/25/2010 08:25 AM    Hemoglobin A1c (POC) 10.3 05/07/2018 09:40 AM           Review of Systems   Constitutional: Negative for malaise/fatigue. HENT: Negative for congestion. Respiratory: Negative for cough and shortness of breath. Cardiovascular: Negative for chest pain, palpitations and leg swelling. Gastrointestinal: Negative for abdominal pain and heartburn. Genitourinary: Negative for dysuria, frequency and urgency. Musculoskeletal: Negative for back pain and joint pain. Neurological: Negative for dizziness, tingling and headaches. Endo/Heme/Allergies: Negative for environmental allergies. Psychiatric/Behavioral: Negative for depression. The patient does not have insomnia. Physical Exam   Constitutional: She appears well-developed and well-nourished. /78  Pulse 82  Temp 96.7 °F (35.9 °C) (Oral)   Resp 18  Ht 5' 2\" (1.575 m)  Wt 191 lb (86.6 kg)  LMP  (LMP Unknown)  SpO2 99%  BMI 34.93 kg/m2    HENT:   Right Ear: Tympanic membrane and ear canal normal.   Left Ear: Tympanic membrane and ear canal normal.   Nose: No mucosal edema or rhinorrhea. Mouth/Throat: Oropharynx is clear and moist and mucous membranes are normal.   Neck: Normal range of motion. Neck supple. No thyromegaly present. Cardiovascular: Normal rate and regular rhythm. No murmur heard. Pulmonary/Chest: Effort normal and breath sounds normal.   Abdominal: Soft. Bowel sounds are normal. There is no tenderness. Musculoskeletal: Normal range of motion. She exhibits no edema. Lymphadenopathy:     She has no cervical adenopathy. Skin: Skin is warm and dry. Psychiatric: She has a normal mood and affect. Nursing note and vitals reviewed. ASSESSMENT and PLAN  Diagnoses and all orders for this visit:    1. Pre-op examination//  2. Uncontrolled type 1 diabetes mellitus with retinopathy of both eyes, macular edema presence unspecified, unspecified retinopathy severity ([address]  Medially stable for surgery    3. Essential hypertension  Discussed sodium restriction, high k rich diet, maintaining ideal body weight and regular exercise program such as daily walking 30 min perday 4-5 times per week, as physiologic means to achieve blood pressure control.  Medication compliance advised. 4. Mixed hyperlipidemia  On Lipitor. Continue to monitor. Work on diet and exercise. 5. Chronic constipation  Stable     6.  Encounter for medication monitoring//  7. Encounter for long-term (current) use of insulin ([facility]      Follow-up Disposition: reviewed diet, exercise and weight control  cardiovascular risk and specific lipid/LDL goals reviewed  reviewed medications and side effects in detail  specific diabetic recommendations: low cholesterol diet, weight control and daily exercise discussed and glycohemoglobin and other lab monitoring discussed      I have discussed diagnosis listed in this note with pt and/or family. I have discussed treatment plans and options and the risk/benefit analysis of those options, including safe use of medications and possible medication side effects. Through the use of shared decision making we have agreed to the above plan. The patient has received an after-visit summary and questions were answered concerning future plans and follow up. Advise pt of any urgent changes then to proceed to the ER.

## 2018-06-06 ENCOUNTER — HOSPITAL ENCOUNTER (OUTPATIENT)
Dept: MAMMOGRAPHY | Age: 53
Discharge: HOME | End: 2018-06-06
Attending: FAMILY MEDICINE
Payer: COMMERCIAL

## 2018-06-06 DIAGNOSIS — Z12.31 ENCOUNTER FOR SCREENING MAMMOGRAM FOR BREAST CANCER: ICD-10-CM

## 2018-06-06 PROCEDURE — 77067 SCR MAMMO BI INCL CAD: CPT

## 2018-08-29 RX ORDER — INSULIN GLARGINE 100 [IU]/ML
INJECTION, SOLUTION SUBCUTANEOUS
Qty: 75 ML | Refills: 0 | Status: SHIPPED | OUTPATIENT
Start: 2018-08-29 | End: 2018-09-10

## 2018-09-10 ENCOUNTER — OFFICE VISIT (OUTPATIENT)
Dept: FAMILY MEDICINE CLINIC | Age: 53
End: 2018-09-10

## 2018-09-10 VITALS
HEART RATE: 82 BPM | BODY MASS INDEX: 33.71 KG/M2 | HEIGHT: 62 IN | OXYGEN SATURATION: 97 % | TEMPERATURE: 98.2 F | DIASTOLIC BLOOD PRESSURE: 79 MMHG | WEIGHT: 183.2 LBS | RESPIRATION RATE: 16 BRPM | SYSTOLIC BLOOD PRESSURE: 130 MMHG

## 2018-09-10 DIAGNOSIS — E10.319 UNCONTROLLED TYPE 1 DIABETES MELLITUS WITH RETINOPATHY OF BOTH EYES, MACULAR EDEMA PRESENCE UNSPECIFIED, UNSPECIFIED RETINOPATHY SEVERITY: ICD-10-CM

## 2018-09-10 DIAGNOSIS — Z51.81 ENCOUNTER FOR MEDICATION MONITORING: ICD-10-CM

## 2018-09-10 DIAGNOSIS — I10 ESSENTIAL HYPERTENSION: Primary | ICD-10-CM

## 2018-09-10 DIAGNOSIS — Z01.89 NEEDS SLEEP APNEA ASSESSMENT: ICD-10-CM

## 2018-09-10 DIAGNOSIS — E10.65 UNCONTROLLED TYPE 1 DIABETES MELLITUS WITH RETINOPATHY OF BOTH EYES, MACULAR EDEMA PRESENCE UNSPECIFIED, UNSPECIFIED RETINOPATHY SEVERITY: ICD-10-CM

## 2018-09-10 DIAGNOSIS — E78.2 MIXED HYPERLIPIDEMIA: ICD-10-CM

## 2018-09-10 DIAGNOSIS — Z79.4 ENCOUNTER FOR LONG-TERM (CURRENT) USE OF INSULIN (HCC): ICD-10-CM

## 2018-09-10 LAB
GLUCOSE POC: 73 MG/DL
HBA1C MFR BLD HPLC: 12.4 %

## 2018-09-10 RX ORDER — INSULIN LISPRO 100 [IU]/ML
INJECTION, SOLUTION INTRAVENOUS; SUBCUTANEOUS
Qty: 1 PACKAGE | Refills: 3
Start: 2018-09-10 | End: 2018-12-14 | Stop reason: SDUPTHER

## 2018-09-10 NOTE — MR AVS SNAPSHOT
[facility] 
 
 
 [address] 7 [id number] 
[phone] Patient: Christina Santana MRN: [id number] :1965 Visit Information Date & Time Provider Department Dept. Phone Encounter #  
 9/10/2018  7:45 AM Maximiliano Ferrara MD [facility] [phone] [id number] Follow-up Instructions Return in about 1 month (around 10/10/2018). Your Appointments 10/3/2018  8:15 AM  
ROUTINE CARE with Maximiliano Ferrara MD  
[facility] Appt Note: 3m follow up; R/s for 3 mth f/u, fast CP $0 ronald 18  
 [address] [place] [id number]  
[phone] [address] Upcoming Health Maintenance Date Due FOBT Q 1 YEAR AGE 50-75 2015 EYE EXAM RETINAL OR DILATED Q1 2018 Influenza Age 5 to Adult 2019* HEMOGLOBIN A1C Q6M 2018 MICROALBUMIN Q1 2019 LIPID PANEL Q1 2019 FOOT EXAM Q1 9/10/2019 BREAST CANCER SCRN MAMMOGRAM 2020 PAP AKA CERVICAL CYTOLOGY 2020 DTaP/Tdap/Td series (2 - Td) 2027 *Topic was postponed. The date shown is not the original due date. Allergies as of 9/10/2018  Review Complete On: 9/10/2018 By: Maximiliano Ferrara MD  
  
 Severity Noted Reaction Type Reactions Percocet [Oxycodone-acetaminophen]  2013    Other (comments) AMS Current Immunizations  Reviewed on 9/10/2018 Name Date Influenza Vaccine 10/1/2017 Pneumococcal Polysaccharide (PPSV-23) 2017 Td, Adsorbed PF 2013  7:32 PM  
  
 Reviewed by Maximiliano Ferrara MD on 9/10/2018 at  8:18 AM  
 Reviewed by Maximiliano Ferrara MD on 9/10/2018 at  8:19 AM  
You Were Diagnosed With   
  
 Codes Comments Essential hypertension    -  Primary ICD-10-CM: I10 
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 
ICD-9-CM: V58.83 Encounter for long-term (current) use of insulin ([facility]     ICD-10-CM: Z79.4 ICD-9-CM: V58.67 Needs sleep apnea assessment     ICD-10-CM: Z01.89 ICD-9-CM: V72.85 Vitals BP Pulse Temp Resp Height(growth percentile) Weight(growth percentile) 130/79 (BP 1 Location: Left arm, BP Patient Position: Sitting) 82 98.2 °F (36.8 °C) (Oral) 16 5' 2\" (1.575 m) 183 lb 3.2 oz (83.1 kg) LMP SpO2 BMI OB Status Smoking Status (LMP Unknown) 97% 33.51 kg/m2 Hysterectomy Never Smoker BMI and BSA Data Body Mass Index Body Surface Area  
 33.51 kg/m 2 1.91 m 2 Preferred Pharmacy Pharmacy Name Phone [facility] [address] [phone] Your Updated Medication List  
  
   
This list is accurate as of 9/10/18  9:06 AM.  Always use your most recent med list.  
  
  
  
  
 atorvastatin 80 mg tablet Commonly known as:  LIPITOR Take 1 Tab by mouth daily. New Dose  
  
 cyanocobalamin 1,000 mcg/mL injection Commonly known as:  VITAMIN B12  
1,000 mcg by IntraMUSCular route every thirty (30) days. ibuprofen 800 mg tablet Commonly known as:  MOTRIN  
TAKE 1 TABLET BY MOUTH TWICE DAILY AS NEEDED FOR PAIN  
  
 * insulin glargine 100 unit/mL (3 mL) Inpn Commonly known as:  LANTUS,BASAGLAR  
80 units twice daily * insulin glargine 300 unit/mL (3 mL) Inpn Commonly known as:  TOUJEO MAX SOLOSTAR  
75 Units by SubCUTAneous route two (2) times a day. insulin lispro 100 unit/mL kwikpen Commonly known as:  HUMALOG BS   Take 34 units  -200 Take 39 units  -250 Take 44 units  -300 Take 49 units  BS greater than 301 take 54 units. LINZESS 290 mcg Cap capsule Generic drug:  linaclotide Take 290 mcg by mouth Daily (before breakfast). lisinopril 20 mg tablet Commonly known as:  Lorraine Theodore Take 1 Tab by mouth daily. ONETOUCH ULTRA TEST strip Generic drug:  glucose blood VI test strips Use to check BS 3-4 times daily  
  
 pantoprazole 40 mg tablet Commonly known as:  PROTONIX  
TAKE 1 TABLET BY MOUTH DAILY * Notice: This list has 2 medication(s) that are the same as other medications prescribed for you. Read the directions carefully, and ask your doctor or other care provider to review them with you. Prescriptions Sent to Pharmacy Refills  
 insulin glargine (TOUJEO MAX SOLOSTAR) 300 unit/mL (3 mL) inpn 0 Si Units by SubCUTAneous route two (2) times a day. Class: Normal  
 Pharmacy: [facility] [address] [place] [address] #: [phone] Route: SubCUTAneous We Performed the Following AMB POC GLUCOSE, QUANTITATIVE, BLOOD [80191 CPT(R)] AMB POC HEMOGLOBIN A1C [17330 CPT(R)] METABOLIC PANEL, COMPREHENSIVE [91375 CPT(R)] REFERRAL TO DIABETIC EDUCATION [REF20 Custom] Comments:  
 Dietician consultation please. REFERRAL TO PULMONARY DISEASE [SHE50 Custom] Follow-up Instructions Return in about 1 month (around 10/10/2018). Referral Information Referral ID Referred By Referred To  
  
 [id number] Esther ADAMS Not Available Visits Status Start Date End Date 1 New Request 9/10/18 9/10/19 If your referral has a status of pending review or denied, additional information will be sent to support the outcome of this decision. Referral ID Referred By Referred To [id number] Dahiana Mccurdy MD  
   [address] [facility] [address] [place], [address] Phone: [phone] Fax: [phone] Visits Status Start Date End Date 1 New Request 9/10/18 9/10/19 If your referral has a status of pending review or denied, additional information will be sent to support the outcome of this decision. Introducing [facility]! Dear Sheri Hu: Thank you for requesting a [employer] account. Our records indicate that you already have an active [employer] account. You can access your account anytime at [email or web address] Did you know that you can access your hospital and ER discharge instructions at any time in [employer]? You can also review all of your test results from your hospital stay or ER visit. Additional Information If you have questions, please visit the Frequently Asked Questions section of the [employer] website at [email or web address] Remember, [employer] is NOT to be used for urgent needs. For medical emergencies, dial 911. Now available from your iPhone and Android! Please provide this summary of care documentation to your next provider. Your primary care clinician is listed as Zach Moreno. If you have any questions after today's visit, please call [phone].

## 2018-09-10 NOTE — PROGRESS NOTES
Chief Complaint Patient presents with  Hypertension  
  follow up  Diabetes  
  follow up  Cholesterol Problem  
  follow up

## 2018-09-10 NOTE — PROGRESS NOTES
HISTORY OF PRESENT ILLNESS Marcin Hua is a 46 y.o. female. HPI Follow up on chronic medical problems. Overall has been feeling well. Cardiovascular Review: 
The patient has hypertension and hyperlipidemia. Diet and Lifestyle: generally follows a low fat low cholesterol diet, generally follows a low sodium diet, exercises sporadically, nonsmoker Home BP Monitoring: is not measured at home. Pertinent ROS: taking medications as instructed, no medication side effects noted, no TIA's, no chest pain on exertion, no dyspnea on exertion, no swelling of ankles, no palpitations, no muscle aches or pain. Diabetes Mellitus: 
She has diabetes mellitus type 1 dx at the age of 25. She has been on insulin since her diagnoses. She has retinopathy. Diabetic ROS - medication compliance: compliant most of the time, diabetic diet compliance: compliant most of the time, home glucose monitoring: is performed regularly, check BS 3 to 4 times in a day for SS humalog insulin, she states that her BS are still not very good at times. Her food choices are not always good. She has not been exercising. She sleep well at night but does snore. Further diabetic ROS: no chest pain, dyspnea or TIA's, no numbness, tingling or pain in extremities. Lab review: orders written for new lab studies as appropriate; see orders. Patient Active Problem List  
Diagnosis Code  Gastroesophageal reflux disease without esophagitis K21.9  History of hysterectomy for benign disease Z90.710  
 Diabetic retinopathy ([address] E11.319  Type 1 diabetes mellitus with retinopathy of right eye without macular edema (HCC) E10.319  
 Encounter for long-term (current) use of insulin ([address] Z79.4  
 Encounter for medication monitoring Z51.81  
 Essential hypertension I10  
 Mixed hyperlipidemia E78.2  Chronic constipation K59.09  Severe obesity (BMI 35.0-39. 9) with comorbidity ([address] E66.01  
 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 3 mL 0  
 insulin lispro (HUMALOG) 100 unit/mL kwikpen BS   Take 34 units -200 Take 39 units -250 Take 44 units -300 Take 49 units BS greater than 301 take 54 units. 1 Package 3  
 flash glucose sensor (FREESTYLE JALEEL SENSOR) kit Use to check and monitor BS 1 Each 0  
 flash glucose scanning reader (FREESTYLE JALEEL READER) misc Use to moniotr BS 1 Each 0  
 ibuprofen (MOTRIN) 800 mg tablet TAKE 1 TABLET BY MOUTH TWICE DAILY AS NEEDED FOR PAIN 60 Tab 1  
 lisinopril (PRINIVIL, ZESTRIL) 20 mg tablet Take 1 Tab by mouth daily. 90 Tab 1  
 insulin glargine (LANTUS,BASAGLAR) 100 unit/mL (3 mL) inpn 80 units twice daily 3 mL 3  
 atorvastatin (LIPITOR) 80 mg tablet Take 1 Tab by mouth daily. New Dose 30 Tab 6  
 linaclotide (LINZESS) 290 mcg cap capsule Take 290 mcg by mouth Daily (before breakfast).  pantoprazole (PROTONIX) 40 mg tablet TAKE 1 TABLET BY MOUTH DAILY 30 Tab 11  
 ONETOUCH ULTRA TEST strip Use to check BS 3-4 times daily  cyanocobalamin (VITAMIN B12) 1,000 mcg/mL injection 1,000 mcg by IntraMUSCular route every thirty (30) days. Allergies Allergen Reactions  Percocet [Oxycodone-Acetaminophen] Other (comments) AMS Past Medical History:  
Diagnosis Date  Diabetes ([address]  Diabetic retinopathy ([facility] right eye  Gestational diabetes  Hypercholesterolemia  Hypertension  Type 1 diabetes ([address] Past Surgical History:  
Procedure Laterality Date  HX BREAST REDUCTION Bilateral   HX CATARACT REMOVAL  2018  
 left eye  HX  SECTION  1990  
 HX  SECTION  2000  HX COLONOSCOPY  HX HEENT  02/2016  
 right eye surgery- repaired blood vessel  HX HEENT  03/2017  
 left eye surgery- removal of scar tissue  HX HEENT  07/2018  
 left eye-laser  HX HYSTERECTOMY Family History Problem Relation Age of Onset  Hypertension Mother  Diabetes Father  No Known Problems Brother  Asthma Daughter  Asthma Son  Psoriasis Son   
 
 
Social History Substance Use Topics  Smoking status: Never Smoker  Smokeless tobacco: Never Used  Alcohol use No  
 
  
Lab Results Component Value Date/Time WBC 6.7 02/06/2018 09:24 AM  
HGB 13.0 02/06/2018 09:24 AM  
HCT 41.8 02/06/2018 09:24 AM  
PLATELET 936 37/39/2276 09:24 AM  
MCV 81 02/06/2018 09:24 AM  
 
Lab Results Component Value Date/Time Cholesterol, total 172 05/07/2018 09:57 AM  
HDL Cholesterol 63 05/07/2018 09:57 AM  
LDL, calculated 96 05/07/2018 09:57 AM  
Triglyceride 67 05/07/2018 09:57 AM  
CHOL/HDL Ratio 3.1 02/25/2010 08:25 AM  
 
Lab Results Component Value Date/Time TSH 0.942 05/16/2017 03:47 PM  
  
Lab Results Component Value Date/Time Sodium 145 (H) 05/07/2018 09:57 AM  
 Potassium 4.7 05/07/2018 09:57 AM  
 Chloride 103 05/07/2018 09:57 AM  
 CO2 27 05/07/2018 09:57 AM  
 Anion gap 8 02/25/2010 08:25 AM  
 Glucose 133 (H) 05/07/2018 09:57 AM  
 BUN 11 05/07/2018 09:57 AM  
 Creatinine 0.87 05/07/2018 09:57 AM  
 BUN/Creatinine ratio 13 05/07/2018 09:57 AM  
 GFR est AA 89 05/07/2018 09:57 AM  
 GFR est non-AA 77 05/07/2018 09:57 AM  
 Calcium 9.9 05/07/2018 09:57 AM  
 Bilirubin, total 0.3 05/07/2018 09:57 AM  
 ALT (SGPT) 19 05/07/2018 09:57 AM  
 AST (SGOT) 13 05/07/2018 09:57 AM  
 Alk. phosphatase 149 (H) 05/07/2018 09:57 AM  
 Protein, total 6.8 05/07/2018 09:57 AM  
 Albumin 4.3 05/07/2018 09:57 AM  
 Globulin 3.3 02/25/2010 08:25 AM  
 A-G Ratio 1.7 05/07/2018 09:57 AM  
  
Lab Results Component Value Date/Time  Hemoglobin A1c 11.5 (H) 02/25/2010 08:25 AM  
 Hemoglobin A1c (POC) 12.4 09/10/2018 08:47 AM  
   
Review of Systems Constitutional: Negative for malaise/fatigue. HENT: Negative for congestion. Eyes: Negative for blurred vision. Respiratory: Negative for cough and shortness of breath. Cardiovascular: Negative for chest pain, palpitations and leg swelling. Gastrointestinal: Negative for abdominal pain, constipation and heartburn. Genitourinary: Negative for dysuria, frequency and urgency. Musculoskeletal: Negative for back pain and joint pain. Neurological: Negative for dizziness, tingling and headaches. Endo/Heme/Allergies: Negative for environmental allergies. Psychiatric/Behavioral: Negative for depression. The patient does not have insomnia. Physical Exam  
Constitutional: She appears well-developed and well-nourished. /79 (BP 1 Location: Left arm, BP Patient Position: Sitting)  Pulse 82  Temp 98.2 °F (36.8 °C) (Oral)   Resp 16  Ht 5' 2\" (1.575 m)  Wt 183 lb 3.2 oz (83.1 kg)  LMP  (LMP Unknown)  SpO2 97%  BMI 33.51 kg/m2 HENT:  
Right Ear: Tympanic membrane and ear canal normal.  
Left Ear: Tympanic membrane and ear canal normal.  
Nose: No mucosal edema or rhinorrhea. Mouth/Throat: Oropharynx is clear and moist and mucous membranes are normal.  
Neck: Normal range of motion. Neck supple. No thyromegaly present. Cardiovascular: Normal rate and regular rhythm. No murmur heard. Pulmonary/Chest: Effort normal and breath sounds normal.  
Abdominal: Soft. Bowel sounds are normal. There is no tenderness. Musculoskeletal: Normal range of motion. She exhibits no edema. Lymphadenopathy:  
  She has no cervical adenopathy. Skin: Skin is warm and dry. Psychiatric: She has a normal mood and affect. Nursing note and vitals reviewed. ASSESSMENT and PLAN Diagnoses and all orders for this visit: 1. Essential hypertension Discussed sodium restriction, high k rich diet, maintaining ideal body weight and regular exercise program such as daily walking 30 min perday 4-5 times per week, as physiologic means to achieve blood pressure control.  Medication compliance advised. 2. 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 
-     AMB POC GLUCOSE, QUANTITATIVE, BLOOD 
-     REFERRAL TO DIABETIC EDUCATION 
-    Change to insulin glargine (TOUJEO MAX SOLOSTAR) 300 unit/mL (3 mL) inpn; 75 Units by SubCUTAneous route two (2) times a day. -    Increase insulin lispro (HUMALOG) 100 unit/mL kwikpen; BS   Take 34 units -200 Take 39 units -250 Take 44 units -300 Take 49 units BS greater than 301 take 54 units. -     flash glucose sensor (FREESTYLE JALEEL SENSOR) kit; Use to check and monitor BS 
-     flash glucose scanning reader (FREESTYLE JALEEL READER) misc; Use to moniotr BS 3. Mixed hyperlipidemia Continue to monitor. Work on diet and exercise. 4. Encounter for medication monitoring// 
5. Encounter for long-term (current) use of insulin ([facility] -     METABOLIC PANEL, COMPREHENSIVE 6. Needs sleep apnea assessment 
-     REFERRAL TO PULMONARY DISEASE Follow-up Disposition: 
Return in about 1 month (around 10/10/2018). reviewed diet, exercise and weight control 
cardiovascular risk and specific lipid/LDL goals reviewed 
reviewed medications and side effects in detail 
specific diabetic recommendations: referral to Diabetic Education department, home glucose monitoring emphasized, all medications, side effects and compliance discussed carefully, foot care discussed and Podiatry visits discussed, annual eye examinations at Ophthalmology discussed and glycohemoglobin and other lab monitoring discussed I have discussed diagnosis listed in this note with pt and/or family.  I have discussed treatment plans and options and the risk/benefit analysis of those options, including safe use of medications and possible medication side effects. Through the use of shared decision making we have agreed to the above plan. The patient has received an after-visit summary and questions were answered concerning future plans and follow up. Advise pt of any urgent changes then to proceed to the ER.

## 2018-09-11 LAB
ALBUMIN SERPL-MCNC: 4.4 G/DL (ref 3.5–5.5)
ALBUMIN/GLOB SERPL: 1.7 {RATIO} (ref 1.2–2.2)
ALP SERPL-CCNC: 157 IU/L (ref 39–117)
ALT SERPL-CCNC: 25 IU/L (ref 0–32)
AST SERPL-CCNC: 25 IU/L (ref 0–40)
BILIRUB SERPL-MCNC: 0.2 MG/DL (ref 0–1.2)
BUN SERPL-MCNC: 20 MG/DL (ref 6–24)
BUN/CREAT SERPL: 20 (ref 9–23)
CALCIUM SERPL-MCNC: 10 MG/DL (ref 8.7–10.2)
CHLORIDE SERPL-SCNC: 101 MMOL/L (ref 96–106)
CO2 SERPL-SCNC: 25 MMOL/L (ref 20–29)
CREAT SERPL-MCNC: 0.98 MG/DL (ref 0.57–1)
GLOBULIN SER CALC-MCNC: 2.6 G/DL (ref 1.5–4.5)
GLUCOSE SERPL-MCNC: 74 MG/DL (ref 65–99)
POTASSIUM SERPL-SCNC: 4.9 MMOL/L (ref 3.5–5.2)
PROT SERPL-MCNC: 7 G/DL (ref 6–8.5)
SODIUM SERPL-SCNC: 141 MMOL/L (ref 134–144)

## 2018-09-13 DIAGNOSIS — E10.319 UNCONTROLLED TYPE 1 DIABETES MELLITUS WITH RETINOPATHY OF BOTH EYES, MACULAR EDEMA PRESENCE UNSPECIFIED, UNSPECIFIED RETINOPATHY SEVERITY: ICD-10-CM

## 2018-09-13 DIAGNOSIS — E10.65 UNCONTROLLED TYPE 1 DIABETES MELLITUS WITH RETINOPATHY OF BOTH EYES, MACULAR EDEMA PRESENCE UNSPECIFIED, UNSPECIFIED RETINOPATHY SEVERITY: ICD-10-CM

## 2018-09-13 RX ORDER — INSULIN GLARGINE 300 U/ML
INJECTION, SOLUTION SUBCUTANEOUS
Qty: 3 UNSPECIFIED | Refills: 0 | Status: SHIPPED | OUTPATIENT
Start: 2018-09-13 | End: 2018-09-14 | Stop reason: SDUPTHER

## 2018-09-19 DIAGNOSIS — E10.319 UNCONTROLLED TYPE 1 DIABETES MELLITUS WITH RETINOPATHY OF BOTH EYES, MACULAR EDEMA PRESENCE UNSPECIFIED, UNSPECIFIED RETINOPATHY SEVERITY: ICD-10-CM

## 2018-09-19 DIAGNOSIS — E10.65 UNCONTROLLED TYPE 1 DIABETES MELLITUS WITH RETINOPATHY OF BOTH EYES, MACULAR EDEMA PRESENCE UNSPECIFIED, UNSPECIFIED RETINOPATHY SEVERITY: ICD-10-CM

## 2018-09-19 RX ORDER — FLASH GLUCOSE SENSOR
KIT MISCELLANEOUS
Qty: 1 KIT | Refills: 0 | Status: SHIPPED | OUTPATIENT
Start: 2018-09-19 | End: 2019-02-11 | Stop reason: SDUPTHER

## 2018-09-19 NOTE — PROGRESS NOTES
Documenting sample toujeo pens provided to patient by PCP / TW on 9/10/18 - 6 pens. Samples logged, documented and signed out per policy.   Francheska Sheridan, PHARMD, CDE

## 2018-09-20 ENCOUNTER — DOCUMENTATION ONLY (OUTPATIENT)
Dept: FAMILY MEDICINE CLINIC | Age: 53
End: 2018-09-20

## 2018-09-20 NOTE — PROGRESS NOTES
Received call requesting H & P form to be faxed for patient who is scheduled to have surgery now. Patient in pre op area currently. Faxed over last office note of 9/10/18 with an addended note  per Dr. Milton, faxed to [phone].

## 2018-09-28 ENCOUNTER — PATIENT MESSAGE (OUTPATIENT)
Dept: FAMILY MEDICINE CLINIC | Age: 53
End: 2018-09-28

## 2018-09-28 DIAGNOSIS — E10.319 TYPE 1 DIABETES MELLITUS WITH RETINOPATHY OF RIGHT EYE WITHOUT MACULAR EDEMA, UNSPECIFIED RETINOPATHY SEVERITY (HCC): Primary | ICD-10-CM

## 2018-10-02 NOTE — PROGRESS NOTES
HISTORY OF PRESENT ILLNESS Tona Cheek is a 46 y.o. female. HPI Pre-op for avastin intravitreal injection left eye. Cardiovascular Review: 
The patient has hypertension and hyperlipidemia. Diet and Lifestyle: generally follows a low fat low cholesterol diet, generally follows a low sodium diet, exercises sporadically, nonsmoker Home BP Monitoring: is not measured at home. Pertinent ROS: taking medications as instructed, no medication side effects noted, no TIA's, no chest pain on exertion, no dyspnea on exertion, no swelling of ankles, no palpitations, no muscle aches or pain. Diabetes Mellitus: 
She has diabetes mellitus type 1 dx at the age of 25. She has been on insulin since her diagnoses. She has retinopathy. Diabetic ROS - medication compliance: compliant most of the time, diabetic diet compliance: compliant most of the time, home glucose monitoring: is performed regularly, check BS 3 to 4 times in a day for SS humalog insulin, she states that her BS are still not very good at times. Her food choices are not always good. She is meeting with the dietician at the end of this month. She has not been exercising. She sleep well at night but does snore. Further diabetic ROS: no chest pain, dyspnea or TIA's, no numbness, tingling or pain in extremities. Lab review: orders written for new lab studies as appropriate; see orders. Patient Active Problem List  
Diagnosis Code  Gastroesophageal reflux disease without esophagitis K21.9  History of hysterectomy for benign disease Z90.710  
 Diabetic retinopathy ([address] E11.319  Type 1 diabetes mellitus with retinopathy of right eye without macular edema (HCC) E10.319  
 Encounter for long-term (current) use of insulin ([address] Z79.4  
 Encounter for medication monitoring Z51.81  
 Essential hypertension I10  
 Mixed hyperlipidemia E78.2  Chronic constipation K59.09  
  Severe obesity (BMI 35.0-39. 9) with comorbidity ([facility] E66.01  
 Uncontrolled type 1 diabetes mellitus with retinopathy of both eyes ([facility] E10.319, E10.65 Current Outpatient Prescriptions Medication Sig Dispense Refill  flash glucose scanning reader (FREESTYLE JALEEL 10 DAY READER) misc Use to moniotr BS 3 Each 3  
 FREESTYLE JALEEL SENSOR kit USE TO CHECK AND MONITOR BLOOD SUGAR 1 Kit 0  
 insulin glargine (TOUJEO SOLOSTAR U-300 INSULIN) 300 unit/mL (1.5 mL) inpn Inject 75 units twice daily as directed. 6 Pen 0  
 TOUJEO MAX SOLOSTAR 300 unit/mL (3 mL) inpn INJECT 75 UNITS UNDER THE SKIN TWICE DAILY 3 UNSPECIFIED 3  
 insulin lispro (HUMALOG) 100 unit/mL kwikpen BS   Take 34 units -200 Take 39 units -250 Take 44 units -300 Take 49 units BS greater than 301 take 54 units. 1 Package 3  ibuprofen (MOTRIN) 800 mg tablet TAKE 1 TABLET BY MOUTH TWICE DAILY AS NEEDED FOR PAIN 60 Tab 1  
 lisinopril (PRINIVIL, ZESTRIL) 20 mg tablet Take 1 Tab by mouth daily. 90 Tab 1  
 insulin glargine (LANTUS,BASAGLAR) 100 unit/mL (3 mL) inpn 80 units twice daily 3 mL 3  
 atorvastatin (LIPITOR) 80 mg tablet Take 1 Tab by mouth daily. New Dose 30 Tab 6  
 linaclotide (LINZESS) 290 mcg cap capsule Take 290 mcg by mouth Daily (before breakfast).  pantoprazole (PROTONIX) 40 mg tablet TAKE 1 TABLET BY MOUTH DAILY 30 Tab 11  
 ONETOUCH ULTRA TEST strip Use to check BS 3-4 times daily  cyanocobalamin (VITAMIN B12) 1,000 mcg/mL injection 1,000 mcg by IntraMUSCular route every thirty (30) days. Allergies Allergen Reactions  Percocet [Oxycodone-Acetaminophen] Other (comments) AMS Past Medical History:  
Diagnosis Date  Diabetes ([facility]  Diabetic retinopathy ([facility] right eye  Gestational diabetes  Hypercholesterolemia  Hypertension  Type 1 diabetes ([facility] Past Surgical History:  
Procedure Laterality Date  HX BREAST REDUCTION Bilateral 1984  HX CATARACT REMOVAL  2018  
 left eye  HX  SECTION  1990  
 HX  SECTION  2000  HX COLONOSCOPY    
 HX HEENT  2016  
 right eye surgery- repaired blood vessel  HX HEENT  2017  
 left eye surgery- removal of scar tissue  HX HEENT  2018  
 left eye-laser  HX HYSTERECTOMY Family History Problem Relation Age of Onset  Hypertension Mother  Diabetes Father  No Known Problems Brother  Asthma Daughter  Asthma Son  Psoriasis Son   
 
 
Social History Substance Use Topics  Smoking status: Never Smoker  Smokeless tobacco: Never Used  Alcohol use No  
 
  
Lab Results Component Value Date/Time WBC 6.7 2018 09:24 AM  
HGB 13.0 2018 09:24 AM  
HCT 41.8 2018 09:24 AM  
PLATELET 259  09:24 AM  
MCV 81 2018 09:24 AM  
 
Lab Results Component Value Date/Time Cholesterol, total 172 2018 09:57 AM  
HDL Cholesterol 63 2018 09:57 AM  
LDL, calculated 96 2018 09:57 AM  
Triglyceride 67 2018 09:57 AM  
CHOL/HDL Ratio 3.1 2010 08:25 AM  
 
Lab Results Component Value Date/Time TSH 0.942 2017 03:47 PM  
  
Lab Results Component Value Date/Time Sodium 141 09/10/2018 08:47 AM  
 Potassium 4.9 09/10/2018 08:47 AM  
 Chloride 101 09/10/2018 08:47 AM  
 CO2 25 09/10/2018 08:47 AM  
 Anion gap 8 2010 08:25 AM  
 Glucose 74 09/10/2018 08:47 AM  
 BUN 20 09/10/2018 08:47 AM  
 Creatinine 0.98 09/10/2018 08:47 AM  
 BUN/Creatinine ratio 20 09/10/2018 08:47 AM  
 GFR est AA 77 09/10/2018 08:47 AM  
 GFR est non-AA 67 09/10/2018 08:47 AM  
 Calcium 10.0 09/10/2018 08:47 AM  
 Bilirubin, total 0.2 09/10/2018 08:47 AM  
 ALT (SGPT) 25 09/10/2018 08:47 AM  
 AST (SGOT) 25 09/10/2018 08:47 AM  
 Alk.  phosphatase 157 (H) 09/10/2018 08:47 AM  
 Protein, total 7.0 09/10/2018 08:47 AM  
 Albumin 4.4 09/10/2018 08:47 AM  
 Globulin 3.3 02/25/2010 08:25 AM  
 A-G Ratio 1.7 09/10/2018 08:47 AM  
  
Lab Results Component Value Date/Time Hemoglobin A1c 11.5 (H) 02/25/2010 08:25 AM  
 Hemoglobin A1c (POC) 12.4 09/10/2018 08:47 AM  
   
Review of Systems Constitutional: Negative for malaise/fatigue. HENT: Negative for congestion. Respiratory: Negative for cough and shortness of breath. Cardiovascular: Negative for chest pain, palpitations and leg swelling. Gastrointestinal: Negative for abdominal pain, constipation and heartburn. Genitourinary: Negative for dysuria, frequency and urgency. Musculoskeletal: Negative for back pain and joint pain. Neurological: Negative for dizziness, tingling and headaches. Endo/Heme/Allergies: Negative for environmental allergies. Psychiatric/Behavioral: Negative for depression. The patient does not have insomnia. Physical Exam  
Constitutional: She appears well-developed and well-nourished. /80  Pulse 85  Temp 98.3 °F (36.8 °C) (Oral)   Resp 18  Ht 5' 2\" (1.575 m)  Wt 187 lb (84.8 kg)  LMP  (LMP Unknown)  SpO2 96%  BMI 34.2 kg/m2 HENT:  
Right Ear: Tympanic membrane and ear canal normal.  
Left Ear: Tympanic membrane and ear canal normal.  
Nose: No mucosal edema or rhinorrhea. Mouth/Throat: Oropharynx is clear and moist and mucous membranes are normal.  
Neck: Normal range of motion. Neck supple. No thyromegaly present. Cardiovascular: Normal rate, regular rhythm and normal heart sounds. Pulmonary/Chest: Effort normal and breath sounds normal.  
Abdominal: Soft. Normal appearance and bowel sounds are normal. She exhibits no mass. There is no tenderness. Musculoskeletal: Normal range of motion. She exhibits no edema. Lymphadenopathy:  
  She has no cervical adenopathy. Skin: Skin is warm and dry. Psychiatric: She has a normal mood and affect. Nursing note and vitals reviewed.  
 
 
ASSESSMENT and PLAN 
 Diagnoses and all orders for this visit: 1. Pre-op examination Medically stable for eye surgery. 2. Type 1 diabetes mellitus with retinopathy of right eye without macular edema, unspecified retinopathy severity ([address] -     AMB POC GLUCOSE, QUANTITATIVE, BLOOD Follow up with dietician to help with her diet choices. Increase exercises. Will not change any of her medication regimen at this time. 3. Essential hypertension Discussed sodium restriction, high k rich diet, maintaining ideal body weight and regular exercise program such as daily walking 30 min perday 4-5 times per week, as physiologic means to achieve blood pressure control.  Medication compliance advised. 4. Mixed hyperlipidemia -     LIPID PANEL 5. Encounter for long-term (current) use of insulin (HCC)// 
6. Encounter for medication monitoring 7. Encounter for immunization -     Influenza virus vaccine (QUADRIVALENT PRES FREE SYRINGE) IM (03564) 8. Non morbid obesity Counseled on goal for exercise of eventual goal of 30-60 minutes 5-7 times a week as per AHA guidelines or just doing exercise in small increment during the day. Decrease carbohydrates (white foods, sweet foods, sweet drinks and alcohol), increase green leafy vegetables and protein (lean meats and beans). Avoid fried foods. Increase water intake. Eat 3-5 small meals daily. Increase physical activity. Follow-up Disposition: 
Return in about 2 months (around 12/10/2018). reviewed diet, exercise and weight control 
cardiovascular risk and specific lipid/LDL goals reviewed 
reviewed medications and side effects in detail 
specific diabetic recommendations: low cholesterol diet, weight control and daily exercise discussed, home glucose monitoring emphasized, foot care discussed and Podiatry visits discussed, annual eye examinations at Ophthalmology discussed and glycohemoglobin and other lab monitoring discussed I have discussed diagnosis listed in this note with pt and/or family. I have discussed treatment plans and options and the risk/benefit analysis of those options, including safe use of medications and possible medication side effects. Through the use of shared decision making we have agreed to the above plan. The patient has received an after-visit summary and questions were answered concerning future plans and follow up. Advise pt of any urgent changes then to proceed to the ER.

## 2018-10-03 ENCOUNTER — OFFICE VISIT (OUTPATIENT)
Dept: FAMILY MEDICINE CLINIC | Age: 53
End: 2018-10-03

## 2018-10-03 VITALS
OXYGEN SATURATION: 96 % | SYSTOLIC BLOOD PRESSURE: 136 MMHG | RESPIRATION RATE: 18 BRPM | HEIGHT: 62 IN | DIASTOLIC BLOOD PRESSURE: 80 MMHG | TEMPERATURE: 98.3 F | HEART RATE: 85 BPM | BODY MASS INDEX: 34.41 KG/M2 | WEIGHT: 187 LBS

## 2018-10-03 DIAGNOSIS — I10 ESSENTIAL HYPERTENSION: ICD-10-CM

## 2018-10-03 DIAGNOSIS — E10.319 TYPE 1 DIABETES MELLITUS WITH RETINOPATHY OF RIGHT EYE WITHOUT MACULAR EDEMA, UNSPECIFIED RETINOPATHY SEVERITY (HCC): ICD-10-CM

## 2018-10-03 DIAGNOSIS — Z01.818 PRE-OP EXAMINATION: Primary | ICD-10-CM

## 2018-10-03 DIAGNOSIS — E78.2 MIXED HYPERLIPIDEMIA: ICD-10-CM

## 2018-10-03 DIAGNOSIS — E66.9 NON MORBID OBESITY: ICD-10-CM

## 2018-10-03 DIAGNOSIS — Z23 ENCOUNTER FOR IMMUNIZATION: ICD-10-CM

## 2018-10-03 DIAGNOSIS — Z51.81 ENCOUNTER FOR MEDICATION MONITORING: ICD-10-CM

## 2018-10-03 DIAGNOSIS — Z79.4 ENCOUNTER FOR LONG-TERM (CURRENT) USE OF INSULIN (HCC): ICD-10-CM

## 2018-10-03 LAB — GLUCOSE POC: 179 MG/DL

## 2018-10-03 NOTE — PROGRESS NOTES
Health Maintenance Due Topic Date Due  Shingrix Vaccine Age 50> (1 of 2) 12/05/2015  FOBT Q 1 YEAR AGE 50-75  12/05/2015  
 EYE EXAM RETINAL OR DILATED Q1  06/06/2018 Chief Complaint Patient presents with  Hypertension  
  follow up  Diabetes  
  follow up  Cholesterol Problem  
  follow up 1. Have you been to the ER, urgent care clinic since your last visit? Hospitalized since your last visit? No 
 
2. Have you seen or consulted any other health care providers outside of the [address] [place] [address] since your last visit? Include any pap smears or colon screening. No 
 
3) Do you have an Advance Directive on file? no 
 
4) Are you interested in receiving information on Advance Directives? NO Patient is accompanied by self I have received verbal consent from Imer Juarez to discuss any/all medical information while they are present in the room.

## 2018-10-03 NOTE — MR AVS SNAPSHOT
[facility] 
 
 
 [address] [facility] [place] [address] [address] 
[phone] Patient: Tona Cheek MRN: [id number] :1965 Visit Information Date & Time Provider Department Dept. Phone Encounter #  
 10/3/2018  8:15 AM Eve Phelps MD [place] [facility] [phone] [id number] Follow-up Instructions Return in about 2 months (around 12/10/2018). Upcoming Health Maintenance Date Due FOBT Q 1 YEAR AGE 50-75 2015 EYE EXAM RETINAL OR DILATED Q1 2018 Influenza Age 5 to Adult 2019* Shingrix Vaccine Age 50> (1 of 2) 10/3/2019* HEMOGLOBIN A1C Q6M 3/10/2019 MICROALBUMIN Q1 2019 LIPID PANEL Q1 2019 FOOT EXAM Q1 9/10/2019 BREAST CANCER SCRN MAMMOGRAM 2020 PAP AKA CERVICAL CYTOLOGY 2020 DTaP/Tdap/Td series (2 - Td) 2027 *Topic was postponed. The date shown is not the original due date. Allergies as of 10/3/2018  Review Complete On: 10/3/2018 By: Eve Phelps MD  
  
 Severity Noted Reaction Type Reactions Percocet [Oxycodone-acetaminophen]  2013    Other (comments) AMS Current Immunizations  Reviewed on 10/3/2018 Name Date Influenza Vaccine 10/3/2018, 10/1/2017 Influenza Vaccine (Quad) PF 10/3/2018 Pneumococcal Polysaccharide (PPSV-23) 2017 Td, Adsorbed PF 2013  7:32 PM  
  
 Reviewed by Eve Phelps MD on 10/3/2018 at  8:39 AM  
You Were Diagnosed With   
  
 Codes Comments Pre-op examination    -  Primary ICD-10-CM: H91.605 ICD-9-CM: V72.84 Type 1 diabetes mellitus with retinopathy of right eye without macular edema, unspecified retinopathy severity ([facility]     ICD-10-CM: E10.319 ICD-9-CM: 250.51, 362.01 Essential hypertension     ICD-10-CM: I10 
ICD-9-CM: 401.9 Mixed hyperlipidemia     ICD-10-CM: E78.2 ICD-9-CM: 272.2 Encounter for long-term (current) use of insulin ([facility]     ICD-10-CM: Z79.4 ICD-9-CM: V58.67 Encounter for medication monitoring     ICD-10-CM: Z51.81 
ICD-9-CM: V58.83 Encounter for immunization     ICD-10-CM: V21 ICD-9-CM: V03.89 Non morbid obesity     ICD-10-CM: E66.9 ICD-9-CM: 278.00 Vitals BP Pulse Temp Resp Height(growth percentile) Weight(growth percentile) 136/80 85 98.3 °F (36.8 °C) (Oral) 18 5' 2\" (1.575 m) 187 lb (84.8 kg) LMP SpO2 BMI OB Status Smoking Status (LMP Unknown) 96% 34.2 kg/m2 Hysterectomy Never Smoker Vitals History BMI and BSA Data Body Mass Index Body Surface Area  
 34.2 kg/m 2 1.93 m 2 Preferred Pharmacy Pharmacy Name Phone [facility] [address] [place] [address] [phone] Your Updated Medication List  
  
   
This list is accurate as of 10/3/18  9:00 AM.  Always use your most recent med list.  
  
  
  
  
 atorvastatin 80 mg tablet Commonly known as:  LIPITOR Take 1 Tab by mouth daily. New Dose  
  
 cyanocobalamin 1,000 mcg/mL injection Commonly known as:  VITAMIN B12  
1,000 mcg by IntraMUSCular route every thirty (30) days. flash glucose scanning reader Misc Commonly known as:  FREESTYLE JALEEL 10 DAY READER Use to moniotr BS  
  
 FREESTYLE JALEEL 10 DAY SENSOR Kit Generic drug:  flash glucose sensor USE TO CHECK AND MONITOR BLOOD SUGAR  
  
 ibuprofen 800 mg tablet Commonly known as:  MOTRIN  
TAKE 1 TABLET BY MOUTH TWICE DAILY AS NEEDED FOR PAIN  
  
 * insulin glargine 100 unit/mL (3 mL) Inpn Commonly known as:  LANTUS,BASAGLAR  
80 units twice daily * TOUJEO MAX U-300 SOLOSTAR 300 unit/mL (3 mL) Inpn Generic drug:  insulin glargine U-300 conc INJECT 75 UNITS UNDER THE SKIN TWICE DAILY  
  
 * insulin glargine U-300 conc 300 unit/mL (1.5 mL) Inpn Commonly known as:  TOUJEO SOLOSTAR U-300 INSULIN  
 Inject 75 units twice daily as directed. insulin lispro 100 unit/mL kwikpen Commonly known as:  HUMALOG  
BS   Take 34 units  -200 Take 39 units  -250 Take 44 units  -300 Take 49 units  BS greater than 301 take 54 units. LINZESS 290 mcg Cap capsule Generic drug:  linaclotide Take 290 mcg by mouth Daily (before breakfast). lisinopril 20 mg tablet Commonly known as:  Theador Closs Take 1 Tab by mouth daily. ONETOUCH ULTRA TEST strip Generic drug:  glucose blood VI test strips Use to check BS 3-4 times daily  
  
 pantoprazole 40 mg tablet Commonly known as:  PROTONIX  
TAKE 1 TABLET BY MOUTH DAILY * Notice: This list has 3 medication(s) that are the same as other medications prescribed for you. Read the directions carefully, and ask your doctor or other care provider to review them with you. We Performed the Following AMB POC GLUCOSE, QUANTITATIVE, BLOOD [75742 CPT(R)] INFLUENZA VIRUS VAC QUAD,SPLIT,PRESV FREE SYRINGE IM [id number] CPT(R)] LIPID PANEL [71663 CPT(R)] Follow-up Instructions Return in about 2 months (around 12/10/2018). To-Do List   
 10/25/2018 8:30 AM  
  Appointment with IOANA Rosales at [facility] ([phone]) [facility]! Dear Westley Caballero: Thank you for requesting a [employer] account. Our records indicate that you already have an active [employer] account. You can access your account anytime at [email or web address] Did you know that you can access your hospital and ER discharge instructions at any time in [employer]? You can also review all of your test results from your hospital stay or ER visit. Additional Information If you have questions, please visit the Frequently Asked Questions section of the [employer] website at [email or web address] Remember, MyChart is NOT to be used for urgent needs. For medical emergencies, dial 911. Now available from your iPhone and Android! Please provide this summary of care documentation to your next provider. Your primary care clinician is listed as Alisson Hickman. If you have any questions after today's visit, please call [phone].

## 2018-10-04 LAB
CHOLEST SERPL-MCNC: 241 MG/DL (ref 100–199)
HDLC SERPL-MCNC: 69 MG/DL
INTERPRETATION, 910389: NORMAL
LDLC SERPL CALC-MCNC: 158 MG/DL (ref 0–99)
TRIGL SERPL-MCNC: 71 MG/DL (ref 0–149)
VLDLC SERPL CALC-MCNC: 14 MG/DL (ref 5–40)

## 2018-10-04 RX ORDER — EZETIMIBE 10 MG/1
10 TABLET ORAL DAILY
Qty: 30 TAB | Refills: 6 | Status: SHIPPED | OUTPATIENT
Start: 2018-10-04 | End: 2019-06-16 | Stop reason: SDUPTHER

## 2018-10-30 RX ORDER — IBUPROFEN 800 MG/1
TABLET ORAL
Qty: 60 TAB | Refills: 0 | Status: SHIPPED | OUTPATIENT
Start: 2018-10-30 | End: 2018-12-01 | Stop reason: SDUPTHER

## 2018-11-08 ENCOUNTER — HOSPITAL ENCOUNTER (OUTPATIENT)
Dept: DIABETES SERVICES | Age: 53
Discharge: HOME | End: 2018-11-08
Attending: FAMILY MEDICINE
Payer: COMMERCIAL

## 2018-11-08 PROCEDURE — 97802 MEDICAL NUTRITION INDIV IN: CPT

## 2018-11-09 NOTE — DIABETES MGMT
Diabetes Treatment  Center - Nutrition Evaluation       DATE: 18    REFERRING PHYSICIAN: Derick Mccullough  NAME: Imer Juarez : 1965 AGE: 46 y.o. GENDER: female  REASON FOR VISIT: nutrition consult for meal planning. ASSESSMENT:  Past Medical Hx: Type 1 DM >25 years monitoring using Freestyle Soo; HTN and hyperlipidemia. LABS:   Hemoglobin A1c (ambulatory values) 12.4%  9/10/18    Lab Results   Component Value Date/Time    Creatinine 0.98 09/10/2018 08:47 AM     CrCl cannot be calculated (Unknown ideal weight.). Lab Results   Component Value Date/Time    Cholesterol, total 241 (H) 10/03/2018 08:51 AM    HDL Cholesterol 69 10/03/2018 08:51 AM    LDL, calculated 158 (H) 10/03/2018 08:51 AM    Triglyceride 71 10/03/2018 08:51 AM    CHOL/HDL Ratio 3.1 2010 08:25 AM       MEDICATIONS/SUPPLEMENTS:     Prior to Admission medications    Medication Sig Start Date End Date Taking? Authorizing Provider   ibuprofen (MOTRIN) 800 mg tablet TAKE 1 TABLET BY MOUTH TWICE DAILY AS NEEDED FOR PAIN 10/30/18   Angela Rodriguez MD   LANTUS SOLOSTAR U-100 INSULIN 100 unit/mL (3 mL) inpn ADMINISTER 75 UNITS UNDER THE SKIN TWICE DAILY 10/16/18   Angela Rodriguez MD   ezetimibe (ZETIA) 10 mg tablet Take 1 Tab by mouth daily. Indications: hypercholesterolemia 10/4/18   Alejandro Green MD   flash glucose scanning reader (FREESTYLE SOO 10 DAY READER) misc Use to moniotr BS 10/2/18   Angela Rodriguez MD   FREESTYLE SOO SENSOR kit USE TO CHECK AND MONITOR BLOOD SUGAR 18   Alejandro ADAMS MD   insulin glargine (TOUJEO SOLOSTAR U-300 INSULIN) 300 unit/mL (1.5 mL) inpn Inject 75 units twice daily as directed.  18   Angela Rodriguez MD   TOUJEO MAX SOLOSTAR 300 unit/mL (3 mL) inpn INJECT 75 UNITS UNDER THE SKIN TWICE DAILY 18   Alejandro Green MD   insulin lispro (HUMALOG) 100 unit/mL kwikpen BS   Take 34 units   -200 Take 39 units   -250 Take 44 units   -300 Take 49 units   BS greater than 301 take 54 units. 9/10/18   Mukesh Rodriguez MD   ibuprofen (MOTRIN) 800 mg tablet TAKE 1 TABLET BY MOUTH TWICE DAILY AS NEEDED FOR PAIN 6/4/18   Mekhi Blum MD   lisinopril (PRINIVIL, ZESTRIL) 20 mg tablet Take 1 Tab by mouth daily. 5/7/18   Mekhi Blum MD   insulin glargine (LANTUS,BASAGLAR) 100 unit/mL (3 mL) inpn 80 units twice daily 5/7/18   Mekhi Blum MD   atorvastatin (LIPITOR) 80 mg tablet Take 1 Tab by mouth daily. New Dose 3/29/18   Mukesh Rodriguez MD   linaclotide [place]) 290 mcg cap capsule Take 290 mcg by mouth Daily (before breakfast). Provider, Historical   pantoprazole (PROTONIX) 40 mg tablet TAKE 1 TABLET BY MOUTH DAILY 1/1/18   Mukesh Rodriguez MD   [facility] ULTRA TEST strip Use to check BS 3-4 times daily 11/18/16   Provider, Historical   cyanocobalamin (VITAMIN B12) 1,000 mcg/mL injection 1,000 mcg by IntraMUSCular route every thirty (30) days. Provider, Historical       FOOD ALLERGIES/INTOLERANCES: none    ANTHROPOMETRICS:    Ht Readings from Last 1 Encounters:   10/03/18 5' 2\" (1.575 m)      Wt Readings from Last 1 Encounters:   10/03/18 84.8 kg (187 lb)      IBW: 110 # +/- 10%    BMI: 36.14       Reported Wt Hx:  Pt shared that her wt continues to increase. She shared that she is really not very hungry most days but feels like she has to eat due to insulin. Estimated Nutritional Needs:   RMR  1461.4 kcal x Activity Factor (1.1 for Sedentary) = 1608 for weight maintenance. Reported Diet Hx:  Pt shared that she has very careful with her intakes and trying to eliminate most sources of carbohydrate in her diet - may consume ~ 80 g carbohydrate daily at this time based on her current intakes. She may eat fruit in the am with breakfast but only if she does not consume the below noted options.   She does not consume yogurt, milk or cheese. She is always baking her entrees and using lean protein sources (pork/turkey/fish or steamed seafood). 24 Hour Diet Recall  Breakfast   PB crackers; omlette with tomato/cheese/onion or Fruit   Lunch   leftovers   Dinner   Spaghetti or [place] made with whole wheat pasta and ground turkey; Entree with corn/bake beans ~ 1/2 to 2/3 cup; rice 2/3 cup max and non starchy veg. Snacks  Non reported   Beverages  Coffee black and water     Exercise/Physical Actvity: none    Environmental/Social: She reports life with home renovations and work is very stressful. No time for activity and her fatigue is not helping. She was provided with chair yoga and encouraged to consider adding smaller activity amounts throughout her day (small walk at lunch and maybe arrive early to son's basketball practice to walk before heading home). She shared that 1-3 times a week she is experiencing hypoglycemia and this also impacts her daily routines and energy levels. Values can be as low as 40/65 fasting - discussed impact on her weight/DM control and her insulin needs. Sean Sotelo is very discouraged by her DM control. She shared that 40/65 - 120 mg/dl fasting; prior to lunch 140-150 mg/dl and prior to dinner 200\"s mg/dl. We identified areas of lipohypertrophy w/in her usual injection sites - could be contributing to poor absorption. Reviewed site selection and rotation. NUTRITION DIAGNOSIS:  Hyperglycemia consistent with sedentary activity level and inconsistent carbohydrate intake supported by her A1c. NUTRITION INTERVENTION:  1) try to build meals to consistently 30 g carbohydrate. 2) continue with whole grains - adding high fiber starchy vegetables and fruit (lower kcal) to meet CHO needs. 3) use carb counting resources to estimate intakes and measure food portions. 4) continue with lean protein and low fat cooking practices.    5) consider adding small amounts of activity to assist with post prandial BG control and insulin resistance. Pt is due to return 11/30/18 for review of above and assess her food diaries. PATIENT GOALS: to complete 3 days of food logs and use data from her Freestyle Soo to log her pre and 2 hour post meal BG. Specific tips and techniques to facilitate compliance with above recommendations were provided and discussed. Pt was strongly encourage to begin making necessary changes now, and re-visit the dietitian prn. If you have any questions please feel free to contact me at [phone].     Karen Rayo RD, CDE

## 2018-12-14 ENCOUNTER — OFFICE VISIT (OUTPATIENT)
Dept: FAMILY MEDICINE CLINIC | Age: 53
End: 2018-12-14

## 2018-12-14 VITALS
HEIGHT: 62 IN | OXYGEN SATURATION: 97 % | DIASTOLIC BLOOD PRESSURE: 74 MMHG | SYSTOLIC BLOOD PRESSURE: 135 MMHG | HEART RATE: 88 BPM | WEIGHT: 191.4 LBS | BODY MASS INDEX: 35.22 KG/M2 | RESPIRATION RATE: 16 BRPM | TEMPERATURE: 98.7 F

## 2018-12-14 DIAGNOSIS — Z79.4 ENCOUNTER FOR LONG-TERM (CURRENT) USE OF INSULIN (HCC): ICD-10-CM

## 2018-12-14 DIAGNOSIS — E78.2 MIXED HYPERLIPIDEMIA: ICD-10-CM

## 2018-12-14 DIAGNOSIS — I10 ESSENTIAL HYPERTENSION: ICD-10-CM

## 2018-12-14 DIAGNOSIS — E66.9 NON MORBID OBESITY: ICD-10-CM

## 2018-12-14 DIAGNOSIS — Z51.81 ENCOUNTER FOR MEDICATION MONITORING: ICD-10-CM

## 2018-12-14 DIAGNOSIS — Z01.818 PRE-OP EXAMINATION: Primary | ICD-10-CM

## 2018-12-14 DIAGNOSIS — H35.00 RETINOPATHY: ICD-10-CM

## 2018-12-14 LAB
GLUCOSE POC: 396 MG/DL
HBA1C MFR BLD HPLC: 11.1 %

## 2018-12-14 RX ORDER — TOPIRAMATE 50 MG/1
50 TABLET, FILM COATED ORAL 2 TIMES DAILY
Qty: 60 TAB | Refills: 3 | Status: SHIPPED | OUTPATIENT
Start: 2018-12-14 | End: 2019-05-15 | Stop reason: SDUPTHER

## 2018-12-14 RX ORDER — INSULIN LISPRO 100 [IU]/ML
INJECTION, SOLUTION INTRAVENOUS; SUBCUTANEOUS
Qty: 1 PACKAGE | Refills: 3
Start: 2018-12-14 | End: 2018-12-14

## 2018-12-14 RX ORDER — INSULIN LISPRO 100 [IU]/ML
INJECTION, SOLUTION INTRAVENOUS; SUBCUTANEOUS
Qty: 1 PACKAGE | Refills: 3
Start: 2018-12-14 | End: 2019-02-11 | Stop reason: SDUPTHER

## 2018-12-14 RX ORDER — PEN NEEDLE, DIABETIC 31 GX3/16"
NEEDLE, DISPOSABLE MISCELLANEOUS
Qty: 100 PEN NEEDLE | Refills: 11 | Status: SHIPPED | OUTPATIENT
Start: 2018-12-14 | End: 2021-10-25

## 2018-12-14 RX ORDER — INSULIN LISPRO 100 [IU]/ML
INJECTION, SOLUTION INTRAVENOUS; SUBCUTANEOUS
Qty: 1 PACKAGE | Refills: 3
Start: 2018-12-14 | End: 2018-12-14 | Stop reason: SDUPTHER

## 2018-12-14 NOTE — PROGRESS NOTES
HISTORY OF PRESENT ILLNESS  Josselyn Bailey is a 48 y.o. female. Pre-op for avastin intravitreal injection left eye. Cardiovascular Review:  The patient has hypertension and hyperlipidemia. Diet and Lifestyle: generally follows a low fat low cholesterol diet, generally follows a low sodium diet, exercises sporadically, nonsmoker  Home BP Monitoring: is not measured at home. Pertinent ROS: taking medications as instructed, no medication side effects noted, no TIA's, no chest pain on exertion, no dyspnea on exertion, no swelling of ankles, no palpitations, no muscle aches or pain. Diabetes Mellitus:  She has diabetes mellitus type 1 dx at the age of 25. She has been on insulin since her diagnoses. She has retinopathy. follow by endo. Diabetic ROS - medication compliance: compliant most of the time, diabetic diet compliance: compliant most of the time, home glucose monitoring: is performed regularly, check BS 3 to 4 times in a day for SS humalog insulin, she states that her BS are still not very good at times. Her food choices are not always good. She is meeting with the dietician at the end of this month. She has not been exercising. She sleep well at night but does snore. Further diabetic ROS: no chest pain, dyspnea or TIA's, no numbness, tingling or pain in extremities. Lab review: orders written for new lab studies as appropriate; see orders. Patient Active Problem List   Diagnosis Code    Gastroesophageal reflux disease without esophagitis K21.9    History of hysterectomy for benign disease Z90.710    Diabetic retinopathy ([address] E11.319    Type 1 diabetes mellitus with retinopathy of right eye without macular edema ([address] E10.319    Encounter for long-term (current) use of insulin ([address] Z79.4    Encounter for medication monitoring Z51.81    Essential hypertension I10    Mixed hyperlipidemia E78.2    Chronic constipation K59.09    Severe obesity (BMI 35.0-39. 9) with comorbidity ([facility] E66.01    Uncontrolled type 1 diabetes mellitus with retinopathy of both eyes ([facility] E10.319, E10.65       Current Outpatient Medications   Medication Sig Dispense Refill    ezetimibe (ZETIA) 10 mg tablet Take 1 Tab by mouth daily. Indications: hypercholesterolemia 30 Tab 6    flash glucose scanning reader (FREESTYLE JALEEL 10 DAY READER) misc Use to moniotr BS 3 Each 3    FREESTYLE JALEEL SENSOR kit USE TO CHECK AND MONITOR BLOOD SUGAR 1 Kit 0    TOUJEO MAX SOLOSTAR 300 unit/mL (3 mL) inpn INJECT 75 UNITS UNDER THE SKIN TWICE DAILY 3 UNSPECIFIED 3    insulin lispro (HUMALOG) 100 unit/mL kwikpen BS   Take 34 units   -200 Take 39 units   -250 Take 44 units   -300 Take 49 units   BS greater than 301 take 54 units. 1 Package 3    ibuprofen (MOTRIN) 800 mg tablet TAKE 1 TABLET BY MOUTH TWICE DAILY AS NEEDED FOR PAIN 60 Tab 1    lisinopril (PRINIVIL, ZESTRIL) 20 mg tablet Take 1 Tab by mouth daily. 90 Tab 1    atorvastatin (LIPITOR) 80 mg tablet Take 1 Tab by mouth daily. New Dose 30 Tab 6    linaclotide (LINZESS) 290 mcg cap capsule Take 290 mcg by mouth Daily (before breakfast).  pantoprazole (PROTONIX) 40 mg tablet TAKE 1 TABLET BY MOUTH DAILY 30 Tab 11    cyanocobalamin (VITAMIN B12) 1,000 mcg/mL injection 1,000 mcg by IntraMUSCular route every thirty (30) days.          Allergies   Allergen Reactions    Percocet [Oxycodone-Acetaminophen] Other (comments)     AMS         Past Medical History:   Diagnosis Date    Diabetes ([facility]     Diabetic retinopathy ([facility]     right eye     Gestational diabetes     Hypercholesterolemia     Hypertension     [facility] diabetes ([facility]          Past Surgical History:   Procedure Laterality Date    HX BREAST REDUCTION Bilateral     HX CATARACT REMOVAL  2018    left eye    HX  SECTION  1990    HX  SECTION  2000    HX COLONOSCOPY      HX HEENT  2016    right eye surgery- repaired blood vessel    HX HEENT 03/2017    left eye surgery- removal of scar tissue    HX HEENT  07/2018    left eye-laser    HX HEENT  10/23/2018    left eye injection    HX HEENT  12/03/2018    left eye intravitreal injection    HX HYSTERECTOMY           Family History   Problem Relation Age of Onset    Hypertension Mother     Diabetes Father     No Known Problems Brother     Asthma Daughter     Asthma Son     Psoriasis Son        Social History     Tobacco Use    Smoking status: Never Smoker    Smokeless tobacco: Never Used   Substance Use Topics    Alcohol use: No     Alcohol/week: 0.0 oz        Lab Results   Component Value Date/Time    WBC 6.7 02/06/2018 09:24 AM    HGB 13.0 02/06/2018 09:24 AM    HCT 41.8 02/06/2018 09:24 AM    PLATELET 744 73/97/9517 09:24 AM    MCV 81 02/06/2018 09:24 AM     Lab Results   Component Value Date/Time    Cholesterol, total 241 (H) 10/03/2018 08:51 AM    HDL Cholesterol 69 10/03/2018 08:51 AM    LDL, calculated 158 (H) 10/03/2018 08:51 AM    Triglyceride 71 10/03/2018 08:51 AM    CHOL/HDL Ratio 3.1 02/25/2010 08:25 AM     Lab Results   Component Value Date/Time    TSH 0.942 05/16/2017 03:47 PM      Lab Results   Component Value Date/Time    Sodium 141 09/10/2018 08:47 AM    Potassium 4.9 09/10/2018 08:47 AM    Chloride 101 09/10/2018 08:47 AM    CO2 25 09/10/2018 08:47 AM    Anion gap 8 02/25/2010 08:25 AM    Glucose 74 09/10/2018 08:47 AM    BUN 20 09/10/2018 08:47 AM    Creatinine 0.98 09/10/2018 08:47 AM    BUN/Creatinine ratio 20 09/10/2018 08:47 AM    GFR est AA 77 09/10/2018 08:47 AM    GFR est non-AA 67 09/10/2018 08:47 AM    Calcium 10.0 09/10/2018 08:47 AM    Bilirubin, total 0.2 09/10/2018 08:47 AM    ALT (SGPT) 25 09/10/2018 08:47 AM    AST (SGOT) 25 09/10/2018 08:47 AM    Alk.  phosphatase 157 (H) 09/10/2018 08:47 AM    Protein, total 7.0 09/10/2018 08:47 AM    Albumin 4.4 09/10/2018 08:47 AM    Globulin 3.3 02/25/2010 08:25 AM    A-G Ratio 1.7 09/10/2018 08:47 AM      Lab Results Component Value Date/Time    Hemoglobin A1c 11.5 (H) 02/25/2010 08:25 AM    Hemoglobin A1c (POC) 12.4 09/10/2018 08:47 AM         Review of Systems   Constitutional: Negative for malaise/fatigue. HENT: Negative for congestion. Respiratory: Negative for cough and shortness of breath. Cardiovascular: Negative for chest pain, palpitations and leg swelling. Gastrointestinal: Negative for abdominal pain, constipation and heartburn. Genitourinary: Negative for dysuria, frequency and urgency. Musculoskeletal: Negative for back pain and joint pain. Neurological: Negative for dizziness, tingling and headaches. Endo/Heme/Allergies: Negative for environmental allergies. Psychiatric/Behavioral: Negative for depression. The patient does not have insomnia. Physical Exam   Constitutional: She appears well-developed and well-nourished. /74 (BP 1 Location: Right arm, BP Patient Position: Sitting)   Pulse 88   Temp 98.7 °F (37.1 °C) (Oral)   Resp 16   Ht 5' 2\" (1.575 m)   Wt 191 lb 6.4 oz (86.8 kg)   LMP  (LMP Unknown)   SpO2 97%   BMI 35.01 kg/m²        HENT:   Right Ear: Tympanic membrane and ear canal normal.   Left Ear: Tympanic membrane and ear canal normal.   Nose: No mucosal edema or rhinorrhea. Mouth/Throat: Oropharynx is clear and moist and mucous membranes are normal.   Neck: Normal range of motion. Neck supple. No thyromegaly present. Cardiovascular: Normal rate, regular rhythm and normal heart sounds. Pulmonary/Chest: Effort normal and breath sounds normal.   Abdominal: Soft. Normal appearance and bowel sounds are normal. She exhibits no mass. There is no tenderness. Musculoskeletal: Normal range of motion. She exhibits no edema. Lymphadenopathy:     She has no cervical adenopathy. Skin: Skin is warm and dry. Psychiatric: She has a normal mood and affect. Nursing note and vitals reviewed. ASSESSMENT and PLAN  Diagnoses and all orders for this visit:    1. Pre-op examination//  2. Retinopathy  Medically stable to proceed with procedure. 3. Uncontrolled type 1 diabetes mellitus with retinopathy of both eyes ([address]  She met with the diabetic educator and her insulin SS and toujeo was adjusted. Also reviewed her diet and carb counting. She also need to engage more regular exercise as we discussed. -     AMB POC HEMOGLOBIN A1C  -     AMB POC GLUCOSE, QUANTITATIVE, BLOOD  -     insulin lispro (HUMALOG) 100 unit/mL kwikpen; BS   Take 10 units   -200 Take 15 units  -250 Take 20 units   -300 Take 25 units   BS greater than 301 take 30 units  -     insulin glargine U-300 conc (TOUJEO MAX U-300 SOLOSTAR) 300 unit/mL (3 mL) inpn; 110 Units by SubCUTAneous route daily.  -     Insulin Needles, Disposable, (PER PEN NEEDLE) 32 gauge x 5/32\" ndle; Use to inject insulin 4 times daily as directed. 4. Essential hypertension  Discussed sodium restriction, high k rich diet, maintaining ideal body weight and regular exercise program such as daily walking 30 min perday 4-5 times per week, as physiologic means to achieve blood pressure control.  Medication compliance advised. 5. Mixed hyperlipidemia  Continue to monitor. Work on diet and exercise. 6. Non morbid obesity  Discussed weight loss options, diet and exercise. Also reviewed health issues related to obesity. Initial goal of weight loss 10% of current weight. Counseled on goal for exercise of eventual goal of 30-60 minutes 5-7 times a week as per AHA guidelines. Decrease carbohydrates (white foods, sweet foods, sweet drinks and alcohol), increase green leafy vegetables and protein (lean meats and beans). Avoid fried foods. Increase water intake. Eat 3-5 small meals daily. Increase physical activity. Will start on  topiramate (TOPAMAX) 50 mg tablet; Take 1 Tab by mouth two (2) times a day to help control appetite and cravings.   Will discussed the use of this medication and side effects. Taper onto med as directed. 7. Encounter for medication monitoring//  8. Encounter for long-term (current) use of insulin ([facility]  -     METABOLIC PANEL, BASIC          Follow-up Disposition:  Return in about 6 weeks (around 1/25/2019). reviewed diet, exercise and weight control  cardiovascular risk and specific lipid/LDL goals reviewed  reviewed medications and side effects in detail  specific diabetic recommendations: low cholesterol diet, weight control and daily exercise discussed, home glucose monitoring emphasized, all medications, side effects and compliance discussed carefully, glycohemoglobin and other lab monitoring discussed, long term diabetic complications discussed and pt als met with Bautista Pearson (diabetic Educator while in the office today. will also look into insulin pump. I have discussed diagnosis listed in this note with pt and/or family. I have discussed treatment plans and options and the risk/benefit analysis of those options, including safe use of medications and possible medication side effects. Through the use of shared decision making we have agreed to the above plan. The patient has received an after-visit summary and questions were answered concerning future plans and follow up. Advise pt of any urgent changes then to proceed to the ER.

## 2018-12-14 NOTE — PATIENT INSTRUCTIONS
We will begin topamax to help with weight loss tends to work on the brain to make you less hungry and sometimes can change the taste of foods.   You will slowly increase your dose as follows:  - begin 1/2 of a 50 mg tablet at bedtime x 1 week, if tolerating without excessive daytime sleepiness or dizziness or numbness/tingling in your hands or feet, increase to  - 1/2 of a 50 mg tablet at breakfast and bedtime x 1 week, then  - 1/2 tablet at breakfast and a whole tablet at bedtime x 1week, then  - a whole 50 tablet at breakfast and bedtime

## 2018-12-14 NOTE — PROGRESS NOTES
Chief Complaint   Patient presents with    Pre-op Exam     left avastin intravitreal injection Dr. Nathalie Harrell 1/7/2019         1. Have you been to the ER, urgent care clinic since your last visit? Hospitalized since your last visit? no    2. Have you seen or consulted any other health care providers outside of the [address] [place] [address] since your last visit? Include any pap smears or colon screening.  no

## 2018-12-14 NOTE — PROGRESS NOTES
Met with patient after PCP visit, as referred by Dr. Nely Watts  She is also seeing a dietician who gave her shorter pen needles to use as she had areas of lipohypertrophy. Sees her again on 12/20 which is great  Gave log book to track food / Dinesh Toro, BG pre and post meals (has Hays), and units of insulin she's giving with meals   Hope to eventually have her be able to adjust her insulin dose based on her carb intake and her current BG. She had a low of 42 this AM, which came up to 128 after breakfast (no Humalog), then after lunch she's 392 also with no Humalog as she did not have it with her - usually she does use it though  \"never misses a toujeo dose\" - was on lantus and recently changed to toujeo, 75 units twice daily  A1c of 11.4% today better than last check of 12.4%   BG fluctuating between lows and highs; she has some hypoglycemia unawareness also so is checking her BG more often using the [place]ays which is great  In talking to her, she's not using the SS that is documented in the chart, so after d/w PCP, adjusted to more what she is using currently, and also changed Toujeo to once daily, 110 units in the AM. Lower basal dose, but seems she may be on too much basal insulin. She's DM1 patient but does have some resistance to have this high of insulin requirements. May need to adjust so will follow closely and talk Monday to see if need to make adjustments. Provided a box of leila pen needles and will put order in for these. Given my contact information if has any questions or concerns. Patient verbalized understanding of information presented. Answered all of the patient's questions. AVS handed and reviewed with patient.   Amirah Lazaro, PHARMD, CDE

## 2018-12-15 LAB
BUN SERPL-MCNC: 13 MG/DL (ref 6–24)
BUN/CREAT SERPL: 14 (ref 9–23)
CALCIUM SERPL-MCNC: 8.8 MG/DL (ref 8.7–10.2)
CHLORIDE SERPL-SCNC: 101 MMOL/L (ref 96–106)
CO2 SERPL-SCNC: 24 MMOL/L (ref 20–29)
CREAT SERPL-MCNC: 0.91 MG/DL (ref 0.57–1)
GLUCOSE SERPL-MCNC: 379 MG/DL (ref 65–99)
POTASSIUM SERPL-SCNC: 4.1 MMOL/L (ref 3.5–5.2)
SODIUM SERPL-SCNC: 138 MMOL/L (ref 134–144)

## 2019-01-04 ENCOUNTER — TELEPHONE (OUTPATIENT)
Dept: FAMILY MEDICINE CLINIC | Age: 54
End: 2019-01-04

## 2019-01-04 NOTE — TELEPHONE ENCOUNTER
Called pt to follow up on her blood sugars. Had followed up with her on Wed 12/19 and her highest sugar had been 219 and she had had one low treated appropriately. Today she states her blood sugar is much better than it was before. She does not have specific numbers. Still is having some lows but not as many as before. Reminded her of her upcoming PCP appt on 1/28; she will stay on the same regimen until that visit and contact us before if indicated for changes in blood sugar.     Zulema Mcdonough, PHARMD, CDE

## 2019-01-28 ENCOUNTER — OFFICE VISIT (OUTPATIENT)
Dept: FAMILY MEDICINE CLINIC | Age: 54
End: 2019-01-28

## 2019-01-28 VITALS
BODY MASS INDEX: 36.14 KG/M2 | DIASTOLIC BLOOD PRESSURE: 70 MMHG | OXYGEN SATURATION: 98 % | SYSTOLIC BLOOD PRESSURE: 146 MMHG | HEIGHT: 62 IN | RESPIRATION RATE: 16 BRPM | HEART RATE: 82 BPM | TEMPERATURE: 97.3 F | WEIGHT: 196.4 LBS

## 2019-01-28 DIAGNOSIS — E78.2 MIXED HYPERLIPIDEMIA: ICD-10-CM

## 2019-01-28 DIAGNOSIS — Z01.818 PRE-OP EXAM: Primary | ICD-10-CM

## 2019-01-28 DIAGNOSIS — Z79.4 ENCOUNTER FOR LONG-TERM (CURRENT) USE OF INSULIN (HCC): ICD-10-CM

## 2019-01-28 DIAGNOSIS — G47.33 OBSTRUCTIVE SLEEP APNEA SYNDROME: ICD-10-CM

## 2019-01-28 DIAGNOSIS — Z51.81 ENCOUNTER FOR MEDICATION MONITORING: ICD-10-CM

## 2019-01-28 DIAGNOSIS — E66.9 NON MORBID OBESITY: ICD-10-CM

## 2019-01-28 DIAGNOSIS — H35.00 RETINOPATHY: ICD-10-CM

## 2019-01-28 DIAGNOSIS — I10 ESSENTIAL HYPERTENSION: ICD-10-CM

## 2019-01-28 LAB
BILIRUB UR QL STRIP: NEGATIVE
GLUCOSE POC: 75 MG/DL
GLUCOSE UR-MCNC: NEGATIVE MG/DL
KETONES P FAST UR STRIP-MCNC: NORMAL MG/DL
PH UR STRIP: 6 [PH] (ref 4.6–8)
PROT UR QL STRIP: NEGATIVE
SP GR UR STRIP: 1.02 (ref 1–1.03)
UA UROBILINOGEN AMB POC: NORMAL (ref 0.2–1)
URINALYSIS CLARITY POC: NORMAL
URINALYSIS COLOR POC: YELLOW
URINE BLOOD POC: NEGATIVE
URINE LEUKOCYTES POC: NORMAL
URINE NITRITES POC: NEGATIVE

## 2019-01-28 RX ORDER — LISINOPRIL 20 MG/1
40 TABLET ORAL DAILY
Qty: 180 TAB | Refills: 1 | Status: SHIPPED | OUTPATIENT
Start: 2019-01-28 | End: 2019-01-28 | Stop reason: SDUPTHER

## 2019-01-28 RX ORDER — LISINOPRIL 20 MG/1
40 TABLET ORAL DAILY
Qty: 180 TAB | Refills: 1 | Status: SHIPPED | OUTPATIENT
Start: 2019-01-28 | End: 2021-07-02 | Stop reason: DRUGHIGH

## 2019-01-28 NOTE — PROGRESS NOTES
HISTORY OF PRESENT ILLNESS Taryn Almanzar is a 48 y.o. female. Pre-op for avastin intravitreal injection left eye. Cardiovascular Review: 
The patient has hypertension and hyperlipidemia. Diet and Lifestyle: generally follows a low fat low cholesterol diet, generally follows a low sodium diet, exercises sporadically, nonsmoker Home BP Monitoring: is not measured at home. Pertinent ROS: taking medications as instructed, no medication side effects noted, no TIA's, no chest pain on exertion, no dyspnea on exertion, no swelling of ankles, no palpitations, no muscle aches or pain. Diabetes Mellitus: 
She has diabetes mellitus type 1 dx at the age of 25. She has been on insulin since her diagnoses. She has retinopathy. Diabetic ROS - medication compliance: compliant most of the time, diabetic diet compliance: compliant most of the time, home glucose monitoring: is performed regularly. She states that her BS are much better since the changes were made by the pharmacist, Sharmin La. Her food choices have been better. She did complete the sleep study and does have sleep apnea. Waiting for the CPAP. Further diabetic ROS: no chest pain, dyspnea or TIA's, no numbness, has some slight tingling in extremities. Lab review: orders written for new lab studies as appropriate; see orders. Patient Active Problem List  
Diagnosis Code  Gastroesophageal reflux disease without esophagitis K21.9  History of hysterectomy for benign disease Z90.710  
 Diabetic retinopathy ([address] E11.319  Type 1 diabetes mellitus with retinopathy of right eye without macular edema (HCC) E10.319  
 Encounter for long-term (current) use of insulin ([address] Z79.4  
 Encounter for medication monitoring Z51.81  
 Essential hypertension I10  
 Mixed hyperlipidemia E78.2  Chronic constipation K59.09  Severe obesity (BMI 35.0-39. 9) with comorbidity ([address] E66.01  
  Uncontrolled type 1 diabetes mellitus with retinopathy of both eyes ([facility]) E10.319, E10.65 Current Outpatient Medications Medication Sig Dispense Refill  topiramate (TOPAMAX) 50 mg tablet Take 1 Tab by mouth two (2) times a day. 60 Tab 3  
 insulin lispro (HUMALOG) 100 unit/mL kwikpen BS   Take 10 units -200 Take 15 units -250 Take 20 units -300 Take 25 units BS greater than 301 take 30 units 1 Package 3  
 insulin glargine U-300 conc (TOUJEO MAX U-300 SOLOSTAR) 300 unit/mL (3 mL) inpn 110 Units by SubCUTAneous route daily. 12 Syringe 3  
 Insulin Needles, Disposable, (PER PEN NEEDLE) 32 gauge x 5/32\" ndle Use to inject insulin 4 times daily as directed. 100 Pen Needle 11  
 ezetimibe (ZETIA) 10 mg tablet Take 1 Tab by mouth daily. Indications: hypercholesterolemia 30 Tab 6  
 flash glucose scanning reader (FREESTYLE JALEEL 10 DAY READER) [facility]  
 FREESTYLE JALEEL SENSOR kit USE TO CHECK AND MONITOR BLOOD SUGAR 1 Kit 0  
 ibuprofen (MOTRIN) 800 mg tablet TAKE 1 TABLET BY MOUTH TWICE DAILY AS NEEDED FOR PAIN 60 Tab 1  
 lisinopril (PRINIVIL, ZESTRIL) 20 mg tablet Take 1 Tab by mouth daily. 90 Tab 1  
 atorvastatin (LIPITOR) 80 mg tablet Take 1 Tab by mouth daily. New Dose 30 Tab 6  
 linaclotide (LINZESS) 290 mcg cap capsule Take 290 mcg by mouth Daily (before breakfast).  pantoprazole (PROTONIX) 40 mg tablet TAKE 1 TABLET BY MOUTH DAILY 30 Tab 11  
 cyanocobalamin (VITAMIN B12) 1,000 mcg/mL injection 1,000 mcg by IntraMUSCular route every thirty (30) days. Allergies Allergen Reactions  Percocet [Oxycodone-Acetaminophen] Other (comments) AMS Past Medical History:  
Diagnosis Date  Diabetes ([address]  Diabetic retinopathy ([facility] [address] right eye  Gestational diabetes  Hypercholesterolemia  Hypertension  Type 1 diabetes ([facility] [address] Past Surgical History:  
Procedure Laterality Date  HX BREAST REDUCTION Bilateral 1984  HX CATARACT REMOVAL  2018  
 left eye  HX  SECTION  1990  
 HX  SECTION  2000  HX COLONOSCOPY    
 HX HEENT  2016  
 right eye surgery- repaired blood vessel  HX HEENT  2017  
 left eye surgery- removal of scar tissue  HX HEENT  2018  
 left eye-laser  HX HEENT  10/23/2018  
 left eye injection  HX HEENT  2018  
 left eye intravitreal injection  HX HEENT  2019  
 left eye intravitreal injection  HX HYSTERECTOMY partial  
 
 
 
Family History Problem Relation Age of Onset  Hypertension Mother  Diabetes Father  No Known Problems Brother  Suicide Brother  Asthma Daughter  Asthma Son  Psoriasis Son   
 
 
Social History Tobacco Use  Smoking status: Never Smoker  Smokeless tobacco: Never Used Substance Use Topics  Alcohol use: No  
  Alcohol/week: 0.0 oz Lab Results Component Value Date/Time WBC 6.7 2018 09:24 AM  
 HGB 13.0 2018 09:24 AM  
 HCT 41.8 2018 09:24 AM  
 PLATELET 155  09:24 AM  
 MCV 81 2018 09:24 AM  
 
Lab Results Component Value Date/Time Cholesterol, total 241 (H) 10/03/2018 08:51 AM  
 HDL Cholesterol 69 10/03/2018 08:51 AM  
 LDL, calculated 158 (H) 10/03/2018 08:51 AM  
 Triglyceride 71 10/03/2018 08:51 AM  
 CHOL/HDL Ratio 3.1 2010 08:25 AM  
 
Lab Results Component Value Date/Time TSH 0.942 2017 03:47 PM  
  
Lab Results Component Value Date/Time  Sodium 138 2018 02:54 PM  
 Potassium 4.1 2018 02:54 PM  
 Chloride 101 2018 02:54 PM  
 CO2 24 2018 02:54 PM  
 Anion gap 8 2010 08:25 AM  
 Glucose 379 (H) 2018 02:54 PM  
 BUN 13 2018 02:54 PM  
 Creatinine 0.91 2018 02:54 PM  
 BUN/Creatinine ratio 14 2018 02:54 PM  
 GFR est AA 83 2018 02:54 PM  
 GFR est non-AA 72 2018 02:54 PM  
 Calcium 8.8 12/14/2018 02:54 PM  
 Bilirubin, total 0.2 09/10/2018 08:47 AM  
 ALT (SGPT) 25 09/10/2018 08:47 AM  
 AST (SGOT) 25 09/10/2018 08:47 AM  
 Alk. phosphatase 157 (H) 09/10/2018 08:47 AM  
 Protein, total 7.0 09/10/2018 08:47 AM  
 Albumin 4.4 09/10/2018 08:47 AM  
 Globulin 3.3 02/25/2010 08:25 AM  
 A-G Ratio 1.7 09/10/2018 08:47 AM  
  
Lab Results Component Value Date/Time Hemoglobin A1c 11.5 (H) 02/25/2010 08:25 AM  
 Hemoglobin A1c (POC) 11.1 12/14/2018 02:54 PM  
   
Review of Systems Constitutional: Negative for malaise/fatigue. HENT: Negative for congestion. Respiratory: Negative for cough and shortness of breath. Cardiovascular: Negative for chest pain, palpitations and leg swelling. Gastrointestinal: Negative for abdominal pain, constipation and heartburn. Genitourinary: Negative for dysuria, frequency and urgency. Musculoskeletal: Negative for back pain and joint pain. Neurological: Negative for dizziness, tingling and headaches. Endo/Heme/Allergies: Negative for environmental allergies. Psychiatric/Behavioral: Negative for depression. The patient does not have insomnia. Physical Exam  
Constitutional: She appears well-developed and well-nourished. /70   Pulse 82   Temp 97.3 °F (36.3 °C) (Oral)   Resp 16   Ht 5' 2\" (1.575 m)   Wt 196 lb 6.4 oz (89.1 kg)   LMP 04/09/2001   SpO2 98%   BMI 35.92 kg/m² HENT:  
Right Ear: Tympanic membrane and ear canal normal.  
Left Ear: Tympanic membrane and ear canal normal.  
Nose: No mucosal edema or rhinorrhea. Mouth/Throat: Oropharynx is clear and moist and mucous membranes are normal.  
Neck: Normal range of motion. Neck supple. No thyromegaly present. Cardiovascular: Normal rate, regular rhythm and normal heart sounds. Exam reveals no gallop. Pulmonary/Chest: Effort normal and breath sounds normal.  
Abdominal: Soft.  Normal appearance and bowel sounds are normal. She exhibits no mass. There is no tenderness. Musculoskeletal: Normal range of motion. She exhibits no edema. Lymphadenopathy:  
  She has no cervical adenopathy. Skin: Skin is warm and dry. Psychiatric: She has a normal mood and affect. Nursing note and vitals reviewed. ASSESSMENT and PLAN Diagnoses and all orders for this visit: 1. Pre-op exam// 
2. Retinopathy -     AMB POC URINALYSIS DIP STICK MANUAL W/O MICRO Medically stable to proceed with procedure 3. Essential hypertension -     Increase lisinopril (PRINIVIL, ZESTRIL) 20 mg tablet; Take 2 Tabs by mouth daily. Discussed sodium restriction, high k rich diet, maintaining ideal body weight and regular exercise program such as daily walking 30 min perday 4-5 times per week, as physiologic means to achieve blood pressure control.  Medication compliance advised. 4. Uncontrolled type 1 diabetes mellitus with retinopathy of both eyes ([address] -     AMB POC GLUCOSE, QUANTITATIVE, BLOOD Continue with current regimen. 5. Mixed hyperlipidemia -     LIPID PANEL 6. Obstructive sleep apnea syndrome As per specialist.   
 
7. Non morbid obesity I have reviewed/discussed the above normal BMI with the patient. I have recommended the following interventions: dietary management education, guidance, and counseling . 8. Encounter for medication monitoring// 
9. Encounter for long-term (current) use of insulin ([address] -     METABOLIC PANEL, COMPREHENSIVE 
-     CBC W/O DIFF Follow-up Disposition: 
Return in about 2 months (around 4/1/2019).  
reviewed diet, exercise and weight control 
cardiovascular risk and specific lipid/LDL goals reviewed 
reviewed medications and side effects in detail 
specific diabetic recommendations: low cholesterol diet, weight control and daily exercise discussed, home glucose monitoring emphasized, all medications, side effects and compliance discussed carefully and glycohemoglobin and other lab monitoring discussed I have discussed diagnosis listed in this note with pt and/or family. I have discussed treatment plans and options and the risk/benefit analysis of those options, including safe use of medications and possible medication side effects. Through the use of shared decision making we have agreed to the above plan. The patient has received an after-visit summary and questions were answered concerning future plans and follow up. Advise pt of any urgent changes then to proceed to the ER.

## 2019-01-28 NOTE — PROGRESS NOTES
HISTORY OF PRESENT ILLNESS John Zuluaga is a 48 y.o. female. HPI Follow up on chronic medical problems. Overall has been feeling well. Cardiovascular Review: 
The patient has hypertension and hyperlipidemia. Diet and Lifestyle: generally follows a low fat low cholesterol diet, generally follows a low sodium diet, exercises sporadically, nonsmoker Home BP Monitoring: is not measured at home. Pertinent ROS: taking medications as instructed, no medication side effects noted, no TIA's, no chest pain on exertion, no dyspnea on exertion, no swelling of ankles, no palpitations, no muscle aches or pain. Diabetes Mellitus: 
She has diabetes mellitus type 1 dx at the age of 25. She has been on insulin since her diagnoses. She has retinopathy. Diabetic ROS - medication compliance: compliant most of the time, diabetic diet compliance: compliant most of the time, home glucose monitoring: is performed regularly, check BS 3 to 4 times in a day for SS humalog insulin, she states that her BS are still not very good at times. Her food choices are not always good. She has not been exercising. She sleep well at night but does snore. Further diabetic ROS: no chest pain, dyspnea or TIA's, no numbness, tingling or pain in extremities. Lab review: orders written for new lab studies as appropriate; see orders. Patient Active Problem List  
Diagnosis Code  Gastroesophageal reflux disease without esophagitis K21.9  History of hysterectomy for benign disease Z90.710  
 Diabetic retinopathy ([address] E11.319  Type 1 diabetes mellitus with retinopathy of right eye without macular edema (HCC) E10.319  
 Encounter for long-term (current) use of insulin ([address] Z79.4  
 Encounter for medication monitoring Z51.81  
 Essential hypertension I10  
 Mixed hyperlipidemia E78.2  Chronic constipation K59.09  Severe obesity (BMI 35.0-39. 9) with comorbidity ([address] E66.01  
  Uncontrolled type 1 diabetes mellitus with retinopathy of both eyes ([facility]) E10.319, E10.65 Current Outpatient Medications Medication Sig Dispense Refill  topiramate (TOPAMAX) 50 mg tablet Take 1 Tab by mouth two (2) times a day. 60 Tab 3  
 insulin lispro (HUMALOG) 100 unit/mL kwikpen BS   Take 10 units -200 Take 15 units -250 Take 20 units -300 Take 25 units BS greater than 301 take 30 units 1 Package 3  
 insulin glargine U-300 conc (TOUJEO MAX U-300 SOLOSTAR) 300 unit/mL (3 mL) inpn 110 Units by SubCUTAneous route daily. 12 Syringe 3  
 Insulin Needles, Disposable, (PER PEN NEEDLE) 32 gauge x 5/32\" ndle Use to inject insulin 4 times daily as directed. 100 Pen Needle 11  
 ezetimibe (ZETIA) 10 mg tablet Take 1 Tab by mouth daily. Indications: hypercholesterolemia 30 Tab 6  
 flash glucose scanning reader (FREESTYLE JALEEL 10 DAY READER) [facility] Each 3  
 FREESTYLE JALEEL SENSOR kit USE TO CHECK AND MONITOR BLOOD SUGAR 1 Kit 0  
 ibuprofen (MOTRIN) 800 mg tablet TAKE 1 TABLET BY MOUTH TWICE DAILY AS NEEDED FOR PAIN 60 Tab 1  
 lisinopril (PRINIVIL, ZESTRIL) 20 mg tablet Take 1 Tab by mouth daily. 90 Tab 1  
 atorvastatin (LIPITOR) 80 mg tablet Take 1 Tab by mouth daily. New Dose 30 Tab 6  
 linaclotide (LINZESS) 290 mcg cap capsule Take 290 mcg by mouth Daily (before breakfast).  pantoprazole (PROTONIX) 40 mg tablet TAKE 1 TABLET BY MOUTH DAILY 30 Tab 11  
 cyanocobalamin (VITAMIN B12) 1,000 mcg/mL injection 1,000 mcg by IntraMUSCular route every thirty (30) days. Allergies Allergen Reactions  Percocet [Oxycodone-Acetaminophen] Other (comments) AMS Past Medical History:  
Diagnosis Date  Diabetes ([address]  Diabetic retinopathy ([facility] [address] right eye  Gestational diabetes  Hypercholesterolemia  Hypertension  Type 1 diabetes ([facility] [address] Past Surgical History:  
Procedure Laterality Date  HX BREAST REDUCTION Bilateral 1984  HX CATARACT REMOVAL  2018  
 left eye  HX  SECTION  1990  
 HX  SECTION  2000  HX COLONOSCOPY    
 HX HEENT  2016  
 right eye surgery- repaired blood vessel  HX HEENT  2017  
 left eye surgery- removal of scar tissue  HX HEENT  2018  
 left eye-laser  HX HEENT  10/23/2018  
 left eye injection  HX HEENT  2018  
 left eye intravitreal injection  HX HYSTERECTOMY Family History Problem Relation Age of Onset  Hypertension Mother  Diabetes Father  No Known Problems Brother  Asthma Daughter  Asthma Son  Psoriasis Son   
 
 
Social History Tobacco Use  Smoking status: Never Smoker  Smokeless tobacco: Never Used Substance Use Topics  Alcohol use: No  
  Alcohol/week: 0.0 oz Lab Results Component Value Date/Time WBC 6.7 2018 09:24 AM  
 HGB 13.0 2018 09:24 AM  
 HCT 41.8 2018 09:24 AM  
 PLATELET 077  09:24 AM  
 MCV 81 2018 09:24 AM  
 
Lab Results Component Value Date/Time Cholesterol, total 241 (H) 10/03/2018 08:51 AM  
 HDL Cholesterol 69 10/03/2018 08:51 AM  
 LDL, calculated 158 (H) 10/03/2018 08:51 AM  
 Triglyceride 71 10/03/2018 08:51 AM  
 CHOL/HDL Ratio 3.1 2010 08:25 AM  
 
Lab Results Component Value Date/Time TSH 0.942 2017 03:47 PM  
  
Lab Results Component Value Date/Time  Sodium 138 2018 02:54 PM  
 Potassium 4.1 2018 02:54 PM  
 Chloride 101 2018 02:54 PM  
 CO2 24 2018 02:54 PM  
 Anion gap 8 2010 08:25 AM  
 Glucose 379 (H) 2018 02:54 PM  
 BUN 13 2018 02:54 PM  
 Creatinine 0.91 2018 02:54 PM  
 BUN/Creatinine ratio 14 2018 02:54 PM  
 GFR est AA 83 2018 02:54 PM  
 GFR est non-AA 72 2018 02:54 PM  
 Calcium 8.8 2018 02:54 PM  
 Bilirubin, total 0.2 09/10/2018 08:47 AM  
 ALT (SGPT) 25 09/10/2018 08:47 AM  
 AST (SGOT) 25 09/10/2018 08:47 AM  
 Alk. phosphatase 157 (H) 09/10/2018 08:47 AM  
 Protein, total 7.0 09/10/2018 08:47 AM  
 Albumin 4.4 09/10/2018 08:47 AM  
 Globulin 3.3 02/25/2010 08:25 AM  
 A-G Ratio 1.7 09/10/2018 08:47 AM  
  
Lab Results Component Value Date/Time Hemoglobin A1c 11.5 (H) 02/25/2010 08:25 AM  
 Hemoglobin A1c (POC) 11.1 12/14/2018 02:54 PM  
   
Review of Systems Constitutional: Negative for malaise/fatigue. HENT: Negative for congestion. Eyes: Negative for blurred vision. Respiratory: Negative for cough and shortness of breath. Cardiovascular: Negative for chest pain, palpitations and leg swelling. Gastrointestinal: Negative for abdominal pain, constipation and heartburn. Genitourinary: Negative for dysuria, frequency and urgency. Musculoskeletal: Negative for back pain and joint pain. Neurological: Negative for dizziness, tingling and headaches. Endo/Heme/Allergies: Negative for environmental allergies. Psychiatric/Behavioral: Negative for depression. The patient does not have insomnia. Physical Exam  
Constitutional: She appears well-developed and well-nourished. /79 (BP 1 Location: Left arm, BP Patient Position: Sitting)  Pulse 82  Temp 98.2 °F (36.8 °C) (Oral)   Resp 16  Ht 5' 2\" (1.575 m)  Wt 183 lb 3.2 oz (83.1 kg)  LMP  (LMP Unknown)  SpO2 97%  BMI 33.51 kg/m2 HENT:  
Right Ear: Tympanic membrane and ear canal normal.  
Left Ear: Tympanic membrane and ear canal normal.  
Nose: No mucosal edema or rhinorrhea. Mouth/Throat: Oropharynx is clear and moist and mucous membranes are normal.  
Neck: Normal range of motion. Neck supple. No thyromegaly present. Cardiovascular: Normal rate and regular rhythm. No murmur heard. Pulmonary/Chest: Effort normal and breath sounds normal.  
Abdominal: Soft. Bowel sounds are normal. There is no tenderness. Musculoskeletal: Normal range of motion. She exhibits no edema. Lymphadenopathy:  
  She has no cervical adenopathy. Skin: Skin is warm and dry. Psychiatric: She has a normal mood and affect. Nursing note and vitals reviewed. ASSESSMENT and PLAN 
{ASSESSMENT/PLAN:[id number]}

## 2019-01-28 NOTE — PROGRESS NOTES
Chief Complaint   Patient presents with    Pre-op Exam     left avastin intravitreal injection by Dr. Taisha Fulton 2/11/2019     1. Have you been to the ER, urgent care clinic since your last visit? Hospitalized since your last visit? No    2. Have you seen or consulted any other health care providers outside of the [address] [place] [address] since your last visit? Include any pap smears or colon screening.  No

## 2019-01-29 LAB
ALBUMIN SERPL-MCNC: 4.3 G/DL (ref 3.5–5.5)
ALBUMIN/GLOB SERPL: 1.7 {RATIO} (ref 1.2–2.2)
ALP SERPL-CCNC: 151 IU/L (ref 39–117)
ALT SERPL-CCNC: 35 IU/L (ref 0–32)
AST SERPL-CCNC: 36 IU/L (ref 0–40)
BILIRUB SERPL-MCNC: <0.2 MG/DL (ref 0–1.2)
BUN SERPL-MCNC: 15 MG/DL (ref 6–24)
BUN/CREAT SERPL: 16 (ref 9–23)
CALCIUM SERPL-MCNC: 9.4 MG/DL (ref 8.7–10.2)
CHLORIDE SERPL-SCNC: 106 MMOL/L (ref 96–106)
CHOLEST SERPL-MCNC: 198 MG/DL (ref 100–199)
CO2 SERPL-SCNC: 22 MMOL/L (ref 20–29)
CREAT SERPL-MCNC: 0.94 MG/DL (ref 0.57–1)
ERYTHROCYTE [DISTWIDTH] IN BLOOD BY AUTOMATED COUNT: 14.2 % (ref 12.3–15.4)
GLOBULIN SER CALC-MCNC: 2.6 G/DL (ref 1.5–4.5)
GLUCOSE SERPL-MCNC: 69 MG/DL (ref 65–99)
HCT VFR BLD AUTO: 40.3 % (ref 34–46.6)
HDLC SERPL-MCNC: 61 MG/DL
HGB BLD-MCNC: 12.8 G/DL (ref 11.1–15.9)
INTERPRETATION, 910389: NORMAL
LDLC SERPL CALC-MCNC: 125 MG/DL (ref 0–99)
MCH RBC QN AUTO: 25.3 PG (ref 26.6–33)
MCHC RBC AUTO-ENTMCNC: 31.8 G/DL (ref 31.5–35.7)
MCV RBC AUTO: 80 FL (ref 79–97)
PLATELET # BLD AUTO: 280 X10E3/UL (ref 150–379)
POTASSIUM SERPL-SCNC: 4.3 MMOL/L (ref 3.5–5.2)
PROT SERPL-MCNC: 6.9 G/DL (ref 6–8.5)
RBC # BLD AUTO: 5.06 X10E6/UL (ref 3.77–5.28)
SODIUM SERPL-SCNC: 146 MMOL/L (ref 134–144)
TRIGL SERPL-MCNC: 60 MG/DL (ref 0–149)
VLDLC SERPL CALC-MCNC: 12 MG/DL (ref 5–40)
WBC # BLD AUTO: 6.2 X10E3/UL (ref 3.4–10.8)

## 2019-02-07 RX ORDER — IBUPROFEN 800 MG/1
TABLET ORAL
Qty: 60 TAB | Refills: 0 | Status: SHIPPED | OUTPATIENT
Start: 2019-02-07 | End: 2019-03-08 | Stop reason: SDUPTHER

## 2019-02-11 RX ORDER — FLASH GLUCOSE SENSOR
KIT MISCELLANEOUS
Qty: 3 KIT | Refills: 0 | Status: SHIPPED | OUTPATIENT
Start: 2019-02-11 | End: 2019-03-23 | Stop reason: SDUPTHER

## 2019-02-13 RX ORDER — INSULIN GLARGINE 300 U/ML
INJECTION, SOLUTION SUBCUTANEOUS
Qty: 15 ML | Refills: 6 | Status: SHIPPED | OUTPATIENT
Start: 2019-02-13 | End: 2019-03-29

## 2019-03-08 RX ORDER — IBUPROFEN 800 MG/1
TABLET ORAL
Qty: 60 TAB | Refills: 0 | Status: SHIPPED | OUTPATIENT
Start: 2019-03-08 | End: 2019-03-29 | Stop reason: SDUPTHER

## 2019-03-24 RX ORDER — FLASH GLUCOSE SENSOR
KIT MISCELLANEOUS
Qty: 3 KIT | Refills: 0 | Status: SHIPPED | OUTPATIENT
Start: 2019-03-24 | End: 2019-09-20

## 2019-03-29 ENCOUNTER — OFFICE VISIT (OUTPATIENT)
Dept: FAMILY MEDICINE CLINIC | Age: 54
End: 2019-03-29

## 2019-03-29 VITALS
BODY MASS INDEX: 37.06 KG/M2 | SYSTOLIC BLOOD PRESSURE: 128 MMHG | OXYGEN SATURATION: 98 % | HEART RATE: 80 BPM | HEIGHT: 62 IN | WEIGHT: 201.4 LBS | TEMPERATURE: 97.6 F | DIASTOLIC BLOOD PRESSURE: 65 MMHG | RESPIRATION RATE: 16 BRPM

## 2019-03-29 DIAGNOSIS — Z12.11 COLON CANCER SCREENING: ICD-10-CM

## 2019-03-29 DIAGNOSIS — I10 ESSENTIAL HYPERTENSION: Primary | ICD-10-CM

## 2019-03-29 DIAGNOSIS — E78.2 MIXED HYPERLIPIDEMIA: ICD-10-CM

## 2019-03-29 DIAGNOSIS — Z51.81 ENCOUNTER FOR MEDICATION MONITORING: ICD-10-CM

## 2019-03-29 DIAGNOSIS — Z79.4 ENCOUNTER FOR LONG-TERM (CURRENT) USE OF INSULIN (HCC): ICD-10-CM

## 2019-03-29 DIAGNOSIS — G47.33 OBSTRUCTIVE SLEEP APNEA SYNDROME: ICD-10-CM

## 2019-03-29 DIAGNOSIS — E53.8 B12 DEFICIENCY: ICD-10-CM

## 2019-03-29 DIAGNOSIS — E66.9 NON MORBID OBESITY: ICD-10-CM

## 2019-03-29 LAB
GLUCOSE POC: 135 MG/DL
HBA1C MFR BLD HPLC: 9.4 %

## 2019-03-29 RX ORDER — CYANOCOBALAMIN 1000 UG/ML
1000 INJECTION, SOLUTION INTRAMUSCULAR; SUBCUTANEOUS
Qty: 1 VIAL | Refills: 6
Start: 2019-03-29 | End: 2019-09-20 | Stop reason: SDUPTHER

## 2019-03-29 RX ORDER — INSULIN LISPRO 100 [IU]/ML
INJECTION, SOLUTION INTRAVENOUS; SUBCUTANEOUS
COMMUNITY
End: 2019-09-20 | Stop reason: SDUPTHER

## 2019-03-29 NOTE — PROGRESS NOTES
HISTORY OF PRESENT ILLNESS Aren De Santiago is a 48 y.o. female. Follow up on chronic medical problems. Overall has been feeling well. Cardiovascular Review: 
The patient has hypertension and hyperlipidemia. Diet and Lifestyle: generally follows a low fat low cholesterol diet, generally follows a low sodium diet, exercises sporadically, nonsmoker Home BP Monitoring: is not measured at home. Pertinent ROS: taking medications as instructed, no medication side effects noted, no TIA's, no chest pain on exertion, no dyspnea on exertion, no swelling of ankles, no palpitations, no muscle aches or pain. Diabetes Mellitus: 
She has diabetes mellitus type 1 dx at the age of 25. She has been on insulin since her diagnoses. She has retinopathy. Diabetic ROS - medication compliance: compliant most of the time, diabetic diet compliance: compliant most of the time, home glucose monitoring: is performed regularly, check BS 3 to 4 times in a day for SS humalog insulin, she states that her BS are low sometimes in the morning in the 60s. She gets up and eats crackers and drinks OJ to help her BS come back up. Her food choices have been better. She has not been exercising. Further diabetic ROS: no chest pain, dyspnea or TIA's, no numbness, tingling or pain in extremities. Lab review: orders written for new lab studies as appropriate; see orders. Patient Active Problem List  
Diagnosis Code  Gastroesophageal reflux disease without esophagitis K21.9  History of hysterectomy for benign disease Z90.710  
 Diabetic retinopathy ([facility] E11.319  Type 1 diabetes mellitus with retinopathy of right eye without macular edema (HCC) E10.319  
 Encounter for long-term (current) use of insulin ([address] Z79.4  
 Encounter for medication monitoring Z51.81  
 Essential hypertension I10  
 Mixed hyperlipidemia E78.2  Chronic constipation K59.09  Severe obesity (BMI 35.0-39. 9) with comorbidity ([address] E66.01  
  Uncontrolled type 1 diabetes mellitus with retinopathy of both eyes ([facility]) E10.319, E10.65 Current Outpatient Medications Medication Sig Dispense Refill  cyanocobalamin (VITAMIN B12) 1,000 mcg/mL injection 1 mL by IntraMUSCular route every thirty (30) days. 1 Vial 6  
 FREESTYLE JALEEL 10 DAY SENSOR kit USE TO MONITOR BLOOD SUAGR 3 Kit 0  
 pantoprazole (PROTONIX) 40 mg tablet TAKE 1 TABLET BY MOUTH ONCE DAILY 30 Tab 11  
 lisinopril (PRINIVIL, ZESTRIL) 20 mg tablet Take 2 Tabs by mouth daily. 180 Tab 1  
 topiramate (TOPAMAX) 50 mg tablet Take 1 Tab by mouth two (2) times a day. 60 Tab 3  
 insulin glargine U-300 conc (TOUJEO MAX U-300 SOLOSTAR) 300 unit/mL (3 mL) inpn 110 Units by SubCUTAneous route daily. 12 Syringe 3  
 Insulin Needles, Disposable, (PER PEN NEEDLE) 32 gauge x 5/32\" ndle Use to inject insulin 4 times daily as directed. 100 Pen Needle 11  
 ezetimibe (ZETIA) 10 mg tablet Take 1 Tab by mouth daily. Indications: hypercholesterolemia 30 Tab 6  
 flash glucose scanning reader (FREESTYLE JALEEL 10 DAY READER) misc Use to moniotr BS 3 Each 3  
 ibuprofen (MOTRIN) 800 mg tablet TAKE 1 TABLET BY MOUTH TWICE DAILY AS NEEDED FOR PAIN 60 Tab 1  
 atorvastatin (LIPITOR) 80 mg tablet Take 1 Tab by mouth daily. New Dose 30 Tab 6  
 linaclotide (LINZESS) 290 mcg cap capsule Take 290 mcg by mouth Daily (before breakfast).  TOUJEO MAX U-300 SOLOSTAR 300 unit/mL (3 mL) inpn INJECT 75 UNITS UNDER THE SKIN TWICE DAILY 15 mL 6  
 insulin lispro (HUMALOG KWIKPEN INSULIN) 100 unit/mL kwikpen FOR BLOOD SUGAR  USE 30 UNITS, 151-200 USE 35 UNITS, 201-250 USE 40 UNITS, 251-300 USE 45 UNITS, AND>301 USE 50 UNITS 30 mL 3 Allergies Allergen Reactions  Percocet [Oxycodone-Acetaminophen] Other (comments) AMS Past Medical History:  
Diagnosis Date  Diabetes ([address]  Diabetic retinopathy ([address] right eye  Gestational diabetes  Hypercholesterolemia  Hypertension  Type 1 diabetes ([facility] Past Surgical History:  
Procedure Laterality Date  HX BREAST REDUCTION Bilateral 1984  HX CATARACT REMOVAL  2018  
 left eye  HX  SECTION  1990  
 HX  SECTION  2000  HX COLONOSCOPY    
 HX HEENT  2016  
 right eye surgery- repaired blood vessel  HX HEENT  2017  
 left eye surgery- removal of scar tissue  HX HEENT  2018  
 left eye-laser  HX HEENT  10/23/2018  
 left eye injection  HX HEENT  2018  
 left eye intravitreal injection  HX HEENT  2019  
 left eye intravitreal injection  HX HYSTERECTOMY partial  
 
 
 
Family History Problem Relation Age of Onset  Hypertension Mother  Diabetes Father  No Known Problems Brother  Suicide Brother  Asthma Daughter  Asthma Son  Psoriasis Son   
 
 
Social History Tobacco Use  Smoking status: Never Smoker  Smokeless tobacco: Never Used Substance Use Topics  Alcohol use: No  
  Alcohol/week: 0.0 oz Lab Results Component Value Date/Time WBC 6.2 2019 09:10 AM  
 HGB 12.8 2019 09:10 AM  
 HCT 40.3 2019 09:10 AM  
 PLATELET 432  09:10 AM  
 MCV 80 2019 09:10 AM  
 
Lab Results Component Value Date/Time Cholesterol, total 198 2019 09:10 AM  
 HDL Cholesterol 61 2019 09:10 AM  
 LDL, calculated 125 (H) 2019 09:10 AM  
 Triglyceride 60 2019 09:10 AM  
 CHOL/HDL Ratio 3.1 2010 08:25 AM  
 
Lab Results Component Value Date/Time TSH 0.942 2017 03:47 PM  
  
Lab Results Component Value Date/Time  Sodium 146 (H) 2019 09:10 AM  
 Potassium 4.3 2019 09:10 AM  
 Chloride 106 2019 09:10 AM  
 CO2 22 2019 09:10 AM  
 Anion gap 8 2010 08:25 AM  
 Glucose 69 2019 09:10 AM  
 BUN 15 2019 09:10 AM  
 Creatinine 0.94 2019 09:10 AM  
 BUN/Creatinine ratio 16 2019 09:10 AM  
 GFR est AA 80 01/28/2019 09:10 AM  
 GFR est non-AA 69 01/28/2019 09:10 AM  
 Calcium 9.4 01/28/2019 09:10 AM  
 Bilirubin, total <0.2 01/28/2019 09:10 AM  
 ALT (SGPT) 35 (H) 01/28/2019 09:10 AM  
 AST (SGOT) 36 01/28/2019 09:10 AM  
 Alk. phosphatase 151 (H) 01/28/2019 09:10 AM  
 Protein, total 6.9 01/28/2019 09:10 AM  
 Albumin 4.3 01/28/2019 09:10 AM  
 Globulin 3.3 02/25/2010 08:25 AM  
 A-G Ratio 1.7 01/28/2019 09:10 AM  
  
Lab Results Component Value Date/Time Hemoglobin A1c 11.5 (H) 02/25/2010 08:25 AM  
 Hemoglobin A1c (POC) 11.1 12/14/2018 02:54 PM  
   
 
Review of Systems Constitutional: Negative for malaise/fatigue. HENT: Negative for congestion. Eyes: Negative for blurred vision. Respiratory: Negative for cough and shortness of breath. Cardiovascular: Negative for chest pain, palpitations and leg swelling. Gastrointestinal: Negative for abdominal pain, constipation and heartburn. Genitourinary: Negative for dysuria, frequency and urgency. Musculoskeletal: Negative for back pain and joint pain. Neurological: Negative for dizziness, tingling and headaches. Endo/Heme/Allergies: Negative for environmental allergies. Psychiatric/Behavioral: Negative for depression. The patient does not have insomnia. Physical Exam  
Constitutional: She appears well-developed and well-nourished. /65 (BP 1 Location: Right arm, BP Patient Position: Sitting)   Pulse 80   Temp 97.6 °F (36.4 °C) (Oral)   Resp 16   Ht 5' 2\" (1.575 m)   Wt 201 lb 6.4 oz (91.4 kg)   LMP  (LMP Unknown)   SpO2 98%   BMI 36.84 kg/m² HENT:  
Right Ear: Tympanic membrane and ear canal normal.  
Left Ear: Tympanic membrane and ear canal normal.  
Nose: No mucosal edema or rhinorrhea. Mouth/Throat: Oropharynx is clear and moist and mucous membranes are normal.  
Neck: Normal range of motion. Neck supple. No thyromegaly present. Cardiovascular: Normal rate and regular rhythm. No murmur heard. Pulmonary/Chest: Effort normal and breath sounds normal.  
Abdominal: Soft. Bowel sounds are normal. There is no tenderness. Musculoskeletal: Normal range of motion. She exhibits no edema. Lymphadenopathy:  
  She has no cervical adenopathy. Skin: Skin is warm and dry. Psychiatric: She has a normal mood and affect. Nursing note and vitals reviewed. ASSESSMENT and PLAN Diagnoses and all orders for this visit: 1. Essential hypertension Stable 2. Uncontrolled type 1 diabetes mellitus with retinopathy of both eyes ([address] -     AMB POC HEMOGLOBIN A1C 
-     AMB POC GLUCOSE, QUANTITATIVE, BLOOD 
      -     flash glucose sensor (FREESTYLE JALEEL 14 DAY SENSOR) kit; USE TO MONITOR BLOOD SUGAR, E10.319 
-     flash glucose scanning reader (FREESTYLE JALEEL 14 DAY READER) misc; USE TO MONITOR BLOOD SUGAR , E10.319 
-     insulin glargine U-300 conc (TOUJEO MAX U-300 SOLOSTAR) 300 unit/mL (3 mL) inpn; 105 Units by SubCUTAneous route daily. 3. Mixed hyperlipidemia -     LIPID PANEL 
 
4. Obstructive sleep apnea syndrome Stable on CPAP 5. Non morbid obesity I have reviewed/discussed the above normal BMI with the patient. I have recommended the following interventions: dietary management education, guidance, and counseling . 6. B12 deficiency -     Refill cyanocobalamin (VITAMIN B12) 1,000 mcg/mL injection; 1 mL by IntraMUSCular route every thirty (30) days. 7. Encounter for medication monitoring// 
8. Encounter for long-term (current) use of insulin ([address] -     METABOLIC PANEL, BASIC 9. Colon cancer screening 
-     REFERRAL TO GASTROENTEROLOGY Follow-up and Dispositions · Return in about 3 months (around 7/8/2019).  
  
 
reviewed diet, exercise and weight control 
cardiovascular risk and specific lipid/LDL goals reviewed 
reviewed medications and side effects in detail 
specific diabetic recommendations: low cholesterol diet, weight control and daily exercise discussed, home glucose monitoring emphasized, all medications, side effects and compliance discussed carefully, foot care discussed and Podiatry visits discussed, annual eye examinations at Ophthalmology discussed and glycohemoglobin and other lab monitoring discussed I have discussed diagnosis listed in this note with pt and/or family. I have discussed treatment plans and options and the risk/benefit analysis of those options, including safe use of medications and possible medication side effects. Through the use of shared decision making we have agreed to the above plan. The patient has received an after-visit summary and questions were answered concerning future plans and follow up. Advise pt of any urgent changes then to proceed to the ER.

## 2019-03-29 NOTE — PROGRESS NOTES
Chief Complaint Patient presents with  Diabetes 2 month follow up 1. Have you been to the ER, urgent care clinic since your last visit? Hospitalized since your last visit? NO 
 
2. Have you seen or consulted any other health care providers outside of the [address] [place] [address] since your last visit? Include any pap smears or colon screening. NO Mammogram: 6/6/18 Colonoscopy:  
Bone density: 
 
Eye Exam: 02/2019  Dr. Edna [facility]line[facility] of [place],

## 2019-03-30 LAB
BUN SERPL-MCNC: 14 MG/DL (ref 6–24)
BUN/CREAT SERPL: 12 (ref 9–23)
CALCIUM SERPL-MCNC: 9.5 MG/DL (ref 8.7–10.2)
CHLORIDE SERPL-SCNC: 107 MMOL/L (ref 96–106)
CHOLEST SERPL-MCNC: 192 MG/DL (ref 100–199)
CO2 SERPL-SCNC: 21 MMOL/L (ref 20–29)
CREAT SERPL-MCNC: 1.19 MG/DL (ref 0.57–1)
GLUCOSE SERPL-MCNC: 135 MG/DL (ref 65–99)
HDLC SERPL-MCNC: 59 MG/DL
INTERPRETATION, 910389: NORMAL
INTERPRETATION: NORMAL
LDLC SERPL CALC-MCNC: 116 MG/DL (ref 0–99)
PDF IMAGE, 910387: NORMAL
POTASSIUM SERPL-SCNC: 4.4 MMOL/L (ref 3.5–5.2)
SODIUM SERPL-SCNC: 145 MMOL/L (ref 134–144)
TRIGL SERPL-MCNC: 83 MG/DL (ref 0–149)
VLDLC SERPL CALC-MCNC: 17 MG/DL (ref 5–40)

## 2019-04-11 RX ORDER — IBUPROFEN 800 MG/1
TABLET ORAL
Qty: 60 TAB | Refills: 1 | Status: SHIPPED | OUTPATIENT
Start: 2019-04-11 | End: 2019-06-16 | Stop reason: SDUPTHER

## 2019-04-11 NOTE — TELEPHONE ENCOUNTER
Request for Ibuprofen 800mg twice a day PRN. Last office visit 3/29/19, next office visit 7/9/19. Refill pended for provider approval.  Patients current MD not in office. Last refill send on 6/4/18 with 1 refills.

## 2019-06-16 RX ORDER — IBUPROFEN 800 MG/1
TABLET ORAL
Qty: 60 TAB | Refills: 0 | Status: SHIPPED | OUTPATIENT
Start: 2019-06-16 | End: 2019-06-17 | Stop reason: SDUPTHER

## 2019-06-17 ENCOUNTER — OFFICE VISIT (OUTPATIENT)
Dept: FAMILY MEDICINE CLINIC | Age: 54
End: 2019-06-17

## 2019-06-17 VITALS
HEART RATE: 88 BPM | DIASTOLIC BLOOD PRESSURE: 70 MMHG | HEIGHT: 62 IN | WEIGHT: 200.6 LBS | SYSTOLIC BLOOD PRESSURE: 146 MMHG | BODY MASS INDEX: 36.91 KG/M2 | OXYGEN SATURATION: 97 % | TEMPERATURE: 98.5 F | RESPIRATION RATE: 16 BRPM

## 2019-06-17 DIAGNOSIS — J20.8 VIRAL BRONCHITIS: ICD-10-CM

## 2019-06-17 DIAGNOSIS — J01.90 ACUTE SINUSITIS, RECURRENCE NOT SPECIFIED, UNSPECIFIED LOCATION: Primary | ICD-10-CM

## 2019-06-17 RX ORDER — BENZONATATE 200 MG/1
200 CAPSULE ORAL
Qty: 21 CAP | Refills: 0 | Status: SHIPPED | OUTPATIENT
Start: 2019-06-17 | End: 2019-06-24

## 2019-06-17 RX ORDER — PREDNISONE 20 MG/1
20 TABLET ORAL 2 TIMES DAILY
Qty: 10 TAB | Refills: 0 | Status: SHIPPED | OUTPATIENT
Start: 2019-06-17 | End: 2019-06-22

## 2019-06-17 RX ORDER — ALBUTEROL SULFATE 90 UG/1
2 AEROSOL, METERED RESPIRATORY (INHALATION)
Qty: 1 INHALER | Refills: 0 | Status: SHIPPED | OUTPATIENT
Start: 2019-06-17 | End: 2019-06-30 | Stop reason: SDUPTHER

## 2019-06-17 RX ORDER — FLUTICASONE PROPIONATE 50 MCG
SPRAY, SUSPENSION (ML) NASAL
Qty: 1 BOTTLE | Refills: 0 | Status: SHIPPED | OUTPATIENT
Start: 2019-06-17 | End: 2019-07-14 | Stop reason: SDUPTHER

## 2019-06-17 RX ORDER — AMOXICILLIN AND CLAVULANATE POTASSIUM 875; 125 MG/1; MG/1
1 TABLET, FILM COATED ORAL 2 TIMES DAILY
Qty: 20 TAB | Refills: 0 | Status: SHIPPED | OUTPATIENT
Start: 2019-06-17 | End: 2019-06-27

## 2019-06-17 RX ORDER — MINERAL OIL
180 ENEMA (ML) RECTAL
Qty: 30 TAB | Refills: 0 | Status: SHIPPED | OUTPATIENT
Start: 2019-06-17 | End: 2019-07-14 | Stop reason: SDUPTHER

## 2019-06-17 NOTE — PATIENT INSTRUCTIONS
Sinusitis: Care Instructions  Your Care Instructions    Sinusitis is an infection of the lining of the sinus cavities in your head. Sinusitis often follows a cold. It causes pain and pressure in your head and face. In most cases, sinusitis gets better on its own in 1 to 2 weeks. But some mild symptoms may last for several weeks. Sometimes antibiotics are needed. Follow-up care is a key part of your treatment and safety. Be sure to make and go to all appointments, and call your doctor if you are having problems. It's also a good idea to know your test results and keep a list of the medicines you take. How can you care for yourself at home? · Take an over-the-counter pain medicine, such as acetaminophen (Tylenol), ibuprofen (Advil, Motrin), or naproxen (Aleve). Read and follow all instructions on the label. · If the doctor prescribed antibiotics, take them as directed. Do not stop taking them just because you feel better. You need to take the full course of antibiotics. · Be careful when taking over-the-counter cold or flu medicines and Tylenol at the same time. Many of these medicines have acetaminophen, which is Tylenol. Read the labels to make sure that you are not taking more than the recommended dose. Too much acetaminophen (Tylenol) can be harmful. · Breathe warm, moist air from a steamy shower, a hot bath, or a sink filled with hot water. Avoid cold, dry air. Using a humidifier in your home may help. Follow the directions for cleaning the machine. · Use saline (saltwater) nasal washes to help keep your nasal passages open and wash out mucus and bacteria. You can buy saline nose drops at a grocery store or drugstore. Or you can make your own at home by adding 1 teaspoon of salt and 1 teaspoon of baking soda to 2 cups of distilled water. If you make your own, fill a bulb syringe with the solution, insert the tip into your nostril, and squeeze gently. Angely Fuad your nose.   · Put a hot, wet towel or a warm gel pack on your face 3 or 4 times a day for 5 to 10 minutes each time. · Try a decongestant nasal spray like oxymetazoline (Afrin). Do not use it for more than 3 days in a row. Using it for more than 3 days can make your congestion worse. When should you call for help? Call your doctor now or seek immediate medical care if:    · You have new or worse swelling or redness in your face or around your eyes.     · You have a new or higher fever.    Watch closely for changes in your health, and be sure to contact your doctor if:    · You have new or worse facial pain.     · The mucus from your nose becomes thicker (like pus) or has new blood in it.     · You are not getting better as expected. Where can you learn more? Go to http://lópez-shiraz.info/. Enter L838 in the search box to learn more about \"Sinusitis: Care Instructions. \"  Current as of: March 27, 2018  Content Version: 11.9  © 1966-0765 [employer]. Care instructions adapted under license by [facility] (which disclaims liability or warranty for this information). If you have questions about a medical condition or this instruction, always ask your healthcare professional. [place] [address] any warranty or liability for your use of this information.

## 2019-06-17 NOTE — LETTER
NOTIFICATION RETURN TO WORK / SCHOOL 
 
6/17/2019 3:36 PM 
 
Ms. Zay Pettit [address] [facility] [id number] To Whom It May Concern: Zay Pettit is currently under the care of [facility]. She will return to work/school on: Thursday, June 20, 2019. If there are questions or concerns please have the patient contact our office. Sincerely, Liam Colbert NP

## 2019-06-17 NOTE — PROGRESS NOTES
HISTORY OF PRESENT ILLNESS  Saúl العلي is a 48 y.o. female. HPI  Patient of Dr. Jhony Garcia with PMhx significant for hypertension, hyperlipidemia, diabetes here today for an acute visit with chief complaint of   Her symptoms started 4 days ago with a scratchy throat that progressed to chest congestion, non-productive cough, along with runny nose and sneezing. She has a \"pounding head\" and feels like she has difficulty catching her breath and wheezing from time to time. She is a non-smoker. She denies history of underlying lung disease. She denies any known sick contacts. No fevers. She has tried OTC Advil Cold/Sinus and Robitussin for cough, which did not help. Visit Vitals  /70   Pulse 88   Temp 98.5 °F (36.9 °C) (Oral)   Resp 16   Ht 5' 2\" (1.575 m)   Wt 200 lb 9.6 oz (91 kg)   LMP  (LMP Unknown)   SpO2 97%   BMI 36.69 kg/m²     Current Outpatient Medications on File Prior to Visit   Medication Sig Dispense Refill    ezetimibe (ZETIA) 10 mg tablet TAKE 1 TABLET BY MOUTH DAILY 30 Tab 11    topiramate (TOPAMAX) 50 mg tablet TAKE 1 TABLET BY MOUTH TWICE DAILY 60 Tab 3    atorvastatin (LIPITOR) 80 mg tablet TAKE 1 TABLET BY MOUTH DAILY 30 Tab 11    cyanocobalamin (VITAMIN B12) 1,000 mcg/mL injection 1 mL by IntraMUSCular route every thirty (30) days. 1 Vial 6    flash glucose scanning reader (FREESTYLE JALEEL 14 DAY READER) [facility] USE TO MONITOR BLOOD SUGAR , E10.319 1 Each 4    flash glucose sensor (FREESTYLE JALEEL 14 DAY SENSOR) kit USE TO MONITOR BLOOD SUGAR, E10.319 1 Kit 4    insulin glargine U-300 conc (TOUJEO MAX U-300 SOLOSTAR) 300 unit/mL (3 mL) inpn 105 Units by SubCUTAneous route daily. 12 Syringe 3    insulin lispro (HUMALOG) 100 unit/mL kwikpen by SubCUTAneous route.  For Blood Sugar 8-150 use 10 units, 151-200 use 15 units, 201-250 use 20 units, 251-300 use 25 units and >301 use 30 units   Indications: type 2 diabetes mellitus      FREESTYLE JALEEL 10 DAY SENSOR kit USE TO MONITOR BLOOD SUAGR 3 Kit 0    pantoprazole (PROTONIX) 40 mg tablet TAKE 1 TABLET BY MOUTH ONCE DAILY 30 Tab 11    lisinopril (PRINIVIL, ZESTRIL) 20 mg tablet Take 2 Tabs by mouth daily. 180 Tab 1    Insulin Needles, Disposable, (PER PEN NEEDLE) 32 gauge x 5/32\" ndle Use to inject insulin 4 times daily as directed. 100 Pen Needle 11    flash glucose scanning reader (DxNASTYLE JALEEL 10 DAY READER) misc Use to moniotr BS 3 Each 3    ibuprofen (MOTRIN) 800 mg tablet TAKE 1 TABLET BY MOUTH TWICE DAILY AS NEEDED FOR PAIN 60 Tab 1    linaclotide (LINZESS) 290 mcg cap capsule Take 290 mcg by mouth Daily (before breakfast).  [DISCONTINUED] ibuprofen (MOTRIN) 800 mg tablet TAKE 1 TABLET BY MOUTH TWICE DAILY AS NEEDED FOR PAIN 60 Tab 0     No current facility-administered medications on file prior to visit. Review of Systems   Constitutional: Negative for chills, fever and malaise/fatigue. HENT: Positive for congestion and sinus pain. Negative for ear discharge, ear pain and sore throat. Eyes: Negative for blurred vision, double vision, pain and discharge. Respiratory: Positive for cough, sputum production and wheezing. Negative for shortness of breath. Cardiovascular: Negative for chest pain and palpitations. Neurological: Negative for weakness and headaches. Physical Exam   Constitutional: She is oriented to person, place, and time. She appears well-developed and well-nourished. No distress. HENT:   Head: Normocephalic and atraumatic. Right Ear: External ear normal.   Left Ear: External ear normal.   Nose: Mucosal edema and rhinorrhea present. Right sinus exhibits maxillary sinus tenderness and frontal sinus tenderness. Left sinus exhibits maxillary sinus tenderness and frontal sinus tenderness. Mouth/Throat: Uvula is midline and mucous membranes are normal. Posterior oropharyngeal erythema present.  No oropharyngeal exudate or posterior oropharyngeal edema.   + PND   Eyes: Pupils are equal, round, and reactive to light. Conjunctivae and EOM are normal. Right eye exhibits no discharge. Left eye exhibits no discharge. Neck: Neck supple. Cardiovascular: Normal rate, regular rhythm and normal heart sounds. Pulmonary/Chest: Effort normal. No tachypnea. No respiratory distress. She has no decreased breath sounds. She has no rhonchi. She has no rales. Mild scattered expiratory wheezing; no other adventitious sounds noted   Lymphadenopathy:     She has no cervical adenopathy. Neurological: She is alert and oriented to person, place, and time. Skin: Skin is warm and dry. She is not diaphoretic. Nursing note and vitals reviewed. ASSESSMENT and PLAN    ICD-10-CM ICD-9-CM    1. Acute sinusitis, recurrence not specified, unspecified location J01.90 461.9 predniSONE (DELTASONE) 20 mg tablet      benzonatate (TESSALON) 200 mg capsule      albuterol (PROVENTIL HFA, VENTOLIN HFA, PROAIR HFA) 90 mcg/actuation inhaler      fluticasone propionate (FLONASE) 50 mcg/actuation nasal spray      fexofenadine (ALLEGRA) 180 mg tablet      amoxicillin-clavulanate (AUGMENTIN) 875-125 mg per tablet   2. Viral bronchitis J20.8 466.0 predniSONE (DELTASONE) 20 mg tablet      benzonatate (TESSALON) 200 mg capsule      albuterol (PROVENTIL HFA, VENTOLIN HFA, PROAIR HFA) 90 mcg/actuation inhaler      fluticasone propionate (FLONASE) 50 mcg/actuation nasal spray      fexofenadine (ALLEGRA) 180 mg tablet     1. Sinusitis with bronchitis -  - Treat symptomatically now with prednisone 20mg BID x 5 days (reports glucose of 120 this morning), tessalon for cough, albuterol inhaler PRN, and flonase/allegra for rhinitis symptoms. Rx provided for augmentin to be started 6/20/19 if symptoms have not improved. Work note provided. Avoid OTC cough/cold medications. Follow up if symptoms worsen or fail to improve, and with PCP as scheduled for routine care.      Follow-up and Dispositions    · Return if symptoms worsen or fail to improve.

## 2019-06-17 NOTE — PROGRESS NOTES
Chief Complaint   Patient presents with    Cough    Nasal Congestion    Shortness of Breath    Sore Throat     1. Have you been to the ER, urgent care clinic since your last visit? Hospitalized since your last visit? No    2. Have you seen or consulted any other health care providers outside of the [address] [place] [address] since your last visit? Include any pap smears or colon screening. No   Patient here for sinus pain and pressure, nasal congestion, chest tightness and scratchy throat. OTC meds - Advil sinus and cold, Robitussin cough medicine.

## 2019-07-05 ENCOUNTER — TELEPHONE (OUTPATIENT)
Dept: FAMILY MEDICINE CLINIC | Age: 54
End: 2019-07-05

## 2019-07-05 NOTE — TELEPHONE ENCOUNTER
Hello     I am still experiencing a deep to heavy  cough and little wheezing with it as well. I am still using the inhaler but there is no relief at this time. What would you suggest or is there anything else you can prescribe.  She can be reached @ [phone]

## 2019-07-08 RX ORDER — PREDNISONE 20 MG/1
20 TABLET ORAL 2 TIMES DAILY
Qty: 6 TAB | Refills: 0 | Status: SHIPPED | OUTPATIENT
Start: 2019-07-08 | End: 2019-07-11

## 2019-07-08 RX ORDER — BENZONATATE 200 MG/1
200 CAPSULE ORAL
Qty: 21 CAP | Refills: 0 | Status: SHIPPED | OUTPATIENT
Start: 2019-07-08 | End: 2019-07-15

## 2019-07-08 NOTE — TELEPHONE ENCOUNTER
Rx's sent as requested. If no improvement within a few days, please advise her to be re-evaluated. Thanks.   Cecil Cabral NP

## 2019-07-08 NOTE — TELEPHONE ENCOUNTER
Patient has a training class tomorrow and unable to accept appointment. Patient has a follow up with pcp on 7/17/19 and will call office if unable to wait. Patient requests that prescription for tessalon and prednisone be sent to pharmacy on file as offered.

## 2019-07-08 NOTE — TELEPHONE ENCOUNTER
Patient still experiencing cough and wheezing. Patient states using inhaler. Patient notified that per last note on 6/17/19 patient was instructed to return to office if not better or if symptoms worsen. Patient states unable to come today as she is at work and is leaving to go out of town on Wednesday. Will review with Elizabeth Black NP for advisement and contact patient with instructions.

## 2019-07-14 RX ORDER — IBUPROFEN 800 MG/1
TABLET ORAL
Qty: 60 TAB | Refills: 0 | Status: SHIPPED | OUTPATIENT
Start: 2019-07-14 | End: 2019-08-11 | Stop reason: SDUPTHER

## 2019-08-11 RX ORDER — IBUPROFEN 800 MG/1
TABLET ORAL
Qty: 60 TAB | Refills: 0 | Status: SHIPPED | OUTPATIENT
Start: 2019-08-11 | End: 2019-09-13 | Stop reason: SDUPTHER

## 2019-08-22 RX ORDER — FLASH GLUCOSE SENSOR
KIT MISCELLANEOUS
Qty: 1 KIT | Refills: 0 | Status: SHIPPED | OUTPATIENT
Start: 2019-08-22 | End: 2020-05-12 | Stop reason: SDUPTHER

## 2019-09-03 DIAGNOSIS — J01.90 ACUTE SINUSITIS, RECURRENCE NOT SPECIFIED, UNSPECIFIED LOCATION: ICD-10-CM

## 2019-09-03 DIAGNOSIS — J20.8 VIRAL BRONCHITIS: ICD-10-CM

## 2019-09-03 RX ORDER — FLASH GLUCOSE SENSOR
KIT MISCELLANEOUS
Qty: 1 KIT | Refills: 0 | Status: SHIPPED | OUTPATIENT
Start: 2019-09-03 | End: 2019-09-20 | Stop reason: SDUPTHER

## 2019-09-03 RX ORDER — ALBUTEROL SULFATE 90 UG/1
AEROSOL, METERED RESPIRATORY (INHALATION)
Qty: 18 G | Refills: 3 | Status: SHIPPED | OUTPATIENT
Start: 2019-09-03 | End: 2019-10-28 | Stop reason: SDUPTHER

## 2019-09-13 RX ORDER — IBUPROFEN 800 MG/1
TABLET ORAL
Qty: 60 TAB | Refills: 0 | Status: SHIPPED | OUTPATIENT
Start: 2019-09-13 | End: 2019-09-20 | Stop reason: SDUPTHER

## 2019-09-20 ENCOUNTER — OFFICE VISIT (OUTPATIENT)
Dept: FAMILY MEDICINE CLINIC | Age: 54
End: 2019-09-20

## 2019-09-20 VITALS
HEIGHT: 62 IN | RESPIRATION RATE: 18 BRPM | TEMPERATURE: 97.2 F | WEIGHT: 202.6 LBS | BODY MASS INDEX: 37.28 KG/M2 | SYSTOLIC BLOOD PRESSURE: 146 MMHG | DIASTOLIC BLOOD PRESSURE: 78 MMHG | HEART RATE: 80 BPM | OXYGEN SATURATION: 97 %

## 2019-09-20 DIAGNOSIS — Z23 ENCOUNTER FOR IMMUNIZATION: ICD-10-CM

## 2019-09-20 DIAGNOSIS — Z12.31 ENCOUNTER FOR SCREENING MAMMOGRAM FOR BREAST CANCER: ICD-10-CM

## 2019-09-20 DIAGNOSIS — E10.319 TYPE 1 DIABETES MELLITUS WITH RETINOPATHY OF BOTH EYES, MACULAR EDEMA PRESENCE UNSPECIFIED, UNSPECIFIED RETINOPATHY SEVERITY (HCC): ICD-10-CM

## 2019-09-20 DIAGNOSIS — E78.2 MIXED HYPERLIPIDEMIA: ICD-10-CM

## 2019-09-20 DIAGNOSIS — Z51.81 ENCOUNTER FOR MEDICATION MONITORING: ICD-10-CM

## 2019-09-20 DIAGNOSIS — E53.8 B12 DEFICIENCY: ICD-10-CM

## 2019-09-20 DIAGNOSIS — I10 ESSENTIAL HYPERTENSION: Primary | ICD-10-CM

## 2019-09-20 DIAGNOSIS — E66.9 NON MORBID OBESITY: ICD-10-CM

## 2019-09-20 DIAGNOSIS — Z79.4 ENCOUNTER FOR LONG-TERM (CURRENT) USE OF INSULIN (HCC): ICD-10-CM

## 2019-09-20 DIAGNOSIS — L02.422 FURUNCLE OF LEFT AXILLA: ICD-10-CM

## 2019-09-20 PROBLEM — E11.21 TYPE 2 DIABETES WITH NEPHROPATHY (HCC): Status: ACTIVE | Noted: 2019-09-20

## 2019-09-20 LAB
BILIRUB UR QL STRIP: NEGATIVE
GLUCOSE POC: 132 MG/DL
GLUCOSE UR-MCNC: NEGATIVE MG/DL
HBA1C MFR BLD HPLC: 10 %
KETONES P FAST UR STRIP-MCNC: NORMAL MG/DL
PH UR STRIP: 6 [PH] (ref 4.6–8)
PROT UR QL STRIP: NEGATIVE
SP GR UR STRIP: 1.02 (ref 1–1.03)
UA UROBILINOGEN AMB POC: NORMAL (ref 0.2–1)
URINALYSIS CLARITY POC: CLEAR
URINALYSIS COLOR POC: YELLOW
URINE BLOOD POC: NEGATIVE
URINE LEUKOCYTES POC: NEGATIVE
URINE NITRITES POC: NEGATIVE

## 2019-09-20 RX ORDER — INSULIN LISPRO 100 [IU]/ML
INJECTION, SOLUTION INTRAVENOUS; SUBCUTANEOUS
Qty: 1 PACKAGE | Refills: 0
Start: 2019-09-20 | End: 2021-07-08

## 2019-09-20 RX ORDER — FUROSEMIDE 20 MG/1
20 TABLET ORAL DAILY
Qty: 30 TAB | Refills: 6 | Status: SHIPPED | OUTPATIENT
Start: 2019-09-20 | End: 2021-08-18 | Stop reason: ALTCHOICE

## 2019-09-20 RX ORDER — SULFAMETHOXAZOLE AND TRIMETHOPRIM 800; 160 MG/1; MG/1
1 TABLET ORAL 2 TIMES DAILY
Qty: 20 TAB | Refills: 0 | Status: SHIPPED | OUTPATIENT
Start: 2019-09-20 | End: 2019-09-30

## 2019-09-20 RX ORDER — CYANOCOBALAMIN 1000 UG/ML
1000 INJECTION, SOLUTION INTRAMUSCULAR; SUBCUTANEOUS ONCE
Qty: 1 ML | Refills: 0
Start: 2019-09-20 | End: 2019-09-20

## 2019-09-20 RX ORDER — CYANOCOBALAMIN 1000 UG/ML
1000 INJECTION, SOLUTION INTRAMUSCULAR; SUBCUTANEOUS
Qty: 1 VIAL | Refills: 6 | Status: SHIPPED | OUTPATIENT
Start: 2019-09-20 | End: 2020-08-27

## 2019-09-20 NOTE — PROGRESS NOTES
Chief Complaint   Patient presents with    Diabetes     3 m [facility] follow up     Mammogram  6/6/2018    Colonoscopy 8/21/19 [place]    A1C  3/29/19    Eye Exam  5/29/19  [facility]    PAP  9/13/17      1. Have you been to the ER, urgent care clinic since your last visit? Hospitalized since your last visit? NO    2. Have you seen or consulted any other health care providers outside of the [address] [place] [address] since your last visit? Include any pap smears or colon screening. NO    Order placed for Flu Shot & B 12 per Verbal Order from Dr. Tammy Jimenes on 9/20/2019 due to need. Flu shot 0.5 ml given in left arm IM, no reaction noted. B 12 1000 mcg given in right arm IM, no reaction noted.

## 2019-09-20 NOTE — PROGRESS NOTES
HISTORY OF PRESENT ILLNESS  Marv Leblanc is a 48 y.o. female. HPI   Follow up on chronic medical problems. Overall has been feeling well. Cardiovascular Review:  The patient has hypertension and hyperlipidemia. Diet and Lifestyle: generally follows a low fat low cholesterol diet, generally follows a low sodium diet, exercises sporadically, nonsmoker  Home BP Monitoring: is not measured at home. Pertinent ROS: taking medications as instructed, no medication side effects noted, no TIA's, no chest pain on exertion, no dyspnea on exertion, no swelling of ankles, no palpitations, no muscle aches or pain. Diabetes Mellitus:  She has diabetes mellitus type 1 dx at the age of 25. She has been on insulin since her diagnoses. She has retinopathy. Diabetic ROS - medication compliance: compliant most of the time, diabetic diet compliance: compliant most of the time, home glucose monitoring: is performed regularly, check BS 3 to 4 times in a day for SS humalog insulin, she states that her BS are low sometimes in the morning in the 60s. She gets up and eats crackers and drinks OJ to help her BS come back up. Her food choices have been better. She has not been exercising. Further diabetic ROS: no chest pain, dyspnea or TIA's, no numbness, tingling or pain in extremities. Lab review: orders written for new lab studies as appropriate; see orders. Patient Active Problem List   Diagnosis Code    Gastroesophageal reflux disease without esophagitis K21.9    History of hysterectomy for benign disease Z90.710    Diabetic retinopathy ([address] E11.319    Type 1 diabetes mellitus with retinopathy of right eye without macular edema ([address] E10.319    Encounter for long-term (current) use of insulin ([address] Z79.4    Encounter for medication monitoring Z51.81    Essential hypertension I10    Mixed hyperlipidemia E78.2    Chronic constipation K59.09    Severe obesity (BMI 35.0-39. 9) with comorbidity ([address] E66.01  Uncontrolled type 1 diabetes mellitus with retinopathy of both eyes ([facility] [address] E10.319, E10.65    Type 2 diabetes with nephropathy (HCC) E11.21       Current Outpatient Medications   Medication Sig Dispense Refill    cyanocobalamin (VITAMIN B12) 1,000 mcg/mL injection 1 mL by IntraMUSCular route every thirty (30) days. 1 Vial 6    insulin lispro (HUMALOG) 100 unit/mL kwikpen For Blood Sugar  use 12 units, 151-200 use 18 units, 201-250 use 22 units, 251-300 use 28units and >301 use 32 units  Indications: type 2 diabetes mellitus 1 Package 0    furosemide (LASIX) 20 mg tablet Take 1 Tab by mouth daily. 30 Tab 6    cyanocobalamin (VITAMIN B-12) 1,000 mcg/mL injection 1 mL by IntraMUSCular route once for 1 dose. 1 mL 0    VENTOLIN HFA 90 mcg/actuation inhaler INHALE 2 PUFFS BY MOUTH EVERY 4 HOURS AS NEEDED FOR WHEEZING OR SHORTNESS OF BREATH 18 g 3    FREESTYLE JALEEL 14 DAY SENSOR kit USE TO MONITOR BLOOD SUGAR 1 Kit 0    fexofenadine (ALLEGRA) 180 mg tablet TAKE 1 TABLET BY MOUTH DAILY AS NEEDED FOR ALLERGIES OR RHINITIS 30 Tab 6    fluticasone propionate (FLONASE) 50 mcg/actuation nasal spray INSTILL 2 SPRAYS IN EACH NOSTRIL ONCE DAILY AS NEEDED FOR NASAL CONGESTION 1 Bottle 6    ezetimibe (ZETIA) 10 mg tablet TAKE 1 TABLET BY MOUTH DAILY 30 Tab 11    atorvastatin (LIPITOR) 80 mg tablet TAKE 1 TABLET BY MOUTH DAILY 30 Tab 11    flash glucose scanning reader (FREESTYLE JALEEL 14 DAY READER) [facility] USE TO MONITOR BLOOD SUGAR , E10.319 1 Each 4    insulin glargine U-300 conc (TOUJEO MAX U-300 SOLOSTAR) 300 unit/mL (3 mL) inpn 105 Units by SubCUTAneous route daily. 12 Syringe 3    pantoprazole (PROTONIX) 40 mg tablet TAKE 1 TABLET BY MOUTH ONCE DAILY 30 Tab 11    lisinopril (PRINIVIL, ZESTRIL) 20 mg tablet Take 2 Tabs by mouth daily. 180 Tab 1    Insulin Needles, Disposable, (PER PEN NEEDLE) 32 gauge x 5/32\" ndle Use to inject insulin 4 times daily as directed.  100 Pen Needle 11    flash glucose scanning reader ([employer] JALEEL 10 DAY READER) misc Use to moniotr BS 3 Each 3    ibuprofen (MOTRIN) 800 mg tablet TAKE 1 TABLET BY MOUTH TWICE DAILY AS NEEDED FOR PAIN 60 Tab 1    linaclotide (LINZESS) 290 mcg cap capsule Take 290 mcg by mouth Daily (before breakfast).          Allergies   Allergen Reactions    Percocet [Oxycodone-Acetaminophen] Other (comments)     AMS       Past Medical History:   Diagnosis Date    Diabetes ([address]     Diabetic retinopathy ([facility]     right eye     Gestational diabetes     Hypercholesterolemia     Hypertension     Type 1 diabetes ([facility] [facility]        Past Surgical History:   Procedure Laterality Date    HX BREAST REDUCTION Bilateral 1984    HX CATARACT REMOVAL  2018    left eye    HX  SECTION  1990    HX  SECTION  2000    HX COLONOSCOPY      HX HEENT  2016    right eye surgery- repaired blood vessel    HX HEENT  2017    left eye surgery- removal of scar tissue    HX HEENT  2018    left eye-laser    HX HEENT  10/23/2018    left eye injection    HX HEENT  2018    left eye intravitreal injection    HX HEENT  2019    left eye intravitreal injection    HX HYSTERECTOMY      partial       Family History   Problem Relation Age of Onset    Hypertension Mother     Diabetes Father     No Known Problems Brother     Suicide Brother     Asthma Daughter     Asthma Son     Psoriasis Son        Social History     Tobacco Use    Smoking status: Never Smoker    Smokeless tobacco: Never Used   Substance Use Topics    Alcohol use: No     Alcohol/week: 0.0 standard drinks          Lab Results   Component Value Date/Time    WBC 6.2 2019 09:10 AM    HGB 12.8 2019 09:10 AM    HCT 40.3 2019 09:10 AM    PLATELET 168  09:10 AM    MCV 80 2019 09:10 AM     Lab Results   Component Value Date/Time    Cholesterol, total 192 2019 09:38 AM    HDL Cholesterol 59 2019 09:38 AM    LDL, calculated 116 (H) 03/29/2019 09:38 AM    Triglyceride 83 03/29/2019 09:38 AM    CHOL/HDL Ratio 3.1 02/25/2010 08:25 AM     Lab Results   Component Value Date/Time    TSH 0.942 05/16/2017 03:47 PM      Lab Results   Component Value Date/Time    Sodium 145 (H) 03/29/2019 09:38 AM    Potassium 4.4 03/29/2019 09:38 AM    Chloride 107 (H) 03/29/2019 09:38 AM    CO2 21 03/29/2019 09:38 AM    Anion gap 8 02/25/2010 08:25 AM    Glucose 135 (H) 03/29/2019 09:38 AM    BUN 14 03/29/2019 09:38 AM    Creatinine 1.19 (H) 03/29/2019 09:38 AM    BUN/Creatinine ratio 12 03/29/2019 09:38 AM    GFR est AA 60 03/29/2019 09:38 AM    GFR est non-AA 52 (L) 03/29/2019 09:38 AM    Calcium 9.5 03/29/2019 09:38 AM    Bilirubin, total <0.2 01/28/2019 09:10 AM    ALT (SGPT) 35 (H) 01/28/2019 09:10 AM    AST (SGOT) 36 01/28/2019 09:10 AM    Alk. phosphatase 151 (H) 01/28/2019 09:10 AM    Protein, total 6.9 01/28/2019 09:10 AM    Albumin 4.3 01/28/2019 09:10 AM    Globulin 3.3 02/25/2010 08:25 AM    A-G Ratio 1.7 01/28/2019 09:10 AM      Lab Results   Component Value Date/Time    Hemoglobin A1c 11.5 (H) 02/25/2010 08:25 AM    Hemoglobin A1c (POC) 9.4 03/29/2019 09:38 AM         Review of Systems   Constitutional: Negative for malaise/fatigue. HENT: Negative for congestion. Eyes: Negative for blurred vision. Respiratory: Negative for cough and shortness of breath. Cardiovascular: Negative for chest pain, palpitations and leg swelling. Gastrointestinal: Negative for abdominal pain, constipation and heartburn. Genitourinary: Negative for dysuria, frequency and urgency. Musculoskeletal: Negative for back pain and joint pain. Skin:        Boil under the left axilla over the past few days. Has some soreness. Neurological: Negative for dizziness, tingling and headaches. Endo/Heme/Allergies: Negative for environmental allergies. Psychiatric/Behavioral: Negative for depression. The patient does not have insomnia.         Physical Exam Constitutional: She appears well-developed and well-nourished. /78   Pulse 80   Temp 97.2 °F (36.2 °C) (Oral)   Resp 18   Ht 5' 2\" (1.575 m)   Wt 202 lb 9.6 oz (91.9 kg)   LMP  (LMP Unknown)   SpO2 97%   BMI 37.06 kg/m²    HENT:   Right Ear: Tympanic membrane and ear canal normal.   Left Ear: Tympanic membrane and ear canal normal.   Nose: No mucosal edema or rhinorrhea. Mouth/Throat: Oropharynx is clear and moist and mucous membranes are normal.   Neck: Normal range of motion. Neck supple. No thyromegaly present. Cardiovascular: Normal rate, regular rhythm and normal heart sounds. Exam reveals no gallop. Pulmonary/Chest: Effort normal and breath sounds normal.   Abdominal: Soft. Normal appearance and bowel sounds are normal. She exhibits no mass. There is no tenderness. Musculoskeletal: Normal range of motion. She exhibits no edema. Lymphadenopathy:     She has no cervical adenopathy. Skin: Skin is warm and dry. 2cm boil left axilla. Psychiatric: She has a normal mood and affect. Nursing note and vitals reviewed. ASSESSMENT and PLAN  Diagnoses and all orders for this visit:    1. Essential hypertension  -     Add furosemide (LASIX) 20 mg tablet; Take 1 Tab by mouth daily. Discussed sodium restriction, high k rich diet, maintaining ideal body weight and regular exercise program such as daily walking 30 min perday 4-5 times per week, as physiologic means to achieve blood pressure control.  Medication compliance advised. 2. Type 1 diabetes mellitus with retinopathy of both eyes, macular edema presence unspecified, unspecified retinopathy severity ([address]  a1c level is 10.0%. We discussed diet, stress reduction and increasing her exercise. She thinks that stress has been more than usual with her son being away in college.       -     AMB POC HEMOGLOBIN A1C  -     AMB POC GLUCOSE, QUANTITATIVE, BLOOD  -     MICROALBUMIN, UR, RAND W/ MICROALB/CREAT RATIO  -     AMB POC URINALYSIS DIP STICK AUTO W/O MICRO  -    Increase insulin lispro (HUMALOG) 100 unit/mL kwikpen; For Blood Sugar  use 12 units, 151-200 use 18 units, 201-250 use 22 units, 251-300 use 28units and >301 use 32 units  Indications: type 2 diabetes mellitus    3. Mixed hyperlipidemia  -     LIPID PANEL    4. B12 deficiency  -     cyanocobalamin (VITAMIN B12) 1,000 mcg/mL injection; 1 mL by IntraMUSCular route every thirty (30) days. -     VITAMIN B12 INJECTION  -     THER/PROPH/DIAG INJECTION, SUBCUT/IM  -     cyanocobalamin (VITAMIN B-12) 1,000 mcg/mL injection; 1 mL by IntraMUSCular route once for 1 dose. 5. Encounter for long-term (current) use of insulin (HCC)//  6. Encounter for medication monitoring  -     METABOLIC PANEL, COMPREHENSIVE    7. Non morbid obesity  I have reviewed/discussed the above normal BMI with the patient. I have recommended the following interventions: dietary management education, guidance, and counseling . Topamax did not work to help her with weight reduction. 8. Encounter for immunization  -     INFLUENZA VIRUS VAC QUAD,SPLIT,PRESV FREE SYRINGE IM  -     IL IMMUNIZ ADMIN,1 SINGLE/COMB VAC/TOXOID    9. Encounter for screening mammogram for breast cancer  -     [facility] SCREENING INCL CAD; Future    10. Boil in the left Axilla  Warm compresses  Bactrim DS twice a day for 10 days.       Follow-up and Dispositions    · Return in about 1 month        reviewed diet, exercise and weight control  cardiovascular risk and specific lipid/LDL goals reviewed  reviewed medications and side effects in detail  specific diabetic recommendations: low cholesterol diet, weight control and daily exercise discussed, home glucose monitoring emphasized, all medications, side effects and compliance discussed carefully, foot care discussed and Podiatry visits discussed, annual eye examinations at Ophthalmology discussed and glycohemoglobin and other lab monitoring discussed     I have discussed diagnosis listed in this note with pt and/or family. I have discussed treatment plans and options and the risk/benefit analysis of those options, including safe use of medications and possible medication side effects. Through the use of shared decision making we have agreed to the above plan. The patient has received an after-visit summary and questions were answered concerning future plans and follow up. Advise pt of any urgent changes then to proceed to the ER.

## 2019-09-21 LAB
ALBUMIN SERPL-MCNC: 4.2 G/DL (ref 3.5–5.5)
ALBUMIN/CREAT UR: 5.4 MG/G CREAT (ref 0–30)
ALBUMIN/GLOB SERPL: 1.9 {RATIO} (ref 1.2–2.2)
ALP SERPL-CCNC: 131 IU/L (ref 39–117)
ALT SERPL-CCNC: 22 IU/L (ref 0–32)
AST SERPL-CCNC: 14 IU/L (ref 0–40)
BILIRUB SERPL-MCNC: <0.2 MG/DL (ref 0–1.2)
BUN SERPL-MCNC: 10 MG/DL (ref 6–24)
BUN/CREAT SERPL: 9 (ref 9–23)
CALCIUM SERPL-MCNC: 9.4 MG/DL (ref 8.7–10.2)
CHLORIDE SERPL-SCNC: 102 MMOL/L (ref 96–106)
CHOLEST SERPL-MCNC: 168 MG/DL (ref 100–199)
CO2 SERPL-SCNC: 26 MMOL/L (ref 20–29)
CREAT SERPL-MCNC: 1.08 MG/DL (ref 0.57–1)
CREAT UR-MCNC: 252 MG/DL
GLOBULIN SER CALC-MCNC: 2.2 G/DL (ref 1.5–4.5)
GLUCOSE SERPL-MCNC: 128 MG/DL (ref 65–99)
HDLC SERPL-MCNC: 57 MG/DL
INTERPRETATION, 910389: NORMAL
INTERPRETATION: NORMAL
LDLC SERPL CALC-MCNC: 86 MG/DL (ref 0–99)
MICROALBUMIN UR-MCNC: 13.7 UG/ML
PDF IMAGE, 910387: NORMAL
POTASSIUM SERPL-SCNC: 4.1 MMOL/L (ref 3.5–5.2)
PROT SERPL-MCNC: 6.4 G/DL (ref 6–8.5)
SODIUM SERPL-SCNC: 144 MMOL/L (ref 134–144)
TRIGL SERPL-MCNC: 125 MG/DL (ref 0–149)
VLDLC SERPL CALC-MCNC: 25 MG/DL (ref 5–40)

## 2019-09-30 RX ORDER — FLASH GLUCOSE SENSOR
KIT MISCELLANEOUS
Qty: 1 KIT | Refills: 0 | Status: SHIPPED | OUTPATIENT
Start: 2019-09-30 | End: 2019-10-01 | Stop reason: SDUPTHER

## 2019-10-01 ENCOUNTER — TELEPHONE (OUTPATIENT)
Dept: FAMILY MEDICINE CLINIC | Age: 54
End: 2019-10-01

## 2019-10-01 DIAGNOSIS — E10.319 TYPE 1 DIABETES MELLITUS WITH RETINOPATHY OF BOTH EYES, MACULAR EDEMA PRESENCE UNSPECIFIED, UNSPECIFIED RETINOPATHY SEVERITY (HCC): Primary | ICD-10-CM

## 2019-10-01 NOTE — TELEPHONE ENCOUNTER
Patient states Furosemide caused hives after taking it for two days, she stopped medication, took benadryl , hives gone.

## 2019-10-01 NOTE — TELEPHONE ENCOUNTER
----- Message from Mary Yanez sent at 10/1/2019  2:16 PM EDT -----  Regarding: Jennifer/telephone  Pt stated the medication Furosemide is giving her hives. She is requesting a Rx for Sensor for the free stye and she has no Sensor left. She is requesting to know if you have an samples. Pts number is [phone] and Stephon Lynn number is [phone].

## 2019-10-01 NOTE — PROGRESS NOTES
Message from patient regarding mario 14 day sensors need rx for the 2 sensors (28 day supply) for the 14 day (not 10 day). Reviewed notes and recent PCP visit and follow up. Per Dr. Delroy Siu order sent.   Brook Joiner, PHARMD, CDE

## 2019-10-03 RX ORDER — HYDROCHLOROTHIAZIDE 25 MG/1
25 TABLET ORAL DAILY
Qty: 30 TAB | Refills: 3 | Status: SHIPPED | OUTPATIENT
Start: 2019-10-03 | End: 2020-01-06

## 2019-10-03 NOTE — TELEPHONE ENCOUNTER
Lasix caused hives.   Will try HCTZ but warned may be some cross reactivity with this medication and Lasix(sulfa component)

## 2019-10-21 RX ORDER — INSULIN GLARGINE 300 U/ML
INJECTION, SOLUTION SUBCUTANEOUS
Qty: 15 ML | Refills: 3 | Status: SHIPPED | OUTPATIENT
Start: 2019-10-21 | End: 2020-05-12 | Stop reason: SDUPTHER

## 2019-10-25 ENCOUNTER — HOSPITAL ENCOUNTER (OUTPATIENT)
Dept: MAMMOGRAPHY | Age: 54
Discharge: HOME | End: 2019-10-25
Attending: FAMILY MEDICINE
Payer: COMMERCIAL

## 2019-10-25 DIAGNOSIS — Z12.31 ENCOUNTER FOR SCREENING MAMMOGRAM FOR BREAST CANCER: ICD-10-CM

## 2019-10-25 PROCEDURE — 77067 SCR MAMMO BI INCL CAD: CPT

## 2019-10-25 RX ORDER — IBUPROFEN 800 MG/1
TABLET ORAL
Qty: 60 TAB | Refills: 1 | Status: SHIPPED | OUTPATIENT
Start: 2019-10-25 | End: 2020-07-27

## 2019-10-25 NOTE — TELEPHONE ENCOUNTER
Patient is requesting a RX refill ibuprofen (MOTRIN) 800 mg tablet she says it has been two weeks and RX was not ready her call back number is

## 2020-03-09 DIAGNOSIS — E66.9 NON MORBID OBESITY: ICD-10-CM

## 2020-03-09 RX ORDER — IBUPROFEN 800 MG/1
TABLET ORAL
Qty: 60 TAB | Refills: 1 | Status: SHIPPED | OUTPATIENT
Start: 2020-03-09 | End: 2020-05-05

## 2020-03-09 RX ORDER — TOPIRAMATE 50 MG/1
TABLET, FILM COATED ORAL
Qty: 60 TAB | Refills: 5 | Status: SHIPPED | OUTPATIENT
Start: 2020-03-09

## 2020-04-06 RX ORDER — ATORVASTATIN CALCIUM 80 MG/1
TABLET, FILM COATED ORAL
Qty: 30 TAB | Refills: 11 | Status: SHIPPED | OUTPATIENT
Start: 2020-04-06

## 2020-05-05 RX ORDER — IBUPROFEN 800 MG/1
TABLET ORAL
Qty: 60 TAB | Refills: 1 | Status: SHIPPED | OUTPATIENT
Start: 2020-05-05 | End: 2020-05-12 | Stop reason: SDUPTHER

## 2020-05-10 DIAGNOSIS — E10.319 TYPE 1 DIABETES MELLITUS WITH RETINOPATHY OF BOTH EYES, MACULAR EDEMA PRESENCE UNSPECIFIED, UNSPECIFIED RETINOPATHY SEVERITY (HCC): ICD-10-CM

## 2020-05-11 RX ORDER — FLASH GLUCOSE SENSOR
KIT MISCELLANEOUS
Qty: 1 KIT | Refills: 0 | Status: SHIPPED | OUTPATIENT
Start: 2020-05-11 | End: 2020-05-12 | Stop reason: SDUPTHER

## 2020-05-12 ENCOUNTER — TELEPHONE (OUTPATIENT)
Dept: FAMILY MEDICINE CLINIC | Age: 55
End: 2020-05-12

## 2020-05-12 ENCOUNTER — VIRTUAL VISIT (OUTPATIENT)
Dept: FAMILY MEDICINE CLINIC | Age: 55
End: 2020-05-12

## 2020-05-12 VITALS — BODY MASS INDEX: 34.57 KG/M2 | WEIGHT: 189 LBS

## 2020-05-12 DIAGNOSIS — Z79.4 ENCOUNTER FOR LONG-TERM (CURRENT) USE OF INSULIN (HCC): ICD-10-CM

## 2020-05-12 DIAGNOSIS — Z51.81 ENCOUNTER FOR MEDICATION MONITORING: ICD-10-CM

## 2020-05-12 DIAGNOSIS — E66.9 NON MORBID OBESITY: ICD-10-CM

## 2020-05-12 DIAGNOSIS — I10 ESSENTIAL HYPERTENSION: ICD-10-CM

## 2020-05-12 DIAGNOSIS — E78.2 MIXED HYPERLIPIDEMIA: ICD-10-CM

## 2020-05-12 DIAGNOSIS — E10.319 TYPE 1 DIABETES MELLITUS WITH RETINOPATHY OF BOTH EYES, MACULAR EDEMA PRESENCE UNSPECIFIED, UNSPECIFIED RETINOPATHY SEVERITY (HCC): Primary | ICD-10-CM

## 2020-05-12 DIAGNOSIS — K21.9 GASTROESOPHAGEAL REFLUX DISEASE, ESOPHAGITIS PRESENCE NOT SPECIFIED: ICD-10-CM

## 2020-05-12 DIAGNOSIS — E53.8 B12 DEFICIENCY: ICD-10-CM

## 2020-05-12 RX ORDER — INSULIN GLARGINE 300 U/ML
INJECTION, SOLUTION SUBCUTANEOUS
COMMUNITY
End: 2020-05-12 | Stop reason: ALTCHOICE

## 2020-05-12 RX ORDER — INSULIN GLARGINE 100 [IU]/ML
110 INJECTION, SOLUTION SUBCUTANEOUS
COMMUNITY
End: 2020-05-12 | Stop reason: SDUPTHER

## 2020-05-12 RX ORDER — FLASH GLUCOSE SENSOR
KIT MISCELLANEOUS
Qty: 7 KIT | Refills: 3 | Status: SHIPPED | OUTPATIENT
Start: 2020-05-12 | End: 2021-01-26 | Stop reason: SDUPTHER

## 2020-05-12 RX ORDER — INSULIN GLARGINE 100 [IU]/ML
110 INJECTION, SOLUTION SUBCUTANEOUS DAILY
Qty: 3 PEN | Refills: 3 | Status: SHIPPED | OUTPATIENT
Start: 2020-05-12 | End: 2021-07-02 | Stop reason: DRUGHIGH

## 2020-05-12 NOTE — PROGRESS NOTES
HISTORY OF PRESENT ILLNESS  Deepti Aguilar is a 47 y.o. female. HPI   Patient encounter by synchronous (real-time) audio-video technology which is patient-initiated. Patient is aware that this encounter is a billable service with coverage as determined by her insurance carrier, all discussed at the time of check-in. Patient has given verbal consent to proceed. Follow up on chronic medical problems. Overall has been feeling well. She is teleworking. Lives with her mother and her son. They have mostly been at home. She is taking the precautionary measures with sanitizing her home regularly. Wearing mask when she goes out to the drug store. Cardiovascular Review:  The patient has hypertension and hyperlipidemia. Diet and Lifestyle: generally follows a low fat low cholesterol diet, generally follows a low sodium diet, exercises sporadically, nonsmoker  Home BP Monitoring: is not measured at home. Pertinent ROS: taking medications as instructed, no medication side effects noted, no TIA's, no chest pain on exertion, no dyspnea on exertion, no swelling of ankles, no palpitations, no muscle aches or pain. Diabetes Mellitus:  She has diabetes mellitus type 1 dx at the age of 25. She has been on insulin since her diagnoses. She has retinopathy. Diabetic ROS - medication compliance: compliant most of the time, diabetic diet compliance: compliant most of the time, home glucose monitoring: is performed regularly with the [facility] system. She is using her SS humalog insulin, she states that her BS are averaging in the 170s. Admits that she has been doing more \"snacking\" she is has been more at home. She has  been exercising some with walking 2 to 3 times a week. Plans to increase her walking as the wether gets warmer. Further diabetic ROS: no chest pain, dyspnea or TIA's, no numbness, has some  tingling in the extremities which has been stable. Vision has been stable.   Last eye exam was this past January. Has nocturia about 1 to 2 times but drinks close to bedtime and thinks that it is more related to this than her BS. Lab review: orders written for new lab studies as appropriate; see orders. Patient Active Problem List   Diagnosis Code    Gastroesophageal reflux disease without esophagitis K21.9    History of hysterectomy for benign disease Z90.710    Diabetic retinopathy ([address] E11.319    Type 1 diabetes mellitus with retinopathy of right eye without macular edema ([address] E10.319    Encounter for long-term (current) use of insulin ([address] Z79.4    Encounter for medication monitoring Z51.81    Essential hypertension I10    Mixed hyperlipidemia E78.2    Chronic constipation K59.09    Severe obesity (BMI 35.0-39. 9) with comorbidity ([address] E66.01    Uncontrolled type 1 diabetes mellitus with retinopathy of both eyes ([address] E10.319, E10.65    Type 2 diabetes with nephropathy (HCC) E11.21       Current Outpatient Medications   Medication Sig Dispense Refill    flash glucose sensor (FreeStyle Soo 14 Day Sensor) kit USE TO MONITOR BLOOD SUGAR 7 Kit 3    insulin glargine (Lantus Solostar U-100 Insulin) 100 unit/mL (3 mL) inpn 110 Units by SubCUTAneous route daily.  3 Pen 3    atorvastatin (LIPITOR) 80 mg tablet TAKE 1 TABLET BY MOUTH DAILY 30 Tab 11    topiramate (TOPAMAX) 50 mg tablet TAKE 1 TABLET BY MOUTH TWICE DAILY 60 Tab 5    hydroCHLOROthiazide (HYDRODIURIL) 25 mg tablet TAKE 1 TABLET BY MOUTH DAILY 90 Tab 3    VENTOLIN HFA 90 mcg/actuation inhaler INHALE 2 PUFFS BY MOUTH EVERY 4 HOURS AS NEEDED FOR WHEEZING OR SHORTNESS OF BREATH 18 g 1    ibuprofen (MOTRIN) 800 mg tablet TAKE 1 TABLET BY MOUTH TWICE DAILY AS NEEDED FOR PAIN 60 Tab 1    flash glucose scanning reader (FREESTYLE SOO 14 DAY READER) misc USE TO MONITOR BLOOD SUGAR MULTIPLE TIMES DAILY AS DIRECTED , E10.319 2 Each 5    cyanocobalamin (VITAMIN B12) 1,000 mcg/mL injection 1 mL by IntraMUSCular route every thirty (30) days. 1 Vial 6    insulin lispro (HUMALOG) 100 unit/mL kwikpen For Blood Sugar  use 12 units, 151-200 use 18 units, 201-250 use 22 units, 251-300 use 28units and >301 use 32 units  Indications: type 2 diabetes mellitus 1 Package 0    furosemide (LASIX) 20 mg tablet Take 1 Tab by mouth daily. 30 Tab 6    fexofenadine (ALLEGRA) 180 mg tablet TAKE 1 TABLET BY MOUTH DAILY AS NEEDED FOR ALLERGIES OR RHINITIS 30 Tab 6    fluticasone propionate (FLONASE) 50 mcg/actuation nasal spray INSTILL 2 SPRAYS IN EACH NOSTRIL ONCE DAILY AS NEEDED FOR NASAL CONGESTION 1 Bottle 6    ezetimibe (ZETIA) 10 mg tablet TAKE 1 TABLET BY MOUTH DAILY 30 Tab 11    pantoprazole (PROTONIX) 40 mg tablet TAKE 1 TABLET BY MOUTH ONCE DAILY 30 Tab 11    lisinopril (PRINIVIL, ZESTRIL) 20 mg tablet Take 2 Tabs by mouth daily. 180 Tab 1    Insulin Needles, Disposable, (PER PEN NEEDLE) 32 gauge x 5/32\" ndle Use to inject insulin 4 times daily as directed. 100 Pen Needle 11    linaclotide (LINZESS) 290 mcg cap capsule Take 290 mcg by mouth Daily (before breakfast).          Allergies   Allergen Reactions    Lasix [Furosemide] Hives    Percocet [Oxycodone-Acetaminophen] Other (comments)     AMS       Past Medical History:   Diagnosis Date    Diabetes ([address]     Diabetic retinopathy ([address]     right eye     Gestational diabetes     Hypercholesterolemia     Hypertension     Type 1 diabetes ([address]        Past Surgical History:   Procedure Laterality Date    HX BREAST REDUCTION Bilateral     HX CATARACT REMOVAL  2018    left eye    HX  SECTION  1990    HX  SECTION  2000    HX COLONOSCOPY      HX HEENT  2016    right eye surgery- repaired blood vessel    HX HEENT  2017    left eye surgery- removal of scar tissue    HX HEENT  2018    left eye-laser    HX HEENT  10/23/2018    left eye injection    HX HEENT  2018    left eye intravitreal injection    HX HEENT  2019    left eye intravitreal injection    HX HYSTERECTOMY      partial       Family History   Problem Relation Age of Onset    Hypertension Mother     Diabetes Father     No Known Problems Brother     Suicide Brother     Asthma Daughter     Asthma Son     Psoriasis Son        Social History     Tobacco Use    Smoking status: Never Smoker    Smokeless tobacco: Never Used   Substance Use Topics    Alcohol use: No     Alcohol/week: 0.0 standard drinks        Lab Results   Component Value Date/Time    WBC 6.2 01/28/2019 09:10 AM    HGB 12.8 01/28/2019 09:10 AM    HCT 40.3 01/28/2019 09:10 AM    PLATELET 329 89/02/6787 09:10 AM    MCV 80 01/28/2019 09:10 AM     Lab Results   Component Value Date/Time    Cholesterol, total 168 09/20/2019 03:15 PM    HDL Cholesterol 57 09/20/2019 03:15 PM    LDL, calculated 86 09/20/2019 03:15 PM    Triglyceride 125 09/20/2019 03:15 PM    CHOL/HDL Ratio 3.1 02/25/2010 08:25 AM     Lab Results   Component Value Date/Time    TSH 0.942 05/16/2017 03:47 PM      Lab Results   Component Value Date/Time    Sodium 144 09/20/2019 03:15 PM    Potassium 4.1 09/20/2019 03:15 PM    Chloride 102 09/20/2019 03:15 PM    CO2 26 09/20/2019 03:15 PM    Anion gap 8 02/25/2010 08:25 AM    Glucose 128 (H) 09/20/2019 03:15 PM    BUN 10 09/20/2019 03:15 PM    Creatinine 1.08 (H) 09/20/2019 03:15 PM    BUN/Creatinine ratio 9 09/20/2019 03:15 PM    GFR est AA 68 09/20/2019 03:15 PM    GFR est non-AA 59 (L) 09/20/2019 03:15 PM    Calcium 9.4 09/20/2019 03:15 PM    Bilirubin, total <0.2 09/20/2019 03:15 PM    ALT (SGPT) 22 09/20/2019 03:15 PM    AST (SGOT) 14 09/20/2019 03:15 PM    Alk.  phosphatase 131 (H) 09/20/2019 03:15 PM    Protein, total 6.4 09/20/2019 03:15 PM    Albumin 4.2 09/20/2019 03:15 PM    Globulin 3.3 02/25/2010 08:25 AM    A-G Ratio 1.9 09/20/2019 03:15 PM      Lab Results   Component Value Date/Time    Hemoglobin A1c 11.5 (H) 02/25/2010 08:25 AM    Hemoglobin A1c (POC) 10.0 09/20/2019 03:12 PM Review of Systems   Constitutional: Negative for malaise/fatigue. HENT: Negative for congestion. Eyes: Negative for blurred vision. Respiratory: Negative for cough and shortness of breath. Cardiovascular: Negative for chest pain, palpitations and leg swelling. Gastrointestinal: Negative for abdominal pain, constipation and heartburn. Genitourinary: Negative for dysuria, frequency and urgency. Musculoskeletal: Negative for back pain and joint pain. Neurological: Negative for dizziness, tingling and headaches. Endo/Heme/Allergies: Negative for environmental allergies. Psychiatric/Behavioral: Negative for depression. The patient does not have insomnia. Physical Exam  Constitutional:       Appearance: Normal appearance. Pulmonary:      Effort: Pulmonary effort is normal.   Neurological:      Mental Status: She is alert. Psychiatric:         Mood and Affect: Mood normal.         Thought Content: Thought content normal.         ASSESSMENT and PLAN  Diagnoses and all orders for this visit:    1. Type 1 diabetes mellitus with retinopathy of both eyes, macular edema presence unspecified, unspecified retinopathy severity (HCC)  -     flash glucose sensor (FreeStyle Soo 14 Day Sensor) kit; USE TO MONITOR BLOOD SUGAR  -     insulin glargine (Lantus Solostar U-100 Insulin) 100 unit/mL (3 mL) inpn; 110 Units by SubCUTAneous route daily.  -     HEMOGLOBIN A1C WITH EAG  Will get labs done thru [employer]    2. Essential hypertension  Discussed sodium restriction, high k rich diet, maintaining ideal body weight and regular exercise program such as daily walking 30 min perday 4-5 times per week, as physiologic means to achieve blood pressure control.  Medication compliance advised. 3. Mixed hyperlipidemia  -     LIPID PANEL    4. Gastroesophageal reflux disease, esophagitis presence not specified  Stable on protonix. 5. B12 deficiency  Stable with her injection    6.  Encounter for long-term (current) use of insulin (HCC)//  7. Encounter for medication monitoring  -     METABOLIC PANEL, COMPREHENSIVE  -     CBC W/O DIFF    8. Non morbid obesity  I have reviewed/discussed the above normal BMI with the patient. I have recommended the following interventions: dietary management education, guidance, and counseling. reviewed diet, exercise and weight control  cardiovascular risk and specific lipid/LDL goals reviewed  reviewed medications and side effects in detail  specific diabetic recommendations: low cholesterol diet, weight control and daily exercise discussed, foot care discussed and Podiatry visits discussed, annual eye examinations at Ophthalmology discussed and glycohemoglobin and other lab monitoring discussed     I have discussed diagnosis listed in this note with pt and/or family. I have discussed treatment plans and options and the risk/benefit analysis of those options, including safe use of medications and possible medication side effects. Through the use of shared decision making we have agreed to the above plan. The patient has received an after-visit summary and questions were answered concerning future plans and follow up. Advise pt of any urgent changes then to proceed to the ER. Take everyday precautions to keep space between yourself and others when you go out in public. Avoid crowds as much as possible. Avoid non-essential travel going only out to get groceries, seek medical care or to the pharmacy to pick prescriptions. Wear a facemask when going out. During a COVID-19 outbreak in your community or work, stay home as much as possible to further reduce your risk of being exposed. Pt was told that currently there is no antiviral medication which is recommended to treat COVID-19.  Current treatment is aimed to relieve sxs such as pain relievers and to avoid ibuprofen but to use acetaminophen, anti-cough medication, get plenty of rest and a lot of oral hydration. Due to this being a TeleHealth encounter (During [address] emergency), evaluation of the following organ systems was limited: Vital/Constitutional/EENT/Resp/CV/GI//MS/Neuro/Skin/Heme-Lymph-Imm. Pursuant to the emergency declaration under the [address] and [place] [address] waiver authority and the [employer] Act, this Virtual Visit was conducted, with patient's consent, to reduce the patient's risk of exposure to COVID-19 and provide continuity of care for an established patient. Services were provided through a video synchronous discussion virtually to substitute for in-person appointment. This visit was completed using [email or web address] me    Time in virtual visit: 12 minutes verbal abuse

## 2020-05-12 NOTE — PROGRESS NOTES
Chief Complaint   Patient presents with    Cholesterol Problem     follow up    Diabetes     follow up     Patient states she is no longer taking Toujeo due to cost of medication. (too expensive)    Patient states fasting blood sugar was 102 this morning. A1C 9/20/2019    Microalbumin 9/20/2019    Mammogram 10/25/2019    Colonoscopy 8/21/2019 by Dr. Curt Hays- repeat in 10 years. Eye exam 5/15/2019 by Dr. Gibson Holloway. 1. Have you been to the ER, urgent care clinic since your last visit? Hospitalized since your last visit? no    2. Have you seen or consulted any other health care providers outside of the [address] [place] [address] since your last visit? Include any pap smears or colon screening.   no

## 2020-07-06 ENCOUNTER — TELEPHONE (OUTPATIENT)
Dept: FAMILY MEDICINE CLINIC | Age: 55
End: 2020-07-06

## 2020-07-06 NOTE — TELEPHONE ENCOUNTER
Verified patient with two types of identifiers. States that genet was exposed to covid-19. Wanted to know what to do. Advised of CDC recommendations as well as test sites that will test with no symptoms.

## 2020-07-06 NOTE — TELEPHONE ENCOUNTER
Patient would like a call from [address] [place] [address] regarding questions about the [facility], [facility] virus she can be reached @ [phone]

## 2020-07-27 RX ORDER — IBUPROFEN 800 MG/1
TABLET ORAL
Qty: 60 TAB | Refills: 1 | Status: SHIPPED | OUTPATIENT
Start: 2020-07-27 | End: 2020-08-28

## 2020-08-27 DIAGNOSIS — E53.8 B12 DEFICIENCY: ICD-10-CM

## 2020-08-27 RX ORDER — CYANOCOBALAMIN 1000 UG/ML
INJECTION, SOLUTION INTRAMUSCULAR; SUBCUTANEOUS
Qty: 10 ML | Refills: 1 | Status: SHIPPED | OUTPATIENT
Start: 2020-08-27 | End: 2021-12-29 | Stop reason: SDUPTHER

## 2020-08-28 RX ORDER — IBUPROFEN 800 MG/1
TABLET ORAL
Qty: 60 TAB | Refills: 1 | Status: SHIPPED | OUTPATIENT
Start: 2020-08-28 | End: 2021-01-26 | Stop reason: SDUPTHER

## 2020-11-03 ENCOUNTER — OFFICE VISIT (OUTPATIENT)
Dept: FAMILY MEDICINE CLINIC | Age: 55
End: 2020-11-03

## 2020-11-03 NOTE — PROGRESS NOTES
HISTORY OF PRESENT ILLNESS Caro Umanzor is a 47 y.o. female. HPI Follow up on chronic medical problems. Overall has been feeling well. She is teleworking. Lives with her mother and her son. They have mostly been at home. She is taking the precautionary measures with sanitizing her home regularly. Wearing mask when she goes out to the drug store. Cardiovascular Review: 
The patient has hypertension and hyperlipidemia. Diet and Lifestyle: generally follows a low fat low cholesterol diet, generally follows a low sodium diet, exercises sporadically, nonsmoker Home BP Monitoring: is not measured at home. Pertinent ROS: taking medications as instructed, no medication side effects noted, no TIA's, no chest pain on exertion, no dyspnea on exertion, no swelling of ankles, no palpitations, no muscle aches or pain. Diabetes Mellitus: 
She has diabetes mellitus type 1 dx at the age of 25. She has been on insulin since her diagnoses. She has retinopathy. Diabetic ROS - medication compliance: compliant most of the time, diabetic diet compliance: compliant most of the time, home glucose monitoring: is performed regularly with the [employer] system. She is using her SS humalog insulin, she states that her BS are averaging in the 170s. Admits that she has been doing more \"snacking\" she is has been more at home. She has  been exercising some with walking 2 to 3 times a week. Plans to increase her walking as the wether gets warmer. Further diabetic ROS: no chest pain, dyspnea or TIA's, no numbness, has some  tingling in the extremities which has been stable. Vision has been stable. Last eye exam was this past January. Has nocturia about 1 to 2 times but drinks close to bedtime and thinks that it is more related to this than her BS. Lab review: orders written for new lab studies as appropriate; see orders. Patient Active Problem List  
Diagnosis Code  Gastroesophageal reflux disease without esophagitis K21.9  History of hysterectomy for benign disease Z90.710  
 Diabetic retinopathy ([facility] E11.319  Type 1 diabetes mellitus with retinopathy of right eye without macular edema (HCC) E10.319  
 Encounter for long-term (current) use of insulin ([facility] Z79.4  
 Encounter for medication monitoring Z51.81  
 Essential hypertension I10  
 Mixed hyperlipidemia E78.2  Chronic constipation K59.09  Severe obesity (BMI 35.0-39. 9) with comorbidity ([facility] E66.01  
 Uncontrolled type 1 diabetes mellitus with retinopathy of both eyes ([facility] E10.319, E10.65  Type 2 diabetes with nephropathy (HCC) E11.21 Current Outpatient Medications Medication Sig Dispense Refill  ibuprofen (MOTRIN) 800 mg tablet TAKE 1 TABLET BY MOUTH TWICE DAILY AS NEEDED FOR PAIN 60 Tab 1  cyanocobalamin (VITAMIN B12) 1,000 mcg/mL injection ADMINISTER 1 ML IN THE MUSCLE EVERY 30 DAYS 10 mL 1  
 flash glucose sensor (FreeStyle Soo 14 Day Sensor) kit USE TO MONITOR BLOOD SUGAR 7 Kit 3  
 insulin glargine (Lantus Solostar U-100 Insulin) 100 unit/mL (3 mL) inpn 110 Units by SubCUTAneous route daily. 3 Pen 3  
 atorvastatin (LIPITOR) 80 mg tablet TAKE 1 TABLET BY MOUTH DAILY 30 Tab 11  
 topiramate (TOPAMAX) 50 mg tablet TAKE 1 TABLET BY MOUTH TWICE DAILY 60 Tab 5  hydroCHLOROthiazide (HYDRODIURIL) 25 mg tablet TAKE 1 TABLET BY MOUTH DAILY 90 Tab 3  
 VENTOLIN HFA 90 mcg/actuation inhaler INHALE 2 PUFFS BY MOUTH EVERY 4 HOURS AS NEEDED FOR WHEEZING OR SHORTNESS OF BREATH 18 g 1  
 flash glucose scanning reader (FREESTYLE SOO 14 DAY READER) misc USE TO MONITOR BLOOD SUGAR MULTIPLE TIMES DAILY AS DIRECTED , E10.319 2 Each 5  
 insulin lispro (HUMALOG) 100 unit/mL kwikpen For Blood Sugar  use 12 units, 151-200 use 18 units, 201-250 use 22 units, 251-300 use 28units and >301 use 32 units  Indications: type 2 diabetes mellitus 1 Package 0  
  furosemide (LASIX) 20 mg tablet Take 1 Tab by mouth daily. 30 Tab 6  
 fexofenadine (ALLEGRA) 180 mg tablet TAKE 1 TABLET BY MOUTH DAILY AS NEEDED FOR ALLERGIES OR RHINITIS 30 Tab 6  fluticasone propionate (FLONASE) 50 mcg/actuation nasal spray INSTILL 2 SPRAYS IN EACH NOSTRIL ONCE DAILY AS NEEDED FOR NASAL CONGESTION 1 Bottle 6  
 ezetimibe (ZETIA) 10 mg tablet TAKE 1 TABLET BY MOUTH DAILY 30 Tab 11  
 pantoprazole (PROTONIX) 40 mg tablet TAKE 1 TABLET BY MOUTH ONCE DAILY 30 Tab 11  
 lisinopril (PRINIVIL, ZESTRIL) 20 mg tablet Take 2 Tabs by mouth daily. 180 Tab 1  
 Insulin Needles, Disposable, (PER PEN NEEDLE) 32 gauge x 32\" ndle Use to inject insulin 4 times daily as directed. 100 Pen Needle 11  
 linaclotide (LINZESS) 290 mcg cap capsule Take 290 mcg by mouth Daily (before breakfast). Allergies Allergen Reactions  Lasix [Furosemide] Hives  Percocet [Oxycodone-Acetaminophen] Other (comments) AMS Past Medical History:  
Diagnosis Date  Diabetes ([address]  Diabetic retinopathy ([address] right eye  Gestational diabetes  Hypercholesterolemia  Hypertension  Type 1 diabetes ([address] Past Surgical History:  
Procedure Laterality Date  HX BREAST REDUCTION Bilateral   HX CATARACT REMOVAL  2018  
 left eye  HX  SECTION  1990  
 HX  SECTION  2000  HX COLONOSCOPY    
 HX HEENT  2016  
 right eye surgery- repaired blood vessel  HX HEENT  2017  
 left eye surgery- removal of scar tissue  HX HEENT  2018  
 left eye-laser  HX HEENT  10/23/2018  
 left eye injection  HX HEENT  2018  
 left eye intravitreal injection  HX HEENT  2019  
 left eye intravitreal injection  HX HYSTERECTOMY partial  
 
 
 
Family History Problem Relation Age of Onset  Hypertension Mother  Diabetes Father  No Known Problems Brother  Suicide Brother  Asthma Daughter  Asthma Son  Psoriasis Son   
 
 
Social History Tobacco Use  Smoking status: Never Smoker  Smokeless tobacco: Never Used Substance Use Topics  Alcohol use: No  
  Alcohol/week: 0.0 standard drinks Lab Results Component Value Date/Time WBC 6.2 01/28/2019 09:10 AM  
 HGB 12.8 01/28/2019 09:10 AM  
 HCT 40.3 01/28/2019 09:10 AM  
 PLATELET 484 71/27/6854 09:10 AM  
 MCV 80 01/28/2019 09:10 AM  
 
Lab Results Component Value Date/Time Cholesterol, total 168 09/20/2019 03:15 PM  
 HDL Cholesterol 57 09/20/2019 03:15 PM  
 LDL, calculated 86 09/20/2019 03:15 PM  
 Triglyceride 125 09/20/2019 03:15 PM  
 CHOL/HDL Ratio 3.1 02/25/2010 08:25 AM  
 
Lab Results Component Value Date/Time TSH 0.942 05/16/2017 03:47 PM  
  
Lab Results Component Value Date/Time Sodium 144 09/20/2019 03:15 PM  
 Potassium 4.1 09/20/2019 03:15 PM  
 Chloride 102 09/20/2019 03:15 PM  
 CO2 26 09/20/2019 03:15 PM  
 Anion gap 8 02/25/2010 08:25 AM  
 Glucose 128 (H) 09/20/2019 03:15 PM  
 BUN 10 09/20/2019 03:15 PM  
 Creatinine 1.08 (H) 09/20/2019 03:15 PM  
 BUN/Creatinine ratio 9 09/20/2019 03:15 PM  
 GFR est AA 68 09/20/2019 03:15 PM  
 GFR est non-AA 59 (L) 09/20/2019 03:15 PM  
 Calcium 9.4 09/20/2019 03:15 PM  
 Bilirubin, total <0.2 09/20/2019 03:15 PM  
 ALT (SGPT) 22 09/20/2019 03:15 PM  
 Alk. phosphatase 131 (H) 09/20/2019 03:15 PM  
 Protein, total 6.4 09/20/2019 03:15 PM  
 Albumin 4.2 09/20/2019 03:15 PM  
 Globulin 3.3 02/25/2010 08:25 AM  
 A-G Ratio 1.9 09/20/2019 03:15 PM  
  
Lab Results Component Value Date/Time Hemoglobin A1c 11.5 (H) 02/25/2010 08:25 AM  
 Hemoglobin A1c (POC) 10.0 09/20/2019 03:12 PM  
   
 
Review of Systems Constitutional: Negative for malaise/fatigue. HENT: Negative for congestion. Eyes: Negative for blurred vision. Respiratory: Negative for cough and shortness of breath. Cardiovascular: Negative for chest pain, palpitations and leg swelling. Gastrointestinal: Negative for abdominal pain, constipation and heartburn. Genitourinary: Negative for dysuria, frequency and urgency. Musculoskeletal: Negative for back pain and joint pain. Neurological: Negative for dizziness, tingling and headaches. Endo/Heme/Allergies: Negative for environmental allergies. Psychiatric/Behavioral: Negative for depression. The patient does not have insomnia. Physical Exam 
Vitals signs and nursing note reviewed. Constitutional:   
   Appearance: Normal appearance. She is well-developed. HENT:  
   Right Ear: Tympanic membrane and ear canal normal.  
   Left Ear: Tympanic membrane and ear canal normal.  
   Nose: No mucosal edema or rhinorrhea. Neck: Musculoskeletal: Normal range of motion and neck supple. Thyroid: No thyromegaly. Cardiovascular:  
   Rate and Rhythm: Normal rate and regular rhythm. Heart sounds: Normal heart sounds. Pulmonary:  
   Effort: Pulmonary effort is normal.  
   Breath sounds: Normal breath sounds. Abdominal:  
   General: Bowel sounds are normal.  
   Palpations: Abdomen is soft. There is no mass. Tenderness: There is no abdominal tenderness. Musculoskeletal: Normal range of motion. General: No swelling. Lymphadenopathy:  
   Cervical: No cervical adenopathy. Skin: 
   General: Skin is warm and dry. Neurological:  
   General: No focal deficit present. Mental Status: She is alert and oriented to person, place, and time. Psychiatric:     
   Mood and Affect: Mood normal.  
 
 
 
ASSESSMENT and PLAN 
{ASSESSMENT/PLAN:[id number]}

## 2020-12-03 ENCOUNTER — TRANSCRIBE ORDER (OUTPATIENT)
Dept: SCHEDULING | Age: 55
End: 2020-12-03

## 2020-12-03 DIAGNOSIS — K59.00 CONSTIPATION: ICD-10-CM

## 2020-12-03 DIAGNOSIS — K21.9 GASTROESOPHAGEAL REFLUX DISEASE: ICD-10-CM

## 2020-12-03 DIAGNOSIS — E11.9 DIABETES MELLITUS (HCC): Primary | ICD-10-CM

## 2020-12-03 DIAGNOSIS — R14.0 BLOATING: ICD-10-CM

## 2020-12-03 DIAGNOSIS — K59.04 CHRONIC IDIOPATHIC CONSTIPATION: ICD-10-CM

## 2020-12-04 ENCOUNTER — TRANSCRIBE ORDER (OUTPATIENT)
Dept: SCHEDULING | Age: 55
End: 2020-12-04

## 2020-12-07 ENCOUNTER — TRANSCRIBE ORDER (OUTPATIENT)
Dept: SCHEDULING | Age: 55
End: 2020-12-07

## 2020-12-07 DIAGNOSIS — K31.84 GASTROPARESIS: ICD-10-CM

## 2020-12-07 DIAGNOSIS — E11.9 DIABETES MELLITUS (HCC): Primary | ICD-10-CM

## 2021-01-26 ENCOUNTER — VIRTUAL VISIT (OUTPATIENT)
Dept: FAMILY MEDICINE CLINIC | Age: 56
End: 2021-01-26
Payer: COMMERCIAL

## 2021-01-26 DIAGNOSIS — E10.319 TYPE 1 DIABETES MELLITUS WITH RETINOPATHY OF BOTH EYES, MACULAR EDEMA PRESENCE UNSPECIFIED, UNSPECIFIED RETINOPATHY SEVERITY (HCC): ICD-10-CM

## 2021-01-26 DIAGNOSIS — J01.00 ACUTE NON-RECURRENT MAXILLARY SINUSITIS: Primary | ICD-10-CM

## 2021-01-26 PROCEDURE — 99212 OFFICE O/P EST SF 10 MIN: CPT | Performed by: FAMILY MEDICINE

## 2021-01-26 RX ORDER — LORATADINE 10 MG/1
10 TABLET ORAL
COMMUNITY
End: 2022-08-22 | Stop reason: ALTCHOICE

## 2021-01-26 RX ORDER — FLASH GLUCOSE SENSOR
KIT MISCELLANEOUS
Qty: 7 KIT | Refills: 11 | Status: SHIPPED | OUTPATIENT
Start: 2021-01-26 | End: 2021-04-19

## 2021-01-26 RX ORDER — IBUPROFEN 800 MG/1
TABLET ORAL
Qty: 60 TAB | Refills: 1 | Status: SHIPPED | OUTPATIENT
Start: 2021-01-26 | End: 2021-03-22

## 2021-01-26 RX ORDER — INSULIN GLARGINE 100 [IU]/ML
110 INJECTION, SOLUTION SUBCUTANEOUS 2 TIMES DAILY
COMMUNITY
End: 2021-07-02 | Stop reason: SDUPTHER

## 2021-01-26 NOTE — PROGRESS NOTES
HISTORY OF PRESENT ILLNESS  Jignesh Arvizu is a 54 y.o. female. HPI   Patient encounter by synchronous (real-time) audio-video technology which is patient-initiated. Patient is aware that this encounter is a billable service with coverage as determined by her insurance carrier, all discussed at the time of check-in. Patient has given verbal consent to proceed. C/o nasal congestion, headache and pressure in the face and cough for the past 2 weeks. No fever or chills noted. Throat is scratchy. Has post nasal drainage. No chest pain or SOB. No wheezing noted. No loss of taste or smell. No one else at home sick. Lives with her mother age 67. She tested negative at [facility] [address] on 1/11 but she did not have sx. She only got tested since her son was getting tested in order to return to school. Sx came on about 1/18. Doing the precautionary measures at home to reduce risks of exposure COVID19. Also wearing mask when she is going out. No known sick contacts or known exposure to [address]. Patient Active Problem List   Diagnosis Code    Gastroesophageal reflux disease without esophagitis K21.9    History of hysterectomy for benign disease Z90.710    Diabetic retinopathy ([address] E11.319    Type 1 diabetes mellitus with retinopathy of right eye without macular edema ([address] E10.319    Encounter for long-term (current) use of insulin ([address] Z79.4    Encounter for medication monitoring Z51.81    Essential hypertension I10    Mixed hyperlipidemia E78.2    Chronic constipation K59.09    Severe obesity (BMI 35.0-39. 9) with comorbidity ([address] E66.01    Uncontrolled type 1 diabetes mellitus with retinopathy of both eyes ([address] E10.319, E10.65    Type 2 diabetes with nephropathy (HCC) E11.21       Current Outpatient Medications   Medication Sig Dispense Refill    insulin glargine (Lantus Solostar U-100 Insulin) 100 unit/mL (3 mL) inpn 110 Units by SubCUTAneous route two (2) times a day.       loratadine (Claritin) 10 mg tablet Take 10 mg by mouth daily as needed for Allergies.  ibuprofen (MOTRIN) 800 mg tablet TAKE 1 TABLET BY MOUTH TWICE DAILY AS NEEDED FOR PAIN 60 Tab 1    flash glucose sensor (FreeStyle Soo 14 Day Sensor) kit USE TO MONITOR BLOOD SUGAR 7 Kit 11    cyanocobalamin (VITAMIN B12) 1,000 mcg/mL injection ADMINISTER 1 ML IN THE MUSCLE EVERY 30 DAYS 10 mL 1    atorvastatin (LIPITOR) 80 mg tablet TAKE 1 TABLET BY MOUTH DAILY 30 Tab 11    topiramate (TOPAMAX) 50 mg tablet TAKE 1 TABLET BY MOUTH TWICE DAILY 60 Tab 5    hydroCHLOROthiazide (HYDRODIURIL) 25 mg tablet TAKE 1 TABLET BY MOUTH DAILY 90 Tab 3    VENTOLIN HFA 90 mcg/actuation inhaler INHALE 2 PUFFS BY MOUTH EVERY 4 HOURS AS NEEDED FOR WHEEZING OR SHORTNESS OF BREATH 18 g 1    flash glucose scanning reader (FREESTYLE SOO 14 DAY READER) [facility] [place] USE TO MONITOR BLOOD SUGAR MULTIPLE TIMES DAILY AS DIRECTED , E10.319 2 Each 5    insulin lispro (HUMALOG) 100 unit/mL kwikpen For Blood Sugar  use 12 units, 151-200 use 18 units, 201-250 use 22 units, 251-300 use 28units and >301 use 32 units  Indications: type 2 diabetes mellitus 1 Package 0    fexofenadine (ALLEGRA) 180 mg tablet TAKE 1 TABLET BY MOUTH DAILY AS NEEDED FOR ALLERGIES OR RHINITIS 30 Tab 6    fluticasone propionate (FLONASE) 50 mcg/actuation nasal spray INSTILL 2 SPRAYS IN EACH NOSTRIL ONCE DAILY AS NEEDED FOR NASAL CONGESTION 1 Bottle 6    ezetimibe (ZETIA) 10 mg tablet TAKE 1 TABLET BY MOUTH DAILY 30 Tab 11    pantoprazole (PROTONIX) 40 mg tablet TAKE 1 TABLET BY MOUTH ONCE DAILY 30 Tab 11    lisinopril (PRINIVIL, ZESTRIL) 20 mg tablet Take 2 Tabs by mouth daily. 180 Tab 1    Insulin Needles, Disposable, (PER PEN NEEDLE) 32 gauge x 5/32\" ndle Use to inject insulin 4 times daily as directed. 100 Pen Needle 11    linaclotide (LINZESS) 290 mcg cap capsule Take 290 mcg by mouth Daily (before breakfast).       insulin glargine (Lantus Solostar U-100 Insulin) 100 unit/mL (3 mL) inpn 110 Units by SubCUTAneous route daily. (Patient taking differently: 110 Units by SubCUTAneous route two (2) times a day.) 3 Pen 3    furosemide (LASIX) 20 mg tablet Take 1 Tab by mouth daily. 30 Tab 6       Allergies   Allergen Reactions    Lasix [Furosemide] Hives    Percocet [Oxycodone-Acetaminophen] Other (comments)     AMS       Past Medical History:   Diagnosis Date    Diabetes ([address]     Diabetic retinopathy ([address]     right eye     Gestational diabetes     Hypercholesterolemia     Hypertension     Type 1 diabetes ([address]        Past Surgical History:   Procedure Laterality Date    HX BREAST REDUCTION Bilateral 1984    HX CATARACT REMOVAL  2018    left eye    HX  SECTION  1990    HX  SECTION  2000    HX COLONOSCOPY      HX HEENT  2016    right eye surgery- repaired blood vessel    HX HEENT  2017    left eye surgery- removal of scar tissue    HX HEENT  2018    left eye-laser    HX HEENT  10/23/2018    left eye injection    HX HEENT  2018    left eye intravitreal injection    HX HEENT  2019    left eye intravitreal injection    HX HYSTERECTOMY      partial       Family History   Problem Relation Age of Onset    Hypertension Mother     Diabetes Father     No Known Problems Brother     Suicide Brother     Asthma Daughter     Asthma Son     Psoriasis Son        Social History     Tobacco Use    Smoking status: Never Smoker    Smokeless tobacco: Never Used   Substance Use Topics    Alcohol use: No     Alcohol/week: 0.0 standard drinks     Frequency: Never        Review of Systems   Constitutional: Negative for chills, fever and malaise/fatigue. HENT: Positive for congestion and sinus pain. Eyes: Negative for blurred vision. Respiratory: Positive for cough. Negative for shortness of breath and wheezing. Cardiovascular: Negative for chest pain, palpitations and leg swelling.    Gastrointestinal: Negative for abdominal pain, constipation and heartburn. Genitourinary: Negative for dysuria, frequency and urgency. Musculoskeletal: Negative for back pain and joint pain. Neurological: Negative for dizziness, tingling and headaches. Endo/Heme/Allergies: Positive for environmental allergies. Psychiatric/Behavioral: Negative for depression. The patient does not have insomnia. Physical Exam  Constitutional:       Appearance: Normal appearance. Pulmonary:      Effort: Pulmonary effort is normal.   Neurological:      Mental Status: She is alert. Psychiatric:         Mood and Affect: Mood normal.         Thought Content: Thought content normal.         ASSESSMENT and PLAN  Diagnoses and all orders for this visit:    1. Acute non-recurrent maxillary sinusitis  -     NOVEL CORONAVIRUS (COVID-19)  Continue with symptomatic treatment with mucinex , claritin and flonase. Will add zpak. reviewed medications and side effects in detail    Discussed issues related to COVID-19. Pt was told that the best way to prevent illness is to avoid being exposed to this virus by cleaning  hands often using soap and water or using a hand  that contains at least 60% alcohol. Avoid touching your eyes, nose, and mouth. Avoid close contact with people who are sick. Put distance between yourself and other people of at least 6 feet. Throw used tissues in the trash immediately. Current treatment is aimed to relieve sxs such as pain relievers and to avoid ibuprofen but to use acetaminophen, anti-cough medication, get plenty of rest and a lot of oral hydration. Take everyday precautions to keep space between yourself and others when you go out in public. Avoid crowds as much as possible. Avoid non-essential travel going only out to get groceries, seek medical care or to the pharmacy to pick prescriptions. Wear a facemask when going out.   During a COVID-19 outbreak in your community or work, stay home as much as possible to further reduce your risk of being exposed. Pt was told that currently there is no antiviral medication which is recommended to treat COVID-19. Current treatment is aimed to relieve sxs such as pain relievers and to avoid ibuprofen but to use acetaminophen, anti-cough medication, get plenty of rest and a lot of oral hydration. Due to this being a TeleHealth encounter (During [address] emergency), evaluation of the following organ systems was limited: Vital/Constitutional/EENT/Resp/CV/GI//MS/Neuro/Skin/Heme-Lymph-Imm. Pursuant to the emergency declaration under the [address] and [place] [address] waiver authority and the [employer] Act, this Virtual Visit was conducted, with patient's consent, to reduce the patient's risk of exposure to COVID-19 and provide continuity of care for an established patient. Services were provided through a video synchronous discussion virtually to substitute for in-person appointment. This visit was completed using doxy. me    Time in virtual visit: 11 minutes

## 2021-01-26 NOTE — PROGRESS NOTES
Chief Complaint   Patient presents with    Cough     patient c/o dry cough    Other     patient c/o sinus drainage     Pressure Behind the Eyes     patient staets she had sinus pressure behind her eyes last week but has subsided         Patient states she went to [facility] 1/11/2021 to be tested for COVID because her son was home from [facility]. 1. Have you been to the ER, urgent care clinic since your last visit? Hospitalized since your last visit? Yes, [facility] 1/11/2021 test for COVID since son was home from [facility]    2. Have you seen or consulted any other health care providers outside of the [address] [place] [address] since your last visit? Include any pap smears or colon screening.  no

## 2021-01-27 ENCOUNTER — TELEPHONE (OUTPATIENT)
Dept: FAMILY MEDICINE CLINIC | Age: 56
End: 2021-01-27

## 2021-01-27 RX ORDER — AZITHROMYCIN 250 MG/1
TABLET, FILM COATED ORAL
Qty: 6 TAB | Refills: 0 | Status: SHIPPED | OUTPATIENT
Start: 2021-01-27 | End: 2021-02-12 | Stop reason: ALTCHOICE

## 2021-01-27 NOTE — TELEPHONE ENCOUNTER
----- Message from Nesha Meza sent at 1/27/2021  3:19 PM EST -----  Regarding: Dr. Jazmyn Perea Message/Vendor Calls    Caller's first and last name: pt      Reason for call: Covid-19 test results      Callback required yes/no and why: yes to confirm      Best contact number(s): [phone]        Details to clarify the request: pt wanted to know if her rapid test results have come back yet.       Nesha Meza

## 2021-01-27 NOTE — TELEPHONE ENCOUNTER
Advised patient that quick test is negative but waiting on 2 day Covid test which is more accurate. Advised patient to  Z carol and start regiment.

## 2021-01-29 ENCOUNTER — TELEPHONE (OUTPATIENT)
Dept: FAMILY MEDICINE CLINIC | Age: 56
End: 2021-01-29

## 2021-01-29 LAB — SARS-COV-2, NAA: NOT DETECTED

## 2021-01-29 NOTE — TELEPHONE ENCOUNTER
----- Message from Berna Olivares MD sent at 1/29/2021  2:58 PM EST -----  Please call call and let pt know that her covid 19 test is negative.

## 2021-01-29 NOTE — TELEPHONE ENCOUNTER
Called patient and informed, per Dr. Ac Amor 19 test was negative. Patient verbalized understanding.

## 2021-02-09 ENCOUNTER — TRANSCRIBE ORDER (OUTPATIENT)
Dept: SCHEDULING | Age: 56
End: 2021-02-09

## 2021-02-09 DIAGNOSIS — Z12.31 SCREENING MAMMOGRAM FOR HIGH-RISK PATIENT: Primary | ICD-10-CM

## 2021-02-12 ENCOUNTER — VIRTUAL VISIT (OUTPATIENT)
Dept: FAMILY MEDICINE CLINIC | Age: 56
End: 2021-02-12
Payer: COMMERCIAL

## 2021-02-12 DIAGNOSIS — J20.9 BRONCHITIS WITH BRONCHOSPASM: ICD-10-CM

## 2021-02-12 DIAGNOSIS — J01.90 ACUTE SINUSITIS, RECURRENCE NOT SPECIFIED, UNSPECIFIED LOCATION: Primary | ICD-10-CM

## 2021-02-12 PROCEDURE — 99212 OFFICE O/P EST SF 10 MIN: CPT | Performed by: FAMILY MEDICINE

## 2021-02-12 RX ORDER — PREDNISONE 10 MG/1
10 TABLET ORAL 2 TIMES DAILY
Qty: 10 TAB | Refills: 0 | Status: SHIPPED | OUTPATIENT
Start: 2021-02-12 | End: 2021-02-17

## 2021-02-12 RX ORDER — AZITHROMYCIN 250 MG/1
TABLET, FILM COATED ORAL
Qty: 6 TAB | Refills: 0 | Status: SHIPPED | OUTPATIENT
Start: 2021-02-12 | End: 2021-07-02 | Stop reason: ALTCHOICE

## 2021-02-12 RX ORDER — ALBUTEROL SULFATE 90 UG/1
AEROSOL, METERED RESPIRATORY (INHALATION)
Qty: 1 INHALER | Refills: 11 | Status: SHIPPED | OUTPATIENT
Start: 2021-02-12 | End: 2022-02-21

## 2021-02-12 NOTE — PROGRESS NOTES
HISTORY OF PRESENT ILLNESS  Harper Vasquez is a 54 y.o. female. HPI   Patient encounter by synchronous (real-time) audio-video technology which is patient-initiated. Patient is aware that this encounter is a billable service with coverage as determined by her insurance carrier, all discussed at the time of check-in. Patient has given verbal consent to proceed. Still has cough and congestion. Seem likes the Dorita Campi did not completely improved her sx. Cough is dry. Increase cough at night.has some tightness in the chest with the cough which is relieved with using her inhaler. Feels stuffy in the nose and head. Has tenderness over the eyebrows over her sinuses. No fever or chills. COVID19 negative. No loss of taste or smell. Patient Active Problem List   Diagnosis Code    Gastroesophageal reflux disease without esophagitis K21.9    History of hysterectomy for benign disease Z90.710    Diabetic retinopathy ([address] E11.319    Type 1 diabetes mellitus with retinopathy of right eye without macular edema ([address] E10.319    Encounter for long-term (current) use of insulin ([address] Z79.4    Encounter for medication monitoring Z51.81    Essential hypertension I10    Mixed hyperlipidemia E78.2    Chronic constipation K59.09    Severe obesity (BMI 35.0-39. 9) with comorbidity ([address] E66.01    Uncontrolled type 1 diabetes mellitus with retinopathy of both eyes ([address] E10.319, E10.65    Type 2 diabetes with nephropathy (HCC) E11.21       Current Outpatient Medications   Medication Sig Dispense Refill    azithromycin (ZITHROMAX) 250 mg tablet Take 2 tablets today, then take 1 tablet daily 6 Tab 0    predniSONE (DELTASONE) 10 mg tablet Take 10 mg by mouth two (2) times a day for 5 days.  10 Tab 0    Ventolin HFA 90 mcg/actuation inhaler INHALE 2 PUFFS BY MOUTH EVERY 4 HOURS AS NEEDED FOR WHEEZING OR SHORTNESS OF BREATH 1 Inhaler 11    insulin glargine (Lantus Solostar U-100 Insulin) 100 unit/mL (3 mL) inpn 110 Units by SubCUTAneous route two (2) times a day.  loratadine (Claritin) 10 mg tablet Take 10 mg by mouth daily as needed for Allergies.  ibuprofen (MOTRIN) 800 mg tablet TAKE 1 TABLET BY MOUTH TWICE DAILY AS NEEDED FOR PAIN 60 Tab 1    flash glucose sensor (FreeStyle Soo 14 Day Sensor) kit USE TO MONITOR BLOOD SUGAR 7 Kit 11    cyanocobalamin (VITAMIN B12) 1,000 mcg/mL injection ADMINISTER 1 ML IN THE MUSCLE EVERY 30 DAYS 10 mL 1    atorvastatin (LIPITOR) 80 mg tablet TAKE 1 TABLET BY MOUTH DAILY 30 Tab 11    topiramate (TOPAMAX) 50 mg tablet TAKE 1 TABLET BY MOUTH TWICE DAILY 60 Tab 5    hydroCHLOROthiazide (HYDRODIURIL) 25 mg tablet TAKE 1 TABLET BY MOUTH DAILY 90 Tab 3    flash glucose scanning reader (FREESTYLE SOO 14 DAY READER) [facility] USE TO MONITOR BLOOD SUGAR MULTIPLE TIMES DAILY AS DIRECTED , E10.319 2 Each 5    insulin lispro (HUMALOG) 100 unit/mL kwikpen For Blood Sugar  use 12 units, 151-200 use 18 units, 201-250 use 22 units, 251-300 use 28units and >301 use 32 units  Indications: type 2 diabetes mellitus 1 Package 0    furosemide (LASIX) 20 mg tablet Take 1 Tab by mouth daily. 30 Tab 6    fexofenadine (ALLEGRA) 180 mg tablet TAKE 1 TABLET BY MOUTH DAILY AS NEEDED FOR ALLERGIES OR RHINITIS 30 Tab 6    fluticasone propionate (FLONASE) 50 mcg/actuation nasal spray INSTILL 2 SPRAYS IN EACH NOSTRIL ONCE DAILY AS NEEDED FOR NASAL CONGESTION 1 Bottle 6    ezetimibe (ZETIA) 10 mg tablet TAKE 1 TABLET BY MOUTH DAILY 30 Tab 11    pantoprazole (PROTONIX) 40 mg tablet TAKE 1 TABLET BY MOUTH ONCE DAILY 30 Tab 11    lisinopril (PRINIVIL, ZESTRIL) 20 mg tablet Take 2 Tabs by mouth daily. 180 Tab 1    Insulin Needles, Disposable, (PER PEN NEEDLE) 32 gauge x 5/32\" ndle Use to inject insulin 4 times daily as directed. 100 Pen Needle 11    linaclotide (LINZESS) 290 mcg cap capsule Take 290 mcg by mouth Daily (before breakfast).       insulin glargine (Lantus Solostar U-100 Insulin) 100 unit/mL (3 mL) inpn 110 Units by SubCUTAneous route daily. (Patient taking differently: 110 Units by SubCUTAneous route two (2) times a day.) 3 Pen 3       Allergies   Allergen Reactions    Lasix [Furosemide] Hives    Percocet [Oxycodone-Acetaminophen] Other (comments)     AMS       Past Medical History:   Diagnosis Date    Diabetes ([address]     Diabetic retinopathy ([address]     right eye     Gestational diabetes     Hypercholesterolemia     Hypertension     Type 1 diabetes ([address]        Past Surgical History:   Procedure Laterality Date    HX BREAST REDUCTION Bilateral     HX CATARACT REMOVAL  2018    left eye    HX  SECTION  1990    HX  SECTION  2000    HX COLONOSCOPY      HX HEENT  2016    right eye surgery- repaired blood vessel    HX HEENT  2017    left eye surgery- removal of scar tissue    HX HEENT  2018    left eye-laser    HX HEENT  10/23/2018    left eye injection    HX HEENT  2018    left eye intravitreal injection    HX HEENT  2019    left eye intravitreal injection    HX HYSTERECTOMY      partial       Family History   Problem Relation Age of Onset    Hypertension Mother     Diabetes Father     No Known Problems Brother     Suicide Brother     Asthma Daughter     Asthma Son     Psoriasis Son        Social History     Tobacco Use    Smoking status: Never Smoker    Smokeless tobacco: Never Used   Substance Use Topics    Alcohol use: No     Alcohol/week: 0.0 standard drinks     Frequency: Never        ROS    Physical Exam  Constitutional:       Appearance: Normal appearance. Pulmonary:      Effort: Pulmonary effort is normal.   Neurological:      Mental Status: She is alert. Psychiatric:         Mood and Affect: Mood normal.         Thought Content: Thought content normal.         ASSESSMENT and PLAN  Diagnoses and all orders for this visit:    1. Acute sinusitis, recurrence not specified, unspecified location//  2. Bronchitis with bronchospasm  -     azithromycin (ZITHROMAX) 250 mg tablet; Take 2 tablets today, then take 1 tablet daily  -     predniSONE (DELTASONE) 10 mg tablet; Take 10 mg by mouth two (2) times a day for 5 days. -     Ventolin HFA 90 mcg/actuation inhaler; INHALE 2 PUFFS BY MOUTH EVERY 4 HOURS AS NEEDED FOR WHEEZING OR SHORTNESS OF BREATH      reviewed medications and side effects in detail    I have discussed diagnosis listed in this note with pt and/or family. I have discussed treatment plans and options and the risk/benefit analysis of those options, including safe use of medications and possible medication side effects. Through the use of shared decision making we have agreed to the above plan. Advise pt of any urgent changes then to proceed to the ER. Due to this being a TeleHealth encounter (During [address] emergency), evaluation of the following organ systems was limited: Vital/Constitutional/EENT/Resp/CV/GI//MS/Neuro/Skin/Heme-Lymph-Imm. Pursuant to the emergency declaration under the [address]  and [place] [address] waiver authority and the [employer] Act, this Virtual Visit was conducted, with patient's consent, to reduce the patient's risk of exposure to COVID-19 and provide continuity of care for an established patient. Services were provided through a video synchronous discussion virtually to substitute for in-person appointment. This visit was completed using doxy. me    Time in virtual visit: 10  minutes

## 2021-02-12 NOTE — PROGRESS NOTES
Chief Complaint   Patient presents with    Cough     patient states she is still cough some but cough has gotten better    Sinus Pain     patient c/o some sinus pressure       Patient states she had a COVID test 1/26/2021 and was negative. Patient c/o still have a cough, some sinus drainage, and some sinus pressure. Patient reports she completed [employer] 1/31/2021.      1. Have you been to the ER, urgent care clinic since your last visit? Hospitalized since your last visit? no    2. Have you seen or consulted any other health care providers outside of the [address] [place] [address] since your last visit? Include any pap smears or colon screening.  no

## 2021-03-22 RX ORDER — IBUPROFEN 800 MG/1
TABLET ORAL
Qty: 60 TAB | Refills: 1 | Status: SHIPPED | OUTPATIENT
Start: 2021-03-22 | End: 2021-06-10

## 2021-04-02 ENCOUNTER — HOSPITAL ENCOUNTER (OUTPATIENT)
Dept: MAMMOGRAPHY | Age: 56
Discharge: HOME | End: 2021-04-02
Attending: FAMILY MEDICINE
Payer: COMMERCIAL

## 2021-04-02 DIAGNOSIS — Z12.31 SCREENING MAMMOGRAM FOR HIGH-RISK PATIENT: ICD-10-CM

## 2021-04-02 PROCEDURE — 77067 SCR MAMMO BI INCL CAD: CPT

## 2021-04-19 DIAGNOSIS — E10.319 TYPE 1 DIABETES MELLITUS WITH RETINOPATHY OF BOTH EYES, MACULAR EDEMA PRESENCE UNSPECIFIED, UNSPECIFIED RETINOPATHY SEVERITY (HCC): ICD-10-CM

## 2021-04-19 RX ORDER — FLASH GLUCOSE SENSOR
KIT MISCELLANEOUS
Qty: 2 KIT | Refills: 0 | Status: SHIPPED | OUTPATIENT
Start: 2021-04-19 | End: 2021-06-01

## 2021-05-29 DIAGNOSIS — E10.319 TYPE 1 DIABETES MELLITUS WITH RETINOPATHY OF BOTH EYES, MACULAR EDEMA PRESENCE UNSPECIFIED, UNSPECIFIED RETINOPATHY SEVERITY (HCC): ICD-10-CM

## 2021-05-30 DIAGNOSIS — E10.319 TYPE 1 DIABETES MELLITUS WITH RETINOPATHY OF BOTH EYES, MACULAR EDEMA PRESENCE UNSPECIFIED, UNSPECIFIED RETINOPATHY SEVERITY (HCC): ICD-10-CM

## 2021-06-01 RX ORDER — FLASH GLUCOSE SENSOR
KIT MISCELLANEOUS
Qty: 2 KIT | Refills: 5 | Status: SHIPPED | OUTPATIENT
Start: 2021-06-01 | End: 2021-07-07 | Stop reason: ALTCHOICE

## 2021-06-01 RX ORDER — FLASH GLUCOSE SENSOR
KIT MISCELLANEOUS
Qty: 1 KIT | Refills: 0 | Status: SHIPPED | OUTPATIENT
Start: 2021-06-01 | End: 2021-07-02 | Stop reason: SDUPTHER

## 2021-06-03 ENCOUNTER — TELEPHONE (OUTPATIENT)
Dept: FAMILY MEDICINE CLINIC | Age: 56
End: 2021-06-03

## 2021-06-10 RX ORDER — IBUPROFEN 800 MG/1
TABLET ORAL
Qty: 60 TABLET | Refills: 1 | Status: SHIPPED | OUTPATIENT
Start: 2021-06-10 | End: 2021-08-09

## 2021-07-02 ENCOUNTER — OFFICE VISIT (OUTPATIENT)
Dept: FAMILY MEDICINE CLINIC | Age: 56
End: 2021-07-02
Payer: COMMERCIAL

## 2021-07-02 VITALS
WEIGHT: 192.6 LBS | HEART RATE: 83 BPM | HEIGHT: 62 IN | RESPIRATION RATE: 12 BRPM | TEMPERATURE: 98.3 F | OXYGEN SATURATION: 98 % | DIASTOLIC BLOOD PRESSURE: 73 MMHG | SYSTOLIC BLOOD PRESSURE: 144 MMHG | BODY MASS INDEX: 35.44 KG/M2

## 2021-07-02 DIAGNOSIS — E66.9 NON MORBID OBESITY: ICD-10-CM

## 2021-07-02 DIAGNOSIS — E78.2 MIXED HYPERLIPIDEMIA: ICD-10-CM

## 2021-07-02 DIAGNOSIS — Z51.81 ENCOUNTER FOR MEDICATION MONITORING: ICD-10-CM

## 2021-07-02 DIAGNOSIS — Z79.4 ENCOUNTER FOR LONG-TERM (CURRENT) USE OF INSULIN (HCC): ICD-10-CM

## 2021-07-02 DIAGNOSIS — I10 ESSENTIAL HYPERTENSION: Primary | ICD-10-CM

## 2021-07-02 DIAGNOSIS — E10.319 TYPE 1 DIABETES MELLITUS WITH RETINOPATHY OF BOTH EYES, MACULAR EDEMA PRESENCE UNSPECIFIED, UNSPECIFIED RETINOPATHY SEVERITY (HCC): ICD-10-CM

## 2021-07-02 DIAGNOSIS — K21.9 GASTROESOPHAGEAL REFLUX DISEASE WITHOUT ESOPHAGITIS: ICD-10-CM

## 2021-07-02 DIAGNOSIS — Z91.09 ENVIRONMENTAL ALLERGIES: ICD-10-CM

## 2021-07-02 LAB
ALBUMIN SERPL-MCNC: 3.4 G/DL (ref 3.5–5)
ALBUMIN/GLOB SERPL: 1.1 {RATIO} (ref 1.1–2.2)
ALP SERPL-CCNC: 130 U/L (ref 45–117)
ALT SERPL-CCNC: 26 U/L (ref 12–78)
ANION GAP SERPL CALC-SCNC: 4 MMOL/L (ref 5–15)
AST SERPL-CCNC: 16 U/L (ref 15–37)
BILIRUB SERPL-MCNC: 0.2 MG/DL (ref 0.2–1)
BILIRUB UR QL STRIP: NEGATIVE
BUN SERPL-MCNC: 11 MG/DL (ref 6–20)
BUN/CREAT SERPL: 14 (ref 12–20)
CALCIUM SERPL-MCNC: 9 MG/DL (ref 8.5–10.1)
CHLORIDE SERPL-SCNC: 111 MMOL/L (ref 97–108)
CHOLEST SERPL-MCNC: 244 MG/DL
CO2 SERPL-SCNC: 28 MMOL/L (ref 21–32)
CREAT SERPL-MCNC: 0.76 MG/DL (ref 0.55–1.02)
CREAT UR-MCNC: 220 MG/DL
ERYTHROCYTE [DISTWIDTH] IN BLOOD BY AUTOMATED COUNT: 13.4 % (ref 11.5–14.5)
GLOBULIN SER CALC-MCNC: 3 G/DL (ref 2–4)
GLUCOSE POC: 86 MG/DL
GLUCOSE SERPL-MCNC: 93 MG/DL (ref 65–100)
GLUCOSE UR-MCNC: NORMAL MG/DL
HBA1C MFR BLD HPLC: 12.7 %
HCT VFR BLD AUTO: 40.2 % (ref 35–47)
HDLC SERPL-MCNC: 61 MG/DL
HDLC SERPL: 4 {RATIO} (ref 0–5)
HGB BLD-MCNC: 12 G/DL (ref 11.5–16)
KETONES P FAST UR STRIP-MCNC: NEGATIVE MG/DL
LDLC SERPL CALC-MCNC: 153 MG/DL (ref 0–100)
MCH RBC QN AUTO: 25.1 PG (ref 26–34)
MCHC RBC AUTO-ENTMCNC: 29.9 G/DL (ref 30–36.5)
MCV RBC AUTO: 83.9 FL (ref 80–99)
MICROALBUMIN UR-MCNC: 6.2 MG/DL
MICROALBUMIN/CREAT UR-RTO: 28 MG/G (ref 0–30)
NRBC # BLD: 0 K/UL (ref 0–0.01)
NRBC BLD-RTO: 0 PER 100 WBC
PH UR STRIP: 5.5 [PH] (ref 4.6–8)
PLATELET # BLD AUTO: 249 K/UL (ref 150–400)
PMV BLD AUTO: 12.3 FL (ref 8.9–12.9)
POTASSIUM SERPL-SCNC: 4 MMOL/L (ref 3.5–5.1)
PROT SERPL-MCNC: 6.4 G/DL (ref 6.4–8.2)
PROT UR QL STRIP: NORMAL
RBC # BLD AUTO: 4.79 M/UL (ref 3.8–5.2)
SODIUM SERPL-SCNC: 143 MMOL/L (ref 136–145)
SP GR UR STRIP: 1.02 (ref 1–1.03)
TRIGL SERPL-MCNC: 150 MG/DL (ref ?–150)
UA UROBILINOGEN AMB POC: NORMAL (ref 0.2–1)
URINALYSIS CLARITY POC: CLEAR
URINALYSIS COLOR POC: YELLOW
URINE BLOOD POC: NEGATIVE
URINE LEUKOCYTES POC: NORMAL
URINE NITRITES POC: NEGATIVE
VLDLC SERPL CALC-MCNC: 30 MG/DL
WBC # BLD AUTO: 6.4 K/UL (ref 3.6–11)

## 2021-07-02 PROCEDURE — 83036 HEMOGLOBIN GLYCOSYLATED A1C: CPT | Performed by: FAMILY MEDICINE

## 2021-07-02 PROCEDURE — 82947 ASSAY GLUCOSE BLOOD QUANT: CPT | Performed by: FAMILY MEDICINE

## 2021-07-02 PROCEDURE — 81003 URINALYSIS AUTO W/O SCOPE: CPT | Performed by: FAMILY MEDICINE

## 2021-07-02 PROCEDURE — 99214 OFFICE O/P EST MOD 30 MIN: CPT | Performed by: FAMILY MEDICINE

## 2021-07-02 RX ORDER — PANTOPRAZOLE SODIUM 40 MG/1
TABLET, DELAYED RELEASE ORAL
Qty: 30 TABLET | Refills: 11 | Status: SHIPPED | OUTPATIENT
Start: 2021-07-02

## 2021-07-02 RX ORDER — MINERAL OIL
ENEMA (ML) RECTAL
Qty: 30 TABLET | Refills: 6 | Status: SHIPPED | OUTPATIENT
Start: 2021-07-02

## 2021-07-02 RX ORDER — INSULIN GLARGINE 100 [IU]/ML
110 INJECTION, SOLUTION SUBCUTANEOUS 2 TIMES DAILY
Qty: 3 PEN | Refills: 3 | Status: SHIPPED | OUTPATIENT
Start: 2021-07-02 | End: 2021-07-07 | Stop reason: CLARIF

## 2021-07-02 RX ORDER — AMLODIPINE BESYLATE 5 MG/1
5 TABLET ORAL DAILY
Qty: 30 TABLET | Refills: 3 | Status: SHIPPED | OUTPATIENT
Start: 2021-07-02 | End: 2021-10-01

## 2021-07-02 RX ORDER — LISINOPRIL 40 MG/1
40 TABLET ORAL DAILY
Qty: 90 TABLET | Refills: 3 | Status: SHIPPED | OUTPATIENT
Start: 2021-07-02

## 2021-07-02 NOTE — PROGRESS NOTES
HISTORY OF PRESENT ILLNESS  Amna Webber is a 54 y.o. female. Follow up on chronic medical problems. Overall has been feeling well. Has not been in for her labs since September 2019. She is teleworking. Lives with her mother and her son. They have mostly been at home. She is taking the precautionary measures with sanitizing her home regularly. Wearing mask when she goes out to the drug store. Cardiovascular Review:  The patient has hypertension and hyperlipidemia. Diet and Lifestyle: generally follows a low fat low cholesterol diet, generally follows a low sodium diet, exercises sporadically, nonsmoker  Home BP Monitoring: is not measured at home. Pertinent ROS: taking medications as instructed, no medication side effects noted, no TIA's, no chest pain on exertion, no dyspnea on exertion, no swelling of ankles, no palpitations, no muscle aches or pain. Diabetes Mellitus:  She has diabetes mellitus type 1 dx at the age of 25. She has been on insulin since her diagnoses. She has retinopathy. Last a1c was 10% in 9/2019. She has not had nay further follow up with labs sincere then d/t the pandemic. Diabetic ROS - medication compliance: compliant most of the time, diabetic diet compliance: compliant most of the time, home glucose monitoring: is performed regularly with the [facility] system. She is using her SS humalog insulin, she states that her BS are averaging in the 170s but sometimes higher. BS are sometimes low at different times of the day. Admits that she has been doing more \"snacking\" she is has been more at home. She know that her a1c will still be high. She has  been exercising some with walking 2 to 3 times a week. Further diabetic ROS: no chest pain, dyspnea or TIA's, no numbness, has some  tingling in the extremities which has been stable. Vision has been stable. Last eye exam was this past January.   Has nocturia about 1 to 2 times but drinks close to bedtime and thinks that it is more related to this than her BS. Lab review: orders written for new lab studies as appropriate; see orders. Patient Active Problem List   Diagnosis Code    Gastroesophageal reflux disease without esophagitis K21.9    History of hysterectomy for benign disease Z90.710    Diabetic retinopathy ([facility] E11.319    Type 1 diabetes mellitus with retinopathy of right eye without macular edema ([address] E10.319    Encounter for long-term (current) use of insulin ([address] Z79.4    Encounter for medication monitoring Z51.81    Essential hypertension I10    Mixed hyperlipidemia E78.2    Chronic constipation K59.09    Severe obesity (BMI 35.0-39. 9) with comorbidity ([address] E66.01    Uncontrolled type 1 diabetes mellitus with retinopathy of both eyes ([facility] [address] [facility] E10.319, E10.65    Type 2 diabetes with nephropathy (HCC) E11.21       Current Outpatient Medications   Medication Sig Dispense Refill    fexofenadine (ALLEGRA) 180 mg tablet TAKE 1 TABLET BY MOUTH DAILY AS NEEDED FOR ALLERGIES OR RHINITIS 30 Tablet 6    insulin glargine (Lantus Solostar U-100 Insulin) 100 unit/mL (3 mL) inpn 110 Units by SubCUTAneous route two (2) times a day. 3 Pen 3    pantoprazole (PROTONIX) 40 mg tablet TAKE 1 TABLET BY MOUTH ONCE DAILY 30 Tablet 11    ibuprofen (MOTRIN) 800 mg tablet TAKE 1 TABLET BY MOUTH TWICE DAILY AS NEEDED FOR PAIN 60 Tablet 1    flash glucose sensor (FreeStyle Soo 14 Day Sensor) kit USE AS DIRECTED TO MONITOR BLOOD SUGAR 2 Kit 5    Ventolin HFA 90 mcg/actuation inhaler INHALE 2 PUFFS BY MOUTH EVERY 4 HOURS AS NEEDED FOR WHEEZING OR SHORTNESS OF BREATH 1 Inhaler 11    loratadine (Claritin) 10 mg tablet Take 10 mg by mouth daily as needed for Allergies.       cyanocobalamin (VITAMIN B12) 1,000 mcg/mL injection ADMINISTER 1 ML IN THE MUSCLE EVERY 30 DAYS 10 mL 1    atorvastatin (LIPITOR) 80 mg tablet TAKE 1 TABLET BY MOUTH DAILY 30 Tab 11    topiramate (TOPAMAX) 50 mg tablet TAKE 1 TABLET BY MOUTH TWICE DAILY 60 Tab 5    hydroCHLOROthiazide (HYDRODIURIL) 25 mg tablet TAKE 1 TABLET BY MOUTH DAILY 90 Tab 3    flash glucose scanning reader (FREESTYLE JALEEL 14 DAY READER) [facility] USE TO MONITOR BLOOD SUGAR MULTIPLE TIMES DAILY AS DIRECTED , E10.319 2 Each 5    insulin lispro (HUMALOG) 100 unit/mL kwikpen For Blood Sugar  use 12 units, 151-200 use 18 units, 201-250 use 22 units, 251-300 use 28units and >301 use 32 units  Indications: type 2 diabetes mellitus 1 Package 0    furosemide (LASIX) 20 mg tablet Take 1 Tab by mouth daily. 30 Tab 6    fluticasone propionate (FLONASE) 50 mcg/actuation nasal spray INSTILL 2 SPRAYS IN EACH NOSTRIL ONCE DAILY AS NEEDED FOR NASAL CONGESTION 1 Bottle 6    ezetimibe (ZETIA) 10 mg tablet TAKE 1 TABLET BY MOUTH DAILY 30 Tab 11    lisinopril (PRINIVIL, ZESTRIL) 20 mg tablet Take 2 Tabs by mouth daily. 180 Tab 1    Insulin Needles, Disposable, (PER PEN NEEDLE) 32 gauge x 5/32\" ndle Use to inject insulin 4 times daily as directed. 100 Pen Needle 11    linaclotide (LINZESS) 290 mcg cap capsule Take 290 mcg by mouth Daily (before breakfast).          Allergies   Allergen Reactions    Lasix [Furosemide] Hives    Percocet [Oxycodone-Acetaminophen] Other (comments)     AMS         Past Medical History:   Diagnosis Date    Diabetes ([address]     Diabetic retinopathy ([address]     right eye     Gestational diabetes     Hypercholesterolemia     Hypertension     Type 1 diabetes ([address]          Past Surgical History:   Procedure Laterality Date    HX BREAST REDUCTION Bilateral     HX CATARACT REMOVAL  2018    left eye    HX  SECTION  1990    HX  SECTION  2000    HX COLONOSCOPY      HX HEENT  2016    right eye surgery- repaired blood vessel    HX HEENT  2017    left eye surgery- removal of scar tissue    HX HEENT  2018    left eye-laser    HX HEENT  10/23/2018    left eye injection    HX HEENT  2018    left eye intravitreal injection    HX HEENT  01/07/2019    left eye intravitreal injection    HX HYSTERECTOMY      partial         Family History   Problem Relation Age of Onset    Hypertension Mother     Diabetes Father     No Known Problems Brother     Suicide Brother     Asthma Daughter     Asthma Son     Psoriasis Son        Social History     Tobacco Use    Smoking status: Never Smoker    Smokeless tobacco: Never Used   Substance Use Topics    Alcohol use: No     Alcohol/week: 0.0 standard drinks        Lab Results   Component Value Date/Time    WBC 6.2 01/28/2019 09:10 AM    HGB 12.8 01/28/2019 09:10 AM    HCT 40.3 01/28/2019 09:10 AM    PLATELET 404 96/60/1821 09:10 AM    MCV 80 01/28/2019 09:10 AM     Lab Results   Component Value Date/Time    Cholesterol, total 168 09/20/2019 03:15 PM    HDL Cholesterol 57 09/20/2019 03:15 PM    LDL, calculated 86 09/20/2019 03:15 PM    Triglyceride 125 09/20/2019 03:15 PM    CHOL/HDL Ratio 3.1 02/25/2010 08:25 AM     Lab Results   Component Value Date/Time    TSH 0.942 05/16/2017 03:47 PM      Lab Results   Component Value Date/Time    Sodium 144 09/20/2019 03:15 PM    Potassium 4.1 09/20/2019 03:15 PM    Chloride 102 09/20/2019 03:15 PM    CO2 26 09/20/2019 03:15 PM    Anion gap 8 02/25/2010 08:25 AM    Glucose 128 (H) 09/20/2019 03:15 PM    BUN 10 09/20/2019 03:15 PM    Creatinine 1.08 (H) 09/20/2019 03:15 PM    BUN/Creatinine ratio 9 09/20/2019 03:15 PM    GFR est AA 68 09/20/2019 03:15 PM    GFR est non-AA 59 (L) 09/20/2019 03:15 PM    Calcium 9.4 09/20/2019 03:15 PM    Bilirubin, total <0.2 09/20/2019 03:15 PM    ALT (SGPT) 22 09/20/2019 03:15 PM    Alk.  phosphatase 131 (H) 09/20/2019 03:15 PM    Protein, total 6.4 09/20/2019 03:15 PM    Albumin 4.2 09/20/2019 03:15 PM    Globulin 3.3 02/25/2010 08:25 AM    A-G Ratio 1.9 09/20/2019 03:15 PM      Lab Results   Component Value Date/Time    Hemoglobin A1c 11.5 (H) 02/25/2010 08:25 AM    Hemoglobin A1c (POC) 10.0 09/20/2019 03:12 PM         Review of Systems   Constitutional: Negative for malaise/fatigue. HENT: Negative for congestion. Eyes: Negative for blurred vision. Respiratory: Negative for cough and shortness of breath. Cardiovascular: Negative for chest pain, palpitations and leg swelling. Gastrointestinal: Negative for abdominal pain, constipation and heartburn. Genitourinary: Negative for dysuria, frequency and urgency. Musculoskeletal: Negative for back pain and joint pain. Neurological: Negative for dizziness, tingling and headaches. Endo/Heme/Allergies: Negative for environmental allergies. Psychiatric/Behavioral: Negative for depression. The patient does not have insomnia. Physical Exam  Vitals and nursing note reviewed. Constitutional:       Appearance: Normal appearance. She is well-developed. Comments: BP (!) 144/73 (BP 1 Location: Right arm, BP Patient Position: Sitting, BP Cuff Size: Adult)   Pulse 83   Temp 98.3 °F (36.8 °C) (Oral)   Resp 12   Ht 5' 2\" (1.575 m)   Wt 192 lb 9.6 oz (87.4 kg)   LMP  (LMP Unknown)   SpO2 98%   BMI 35.23 kg/m²    HENT:      Right Ear: Tympanic membrane and ear canal normal.      Left Ear: Tympanic membrane and ear canal normal.      Nose: No mucosal edema or rhinorrhea. Neck:      Thyroid: No thyromegaly. Cardiovascular:      Rate and Rhythm: Normal rate and regular rhythm. Heart sounds: Normal heart sounds. No gallop. Pulmonary:      Effort: Pulmonary effort is normal.      Breath sounds: Normal breath sounds. Abdominal:      General: Bowel sounds are normal.      Palpations: Abdomen is soft. There is no mass. Tenderness: There is no abdominal tenderness. Musculoskeletal:         General: No swelling. Normal range of motion. Cervical back: Normal range of motion and neck supple. Lymphadenopathy:      Cervical: No cervical adenopathy. Skin:     General: Skin is warm and dry.    Neurological:      General: No focal deficit present. Mental Status: She is alert and oriented to person, place, and time. Psychiatric:         Mood and Affect: Mood normal.         ASSESSMENT and PLAN  Diagnoses and all orders for this visit:    1. Essential hypertension  Discussed sodium restriction, high k rich diet, maintaining ideal body weight and regular exercise program such as daily walking 30 min perday 4-5 times per week, as physiologic means to achieve blood pressure control. Medication compliance advised. -     Add amLODIPine (NORVASC) 5 mg tablet; Take 1 Tablet by mouth daily. -     lisinopriL (PRINIVIL, ZESTRIL) 40 mg tablet; Take 1 Tablet by mouth daily. 2. Type 1 diabetes mellitus with retinopathy of both eyes, macular edema presence unspecified, unspecified retinopathy severity (HCC)  a1c 12.7%. She is interested in the insulin pump. Will refer to [facility] to review with pt and hopefully get her set up  -     AMB POC HEMOGLOBIN A1C  -     AMB POC GLUCOSE, QUANTITATIVE, BLOOD  -     MICROALBUMIN, UR, RAND W/ MICROALB/CREAT RATIO; Future  -     AMB POC URINALYSIS DIP STICK AUTO W/O MICRO  -     insulin glargine (Lantus Solostar U-100 Insulin) 100 unit/mL (3 mL) inpn; 110 Units by SubCUTAneous route two (2) times a day. 3. Mixed hyperlipidemia  -     LIPID PANEL; Future    4. Gastroesophageal reflux disease without esophagitis  -     Refill pantoprazole (PROTONIX) 40 mg tablet; TAKE 1 TABLET BY MOUTH ONCE DAILY    5. Environmental allergies  -     Refill fexofenadine (ALLEGRA) 180 mg tablet; TAKE 1 TABLET BY MOUTH DAILY AS NEEDED FOR ALLERGIES OR RHINITIS    6. Encounter for long-term (current) use of insulin (HCC)//  7. Encounter for medication monitoring  -     METABOLIC PANEL, COMPREHENSIVE; Future  -     CBC W/O DIFF; Future    8. Non morbid obesity  I have reviewed/discussed the above normal BMI with the patient.   I have recommended the following interventions: dietary management education, guidance, and counseling . Follow-up and Dispositions    · Return in about 1 month (around 8/2/2021). reviewed diet, exercise and weight control  cardiovascular risk and specific lipid/LDL goals reviewed  reviewed medications and side effects in detail  specific diabetic recommendations: referral to Diabetic Education department, low cholesterol diet, weight control and daily exercise discussed, all medications, side effects and compliance discussed carefully, foot care discussed and Podiatry visits discussed, annual eye examinations at Ophthalmology discussed and glycohemoglobin and other lab monitoring discussed     I have discussed diagnosis listed in this note with pt and/or family. I have discussed treatment plans and options and the risk/benefit analysis of those options, including safe use of medications and possible medication side effects. Through the use of shared decision making we have agreed to the above plan. The patient has received an after-visit summary and questions were answered concerning future plans and follow up. Advise pt of any urgent changes then to proceed to the ER.

## 2021-07-02 NOTE — PROGRESS NOTES
Chief Complaint   Patient presents with    Cholesterol Problem    Diabetes       1. Have you been to the ER, urgent care clinic since your last visit? Hospitalized since your last visit? No    2. Have you seen or consulted any other health care providers outside of the [address] [place] [address] since your last visit? Include any pap smears or colon screening.  No     PAP - 9/13/2017 , pt aware overdue    Colonoscopy - 8/21/2019 Dr. Keila Munroe, repstevet 10 years    Mammo - 4/2/21    Microalbumin - 9/20/2019    Eye Exam - 08/24/20    Health Maintenance Due   Topic Date Due    Foot Exam Q1  09/10/2019    PAP AKA CERVICAL CYTOLOGY  09/13/2020    A1C test (Diabetic or Prediabetic)  09/20/2020    MICROALBUMIN Q1  09/20/2020    Lipid Screen  09/20/2020

## 2021-07-06 NOTE — PROGRESS NOTES
Pt presents for diabetes management and education, referred by Dr David Lake - recent A1c 12.7%.  Dx of DM1 with retinopathy and has not really ever been controlled. Michael Arellano is interested in the insulin pump.    Also needs education with her diet.     Got a PA in for her Lantus and will address this today as well. Pt presents feeling well overall, and is really wanting to get better control of her diabetes. She is concerned about the most recent A1c. 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 Says with the merger she has new insurance; shows me her [employer] card she uses for medications now. She is aware about the Lantus issue as the pharmacist called her  Reviewed her formulary and it does look like Lantus is not preferred, basaglar is the glargine product they prefer, but other insulins (including more concentrated ones) are also preferred /c overed     Still using Soo sensor - has 14 day system and uses the reader (not her phone) - she does not have the test strips that go with this reader to double check her sugars with a fingerstick when she needs to   [facility] (see below)     Insulin regimen:   Confirms Lantus 110 units twice daily.  Does not miss doses per patient  Humalog depends on scale* - on average 15-20 units total in a day - mostly in the evening - she doesn't give the doses as high that are noted on the scale in her med list   Does not use carbs to adjust humalog, only scale, but again, not using as much as noted on scale   Will often times give a smaller dose of humalog and then give it again if number does not come down, or if it goes up higher after eating - then will give more   *Scale noted in med list: For Blood Sugar  use 12 units, 151-200 use 18 units, 201-250 use 22 units, 251-300 use 28units and >301 use 32 units    Diet:   Eating has changed - does not have a large appetite  Portions are smaller B: cereal or one slice of toast  L: non starchy veggie  D: shrimp, chicken hot dogs, beans     Diabetes history:   Reviewed chart and spoke to patient about her diabetes history  She used to see endocrine (Dr. Lashae Carcamo) but has not been back in what seems like at least 5 years   She had a c peptide and BERE antibodies drawn in 2017 by Dr. Lashae Carcamo [facility] [phone])  C peptide is low, less than 0.1 and BERE 65 is 80 which is high   May be consistent with  ZHOU - will follow up and see if we can get the records from endocrine and encourage her to go back. A/P:  Diabetes is uncontrolled and has been for some time but pt is engaged and motivated to get it under control. Having some hypoglycemia overnight / early AM but also some high readings, likely as on too much basal insulin, and high spikes likely as need more prandial insulin with meals. Regarding CGM - gave [facility] reader as she would benefit from having alarms. Will send order for [employer] sensors and neoprecision test strips to use with reader when needs to double check CGM reading. Since Lantus needs to be changed, will use covered concentrated insulin as may be improved absorption and also once daily vs. Twice daily   Tresiba 200 untis / mL - reduce by 20% when converting from glargine and reduced by 20% as she is getting too much basal insulin (lows in the early AM) and not enough prandial insulin - spikes in the late afternoon and evening. Sample tresiba pen reviewed with patient and provided to get her started on this regimen. Samples logged, documented and signed out per policy.     Humalog 5 to 15 units based on blood sugar and carb content of meal. Discussed situations that would warrant dose to use - asked her to write down what her sugar is and what she's eating and how many units she gave      Reviewed treatment of hypoglycemia and A1c goals to prevent further complications of diabetes     Provided information on insulin pumps; and she would like to get further information and learn more about the medtronic pump. Will talk to [employer] so they can set up a meeting with her so show her the pump - gave her written materials on the pump. Will need to think through this given her high TDD; discussed with patient. Discussed GLP1 agonist in DM1 / ZHOU with elevated BMI - will research this and consider for future if indicated. May be able to decrease the TDD in this setting. Called pharmacy to confirm [place] covered; sensors for Saúl 2 around $75 which pharmacist said is the same price as Soo 14 day sensors. Need PA for paty precision strips. Will work on that. Will call patient in 3-4 days to follow up on sugars. Gave her my contact information to call if any questions. Patient verbalized understanding of information presented. Answered all of the patient's questions. Next visit discuss with patient re referring back to endocrine. Supriya Rodriguez, PHARMD, [address] in place:  Yes   Recommendation Provided To: Patient/Caregiver: 8 via In person   Intervention Detail: Device Training, Discontinued Rx: 1, reason: Cost/Formulary Change, Dose Adjustment: 2, reason: Therapy Optimization, New Rx: 3, reason: Needs Additional Therapy and Patient Access Assistance/Sample Provided   Gap Closed?: No   Total # of Interventions Recommended: 8   Total # of Interventions Accepted: 8   Intervention Accepted By: Patient/Caregiver: 8   Time Spent (min): 120

## 2021-07-07 ENCOUNTER — OFFICE VISIT (OUTPATIENT)
Dept: FAMILY MEDICINE CLINIC | Age: 56
End: 2021-07-07

## 2021-07-07 DIAGNOSIS — E10.319 TYPE 1 DIABETES MELLITUS WITH RETINOPATHY OF BOTH EYES, MACULAR EDEMA PRESENCE UNSPECIFIED, UNSPECIFIED RETINOPATHY SEVERITY (HCC): ICD-10-CM

## 2021-07-07 RX ORDER — BLOOD SUGAR DIAGNOSTIC
STRIP MISCELLANEOUS
Qty: 50 STRIP | Refills: 2 | Status: SHIPPED | OUTPATIENT
Start: 2021-07-07

## 2021-07-07 RX ORDER — INSULIN DEGLUDEC INJECTION 200 U/ML
130 INJECTION, SOLUTION SUBCUTANEOUS DAILY
Qty: 6 PEN | Refills: 3 | Status: SHIPPED | OUTPATIENT
Start: 2021-07-07 | End: 2021-07-08 | Stop reason: SDUPTHER

## 2021-07-07 RX ORDER — FLASH GLUCOSE SENSOR
KIT MISCELLANEOUS
Qty: 2 KIT | Refills: 11 | Status: SHIPPED | OUTPATIENT
Start: 2021-07-07 | End: 2022-07-28

## 2021-07-08 RX ORDER — INSULIN LISPRO 100 [IU]/ML
INJECTION, SOLUTION INTRAVENOUS; SUBCUTANEOUS
Qty: 1 PACKAGE | Refills: 0
Start: 2021-07-08 | End: 2021-07-25

## 2021-07-08 RX ORDER — INSULIN DEGLUDEC INJECTION 200 U/ML
130 INJECTION, SOLUTION SUBCUTANEOUS DAILY
Qty: 1 PEN | Refills: 0 | Status: SHIPPED | COMMUNITY
Start: 2021-07-08 | End: 2021-07-14 | Stop reason: SDUPTHER

## 2021-07-09 ENCOUNTER — OFFICE VISIT (OUTPATIENT)
Dept: FAMILY MEDICINE CLINIC | Age: 56
End: 2021-07-09

## 2021-07-09 DIAGNOSIS — E10.319 TYPE 1 DIABETES MELLITUS WITH RETINOPATHY OF BOTH EYES, MACULAR EDEMA PRESENCE UNSPECIFIED, UNSPECIFIED RETINOPATHY SEVERITY (HCC): Primary | ICD-10-CM

## 2021-07-14 RX ORDER — INSULIN DEGLUDEC INJECTION 200 U/ML
130 INJECTION, SOLUTION SUBCUTANEOUS DAILY
Qty: 18 ML | Refills: 1 | Status: SHIPPED | OUTPATIENT
Start: 2021-07-14 | End: 2021-08-18 | Stop reason: SDUPTHER

## 2021-07-14 NOTE — PROGRESS NOTES
Today, Pt met with Fiona Lopez, from [employer], to be shown the Medtronic insulin pump and review insurance coverage / costs. After the meeting, pt decided to move forward to look into coverage of the pump / supplies    Addendum:  Called patient to follow up on new insulin regimen and meeting with [employer]  She thinks the Laruth Pop is doing well and sugars are doing better. She only got 2 pens however, and should get 6 pens for a one month supply  Will send new order to the pharmacy and note this and hopefully she can get the correct amount  She has some concerns about the cost of the insulin pump / monthly supplies and is working with [employer] to make sure she has this information correct / assessed before moving forward with the pump    She has PCP follow up on 8/3    Will follow up with patient next week regarding pump and also Breanna Pop / her blood sugars. Patient verbalized understanding of information presented. Answered all of the patient's questions.       Dinesh Bruno

## 2021-07-21 RX ORDER — FLASH GLUCOSE SCANNING READER
EACH MISCELLANEOUS
Qty: 2 EACH | Refills: 5 | Status: SHIPPED | OUTPATIENT
Start: 2021-07-21 | End: 2022-02-14

## 2021-07-24 DIAGNOSIS — E10.319 TYPE 1 DIABETES MELLITUS WITH RETINOPATHY OF BOTH EYES, MACULAR EDEMA PRESENCE UNSPECIFIED, UNSPECIFIED RETINOPATHY SEVERITY (HCC): ICD-10-CM

## 2021-07-25 RX ORDER — INSULIN LISPRO 100 [IU]/ML
INJECTION, SOLUTION INTRAVENOUS; SUBCUTANEOUS
Qty: 30 ML | Refills: 3 | Status: SHIPPED | OUTPATIENT
Start: 2021-07-25 | End: 2021-08-18 | Stop reason: SDUPTHER

## 2021-07-30 ENCOUNTER — DOCUMENTATION ONLY (OUTPATIENT)
Dept: FAMILY MEDICINE CLINIC | Age: 56
End: 2021-07-30

## 2021-08-03 ENCOUNTER — DOCUMENTATION ONLY (OUTPATIENT)
Dept: FAMILY MEDICINE CLINIC | Age: 56
End: 2021-08-03

## 2021-08-03 NOTE — PROGRESS NOTES
Received fax from [employer] that [address] [place] [address] Drive was approved from 8/2/2021 through 8/2/2022.

## 2021-08-09 RX ORDER — IBUPROFEN 800 MG/1
TABLET ORAL
Qty: 60 TABLET | Refills: 1 | Status: SHIPPED | OUTPATIENT
Start: 2021-08-09 | End: 2021-12-23

## 2021-08-18 ENCOUNTER — OFFICE VISIT (OUTPATIENT)
Dept: FAMILY MEDICINE CLINIC | Age: 56
End: 2021-08-18
Payer: COMMERCIAL

## 2021-08-18 VITALS
HEART RATE: 85 BPM | WEIGHT: 202.8 LBS | RESPIRATION RATE: 16 BRPM | HEIGHT: 62 IN | TEMPERATURE: 98.7 F | SYSTOLIC BLOOD PRESSURE: 132 MMHG | DIASTOLIC BLOOD PRESSURE: 72 MMHG | OXYGEN SATURATION: 97 % | BODY MASS INDEX: 37.32 KG/M2

## 2021-08-18 DIAGNOSIS — Z79.4 ENCOUNTER FOR LONG-TERM (CURRENT) USE OF INSULIN (HCC): ICD-10-CM

## 2021-08-18 DIAGNOSIS — Z51.81 ENCOUNTER FOR MEDICATION MONITORING: ICD-10-CM

## 2021-08-18 DIAGNOSIS — E78.2 MIXED HYPERLIPIDEMIA: ICD-10-CM

## 2021-08-18 DIAGNOSIS — E10.319 TYPE 1 DIABETES MELLITUS WITH RETINOPATHY OF BOTH EYES, MACULAR EDEMA PRESENCE UNSPECIFIED, UNSPECIFIED RETINOPATHY SEVERITY (HCC): ICD-10-CM

## 2021-08-18 DIAGNOSIS — I10 ESSENTIAL HYPERTENSION: Primary | ICD-10-CM

## 2021-08-18 DIAGNOSIS — Z23 ENCOUNTER FOR IMMUNIZATION: ICD-10-CM

## 2021-08-18 PROCEDURE — 90750 HZV VACC RECOMBINANT IM: CPT | Performed by: FAMILY MEDICINE

## 2021-08-18 PROCEDURE — 90471 IMMUNIZATION ADMIN: CPT | Performed by: FAMILY MEDICINE

## 2021-08-18 PROCEDURE — 99213 OFFICE O/P EST LOW 20 MIN: CPT | Performed by: FAMILY MEDICINE

## 2021-08-18 RX ORDER — INSULIN DEGLUDEC INJECTION 200 U/ML
120 INJECTION, SOLUTION SUBCUTANEOUS DAILY
Qty: 18 ML | Refills: 1
Start: 2021-08-18 | End: 2021-08-31

## 2021-08-18 RX ORDER — INSULIN LISPRO 100 [IU]/ML
INJECTION, SOLUTION INTRAVENOUS; SUBCUTANEOUS
Qty: 30 ML | Refills: 3
Start: 2021-08-18 | End: 2021-10-25 | Stop reason: ALTCHOICE

## 2021-08-18 NOTE — PROGRESS NOTES
Chief Complaint   Patient presents with    Hypertension     1 month folllow up         Verbal order received per Dr. Edmundo Dominique- Shingrix IM-VORB. Pt received Shingrix IM in left deltoid without any difficulty. 1. Have you been to the ER, urgent care clinic since your last visit? Hospitalized since your last visit? no    2. Have you seen or consulted any other health care providers outside of the [address] [place] [address] since your last visit? Include any pap smears or colon screening.  no

## 2021-08-18 NOTE — PROGRESS NOTES
HISTORY OF PRESENT ILLNESS  Linda Zee is a 54 y.o. female. HPI   1 month follow up on BP and BS. Overall has been feeling well. Cardiovascular Review:  The patient has hypertension and hyperlipidemia. Diet and Lifestyle: generally follows a low fat low cholesterol diet, generally follows a low sodium diet, exercises sporadically, nonsmoker  Home BP Monitoring: is not measured at home. Pertinent ROS: taking medications as instructed, no medication side effects noted, no TIA's, no chest pain on exertion, no dyspnea on exertion, no swelling of ankles, no palpitations, no muscle aches or pain. Diabetes Mellitus:  She has diabetes mellitus type 1 dx at the age of 25. She has been on insulin since her diagnoses. She has retinopathy. Met with Priyank Head and was change to tresiba for her long acting insulin   Her BS have been much better. She has been having lows in the 46s. Diabetic ROS - medication compliance: compliant most of the time, diabetic diet compliance: compliant most of the time, home glucose monitoring: is performed regularly with the [address] system. She has  been exercising some with walking 2 to 3 times a week. Further diabetic ROS: no chest pain, dyspnea or TIA's, no numbness, has some  tingling in the extremities which has been stable. Vision has been stable. Last eye exam was this past January. Has nocturia about 1 to 2 times but drinks close to bedtime and thinks that it is more related to this than her BS.      Patient Active Problem List   Diagnosis Code    Gastroesophageal reflux disease without esophagitis K21.9    History of hysterectomy for benign disease Z90.710    Diabetic retinopathy ([address] E11.319    Type 1 diabetes mellitus with retinopathy of right eye without macular edema ([address] E10.319    Encounter for long-term (current) use of insulin ([address] Z79.4    Encounter for medication monitoring Z51.81    Essential hypertension I10    Mixed hyperlipidemia E78.2    Chronic constipation K59.09    Severe obesity (BMI 35.0-39. 9) with comorbidity ([email or web address] E66.01    Uncontrolled type 1 diabetes mellitus with retinopathy of both eyes ([facility] E10.319, E10.65    Type 2 diabetes with nephropathy ([facility]) E11.21       Current Outpatient Medications   Medication Sig Dispense Refill    ibuprofen (MOTRIN) 800 mg tablet TAKE 1 TABLET BY MOUTH TWICE DAILY 60 Tablet 1    insulin lispro (HUMALOG) 100 unit/mL kwikpen INJECT UNDER THE SKIN BASED ON SLIDING SCALE: =30 UNITS, 151-200=35 UNITS, 201-250=40 UNITS, 251-300=45 UNITS,>301=50 UNITS 30 mL 3    FreeStyle Soo 14 Day Jennings misc USE EVERY 14 DAYS TO TEST BLOOD SUGAR 2 Each 5    insulin degludec (Tresiba FlexTouch U-200) 200 unit/mL (3 mL) inpn pen 130 Units by SubCUTAneous route daily. 18 mL 1    glucose blood VI test strips (FreeStyle Precision Justice Strips) strip Use to check blood sugar with fingerstick when Saúl advises you to do so. 50 Strip 2    flash glucose sensor (FreeStyle Soo 2 Sensor) kit USE AS DIRECTED TO MONITOR BLOOD SUGAR MULTIPLE TIMES DAILY - CGM 2 Kit 11    fexofenadine (ALLEGRA) 180 mg tablet TAKE 1 TABLET BY MOUTH DAILY AS NEEDED FOR ALLERGIES OR RHINITIS 30 Tablet 6    pantoprazole (PROTONIX) 40 mg tablet TAKE 1 TABLET BY MOUTH ONCE DAILY 30 Tablet 11    amLODIPine (NORVASC) 5 mg tablet Take 1 Tablet by mouth daily. 30 Tablet 3    lisinopriL (PRINIVIL, ZESTRIL) 40 mg tablet Take 1 Tablet by mouth daily. 90 Tablet 3    Ventolin HFA 90 mcg/actuation inhaler INHALE 2 PUFFS BY MOUTH EVERY 4 HOURS AS NEEDED FOR WHEEZING OR SHORTNESS OF BREATH 1 Inhaler 11    loratadine (Claritin) 10 mg tablet Take 10 mg by mouth daily as needed for Allergies.       cyanocobalamin (VITAMIN B12) 1,000 mcg/mL injection ADMINISTER 1 ML IN THE MUSCLE EVERY 30 DAYS 10 mL 1    atorvastatin (LIPITOR) 80 mg tablet TAKE 1 TABLET BY MOUTH DAILY 30 Tab 11    topiramate (TOPAMAX) 50 mg tablet TAKE 1 TABLET BY MOUTH TWICE DAILY 60 Tab 5    furosemide (LASIX) 20 mg tablet Take 1 Tab by mouth daily. 30 Tab 6    fluticasone propionate (FLONASE) 50 mcg/actuation nasal spray INSTILL 2 SPRAYS IN EACH NOSTRIL ONCE DAILY AS NEEDED FOR NASAL CONGESTION 1 Bottle 6    ezetimibe (ZETIA) 10 mg tablet TAKE 1 TABLET BY MOUTH DAILY 30 Tab 11    Insulin Needles, Disposable, (PER PEN NEEDLE) 32 gauge x 5/32\" ndle Use to inject insulin 4 times daily as directed. 100 Pen Needle 11    linaclotide (LINZESS) 290 mcg cap capsule Take 290 mcg by mouth Daily (before breakfast).          Allergies   Allergen Reactions    Lasix [Furosemide] Hives    Percocet [Oxycodone-Acetaminophen] Other (comments)     AMS       Past Medical History:   Diagnosis Date    Diabetes ([address]     Diabetic retinopathy ([address]     right eye     Gestational diabetes     Hypercholesterolemia     Hypertension     Type 1 diabetes ([address]        Past Surgical History:   Procedure Laterality Date    HX BREAST REDUCTION Bilateral 1984    HX CATARACT REMOVAL  2018    left eye    HX  SECTION  1990    HX  SECTION  2000    HX COLONOSCOPY      HX HEENT  2016    right eye surgery- repaired blood vessel    HX HEENT  2017    left eye surgery- removal of scar tissue    HX HEENT  2018    left eye-laser    HX HEENT  10/23/2018    left eye injection    HX HEENT  2018    left eye intravitreal injection    HX HEENT  2019    left eye intravitreal injection    HX HYSTERECTOMY      partial       Family History   Problem Relation Age of Onset    Hypertension Mother     Diabetes Father     No Known Problems Brother     Suicide Brother     Asthma Daughter     Asthma Son     Psoriasis Son        Social History     Tobacco Use    Smoking status: Never Smoker    Smokeless tobacco: Never Used   Substance Use Topics    Alcohol use: No     Alcohol/week: 0.0 standard drinks        Lab Results   Component Value Date/Time    WBC 6.4 07/02/2021 11:31 AM    HGB 12.0 07/02/2021 11:31 AM    HCT 40.2 07/02/2021 11:31 AM    PLATELET 197 53/01/9102 11:31 AM    MCV 83.9 07/02/2021 11:31 AM     Lab Results   Component Value Date/Time    Cholesterol, total 244 (H) 07/02/2021 11:31 AM    HDL Cholesterol 61 07/02/2021 11:31 AM    LDL, calculated 153 (H) 07/02/2021 11:31 AM    Triglyceride 150 (H) 07/02/2021 11:31 AM    CHOL/HDL Ratio 4.0 07/02/2021 11:31 AM     Lab Results   Component Value Date/Time    TSH 0.942 05/16/2017 03:47 PM      Lab Results   Component Value Date/Time    Sodium 143 07/02/2021 11:31 AM    Potassium 4.0 07/02/2021 11:31 AM    Chloride 111 (H) 07/02/2021 11:31 AM    CO2 28 07/02/2021 11:31 AM    Anion gap 4 (L) 07/02/2021 11:31 AM    Glucose 93 07/02/2021 11:31 AM    BUN 11 07/02/2021 11:31 AM    Creatinine 0.76 07/02/2021 11:31 AM    BUN/Creatinine ratio 14 07/02/2021 11:31 AM    GFR est AA >60 07/02/2021 11:31 AM    GFR est non-AA >60 07/02/2021 11:31 AM    Calcium 9.0 07/02/2021 11:31 AM    Bilirubin, total 0.2 07/02/2021 11:31 AM    ALT (SGPT) 26 07/02/2021 11:31 AM    Alk. phosphatase 130 (H) 07/02/2021 11:31 AM    Protein, total 6.4 07/02/2021 11:31 AM    Albumin 3.4 (L) 07/02/2021 11:31 AM    Globulin 3.0 07/02/2021 11:31 AM    A-G Ratio 1.1 07/02/2021 11:31 AM      Lab Results   Component Value Date/Time    Hemoglobin A1c 11.5 (H) 02/25/2010 08:25 AM    Hemoglobin A1c (POC) 12.7 07/02/2021 11:40 AM         Review of Systems   Constitutional: Negative for malaise/fatigue. HENT: Negative for congestion. Eyes: Negative for blurred vision. Respiratory: Negative for cough and shortness of breath. Cardiovascular: Negative for chest pain, palpitations and leg swelling. Gastrointestinal: Negative for abdominal pain, constipation and heartburn. Genitourinary: Negative for dysuria, frequency and urgency. Musculoskeletal: Negative for back pain and joint pain.    Neurological: Negative for dizziness, tingling and headaches. Endo/Heme/Allergies: Negative for environmental allergies. Psychiatric/Behavioral: Negative for depression. The patient does not have insomnia. Physical Exam  Vitals and nursing note reviewed. Constitutional:       Appearance: Normal appearance. She is well-developed. Comments: /72 (BP 1 Location: Left arm, BP Patient Position: Sitting)   Pulse 85   Temp 98.7 °F (37.1 °C) (Oral)   Resp 16   Ht 5' 2\" (1.575 m)   Wt 202 lb 12.8 oz (92 kg)   LMP  (LMP Unknown)   SpO2 97%   BMI 37.09 kg/m²      HENT:      Right Ear: Tympanic membrane and ear canal normal.      Left Ear: Tympanic membrane and ear canal normal.      Nose: No mucosal edema. Neck:      Thyroid: No thyromegaly. Cardiovascular:      Rate and Rhythm: Normal rate and regular rhythm. Heart sounds: Normal heart sounds. Pulmonary:      Effort: Pulmonary effort is normal.      Breath sounds: Normal breath sounds. Abdominal:      General: Bowel sounds are normal.      Palpations: Abdomen is soft. There is no mass. Tenderness: There is no abdominal tenderness. Musculoskeletal:         General: Normal range of motion. Cervical back: Normal range of motion and neck supple. Right lower leg: No edema. Left lower leg: No edema. Lymphadenopathy:      Cervical: No cervical adenopathy. Skin:     General: Skin is warm and dry. Neurological:      General: No focal deficit present. Mental Status: She is alert and oriented to person, place, and time. Psychiatric:         Mood and Affect: Mood normal.         ASSESSMENT and PLAN  Diagnoses and all orders for this visit:    1. Essential hypertension  BP improved with Norvasc. Discussed sodium restriction, high k rich diet, maintaining ideal body weight and regular exercise program such as daily walking 30 min perday 4-5 times per week, as physiologic means to achieve blood pressure control. Medication compliance advised.      2. Type 1 diabetes mellitus with retinopathy of both eyes, macular edema presence unspecified, unspecified retinopathy severity (HCC)  -     insulin lispro (HUMALOG) 100 unit/mL kwikpen; 5 to 15 units depending on carbs per meal daily.  -     Reduce insulin degludec Zack Edilma FlexTouch U-200) 200 unit/mL (3 mL) inpn pen; 120 Units by SubCUTAneous route daily d/t low BS readings. Follow up with Lisette in 1 week if BS continue to be low. -     REFERRAL TO PODIATRY    3. Mixed hyperlipidemia  Continue to monitor. Work on diet and exercise. 4. Encounter for long-term (current) use of insulin ([facility]  5. Encounter for medication monitoring  a1c due in October. Follow-up and Dispositions    · Return in about 2 months (around 10/18/2021). reviewed diet, exercise and weight control  cardiovascular risk and specific lipid/LDL goals reviewed  reviewed medications and side effects in detail  specific diabetic recommendations: low cholesterol diet, weight control and daily exercise discussed, home glucose monitoring emphasized, all medications, side effects and compliance discussed carefully, foot care discussed and Podiatry visits discussed, annual eye examinations at Ophthalmology discussed and glycohemoglobin and other lab monitoring discussed     I have discussed diagnosis listed in this note with pt and/or family. I have discussed treatment plans and options and the risk/benefit analysis of those options, including safe use of medications and possible medication side effects. Through the use of shared decision making we have agreed to the above plan. The patient has received an after-visit summary and questions were answered concerning future plans and follow up. Advise pt of any urgent changes then to proceed to the ER.

## 2021-08-23 ENCOUNTER — DOCUMENTATION ONLY (OUTPATIENT)
Dept: FAMILY MEDICINE CLINIC | Age: 56
End: 2021-08-23

## 2021-08-23 NOTE — PROGRESS NOTES
Spoke with Quinten Rios from [employer] who is working with patient on seeing if she wants to try the insulin pump. Pt was having low sugars on current regimen. Reviewed sugars with patient / Haroon Ansari and agreed to decrease Tresiba dose to 100 units daily. Will folllow up in 2 weeks, office visit. Pt to call if continues to have lows. Patient verbalized understanding of information presented. Answered all of the patient's questions.     Aurea Alvarado

## 2021-08-31 DIAGNOSIS — E10.319 TYPE 1 DIABETES MELLITUS WITH RETINOPATHY OF BOTH EYES, MACULAR EDEMA PRESENCE UNSPECIFIED, UNSPECIFIED RETINOPATHY SEVERITY (HCC): ICD-10-CM

## 2021-08-31 RX ORDER — INSULIN DEGLUDEC INJECTION 200 U/ML
100 INJECTION, SOLUTION SUBCUTANEOUS DAILY
Qty: 18 ML | Refills: 1 | Status: SHIPPED | OUTPATIENT
Start: 2021-08-23 | End: 2021-10-25 | Stop reason: ALTCHOICE

## 2021-08-31 NOTE — PROGRESS NOTES
Documenting dose decrease for tresiba to 100 units daily; having low sugars. Following up on 9/8 in office. Per CPA with Dr. Giuliano Álvarez   Will call before if continued issues with lows. Ivana Zambrano, PHARMD, [facility] [address] in place:  Yes   Recommendation Provided To: Patient/Caregiver: 1 via Telephone   Intervention Detail: Dose Adjustment: 1, reason: Therapy De-escalation   Gap Closed?: No   Intervention Accepted By: Patient/Caregiver: 1   Time Spent (min): 10

## 2021-09-14 RX ORDER — INSULIN LISPRO 100 [IU]/ML
INJECTION, SOLUTION INTRAVENOUS; SUBCUTANEOUS
Qty: 5 EACH | Refills: 11
Start: 2021-09-14 | End: 2021-09-14 | Stop reason: SDUPTHER

## 2021-09-14 RX ORDER — INSULIN LISPRO 100 [IU]/ML
INJECTION, SOLUTION INTRAVENOUS; SUBCUTANEOUS
Qty: 1 EACH | Refills: 0 | Status: SHIPPED | COMMUNITY
Start: 2021-09-14 | End: 2021-10-25

## 2021-09-14 RX ORDER — BLOOD SUGAR DIAGNOSTIC
STRIP MISCELLANEOUS
Qty: 200 STRIP | Refills: 11 | Status: SHIPPED | OUTPATIENT
Start: 2021-09-14 | End: 2021-10-02 | Stop reason: SDUPTHER

## 2021-09-14 RX ORDER — INSULIN LISPRO 100 [IU]/ML
INJECTION, SOLUTION INTRAVENOUS; SUBCUTANEOUS
Qty: 5 EACH | Refills: 11 | Status: SHIPPED | OUTPATIENT
Start: 2021-09-14 | End: 2021-09-14 | Stop reason: SDUPTHER

## 2021-09-14 NOTE — PROGRESS NOTES
Patient seen today in clinic for pump initiation trial.   Re sending order as \"normal\" for humalog vials for pump; was sent as \"no print\"  earlier today. Provided with one insulin vial of humalog sample to get her started on the pump. Samples logged, documented and signed out per policy. Per CPA with Dr. Kirsty Mcneil training led by Sammi Quintanilla with [employer]. Will follow patient closely as she initiates the pump and determine if she would like to continue with it and update insulin in her med list as indicated. Will continue using her Soo CGM (vs. [employer] system) for now. Has PCP visit in October. Will schedule a follow up with me in the next 2 weeks to review CGM data. Patient verbalized understanding of information presented. Answered all of the patient's questions. Anup Blanchard, PHARMD, [facility] [address] in place:  Yes   Recommendation Provided To: Patient/Caregiver: 3 via In person   Intervention Detail: Device Training, New Rx: 1, reason: Needs Additional Therapy and Patient Access Assistance/Sample Provided   Gap Closed?: No   Intervention Accepted By: Patient/Caregiver: 3   Time Spent (min): 120

## 2021-09-14 NOTE — PROGRESS NOTES
Entering orders for humalog vials for insulin pump and accucheck guide strips for pump, [employer]. Training today to trial insulin pump. Per diabetes CPA with Dr. Luis Delatorre  When confirm if pt will stay on pump or not will need to update med list with Humalog pens vs. Vials and the correct test strips she will be using. Left both on her list for now.    Shantell Bello

## 2021-10-02 RX ORDER — BLOOD SUGAR DIAGNOSTIC
STRIP MISCELLANEOUS
Qty: 200 STRIP | Refills: 11 | Status: SHIPPED | OUTPATIENT
Start: 2021-10-02 | End: 2021-12-29 | Stop reason: ALTCHOICE

## 2021-10-02 NOTE — PROGRESS NOTES
accucheck guide strip order resent to pharmacy - needed for CGM link up with [employer] insulin pump - pt has been using freestyle mario but transitioning to [employer]'s CGM for closed loop system  Per diabetes CPA with Dr. Nicholas Kim, PHARMD, [address] [place] [address],[address]:  Yes   Recommendation Provided To: Patient/Caregiver: 1 via Telephone   Intervention Detail: New Rx: 1, reason: Needs Additional Therapy   Gap Closed?: No   Intervention Accepted By: Patient/Caregiver: 1   Time Spent (min): 10

## 2021-10-25 ENCOUNTER — OFFICE VISIT (OUTPATIENT)
Dept: FAMILY MEDICINE CLINIC | Age: 56
End: 2021-10-25
Payer: COMMERCIAL

## 2021-10-25 ENCOUNTER — OFFICE VISIT (OUTPATIENT)
Dept: FAMILY MEDICINE CLINIC | Age: 56
End: 2021-10-25

## 2021-10-25 VITALS
BODY MASS INDEX: 37.91 KG/M2 | OXYGEN SATURATION: 98 % | SYSTOLIC BLOOD PRESSURE: 172 MMHG | WEIGHT: 206 LBS | HEART RATE: 75 BPM | DIASTOLIC BLOOD PRESSURE: 80 MMHG | HEIGHT: 62 IN | RESPIRATION RATE: 16 BRPM | TEMPERATURE: 98.4 F

## 2021-10-25 DIAGNOSIS — E10.319 TYPE 1 DIABETES MELLITUS WITH RETINOPATHY OF RIGHT EYE WITHOUT MACULAR EDEMA, UNSPECIFIED RETINOPATHY SEVERITY (HCC): Primary | ICD-10-CM

## 2021-10-25 DIAGNOSIS — Z51.81 ENCOUNTER FOR MEDICATION MONITORING: ICD-10-CM

## 2021-10-25 DIAGNOSIS — E78.2 MIXED HYPERLIPIDEMIA: ICD-10-CM

## 2021-10-25 DIAGNOSIS — I10 ESSENTIAL HYPERTENSION: ICD-10-CM

## 2021-10-25 DIAGNOSIS — Z79.4 ENCOUNTER FOR LONG-TERM (CURRENT) USE OF INSULIN (HCC): ICD-10-CM

## 2021-10-25 DIAGNOSIS — E10.319 TYPE 1 DIABETES MELLITUS WITH RETINOPATHY OF BOTH EYES, MACULAR EDEMA PRESENCE UNSPECIFIED, UNSPECIFIED RETINOPATHY SEVERITY (HCC): Primary | ICD-10-CM

## 2021-10-25 DIAGNOSIS — E66.9 NON MORBID OBESITY: ICD-10-CM

## 2021-10-25 DIAGNOSIS — Z23 NEEDS FLU SHOT: ICD-10-CM

## 2021-10-25 LAB
ALBUMIN SERPL-MCNC: 3.6 G/DL (ref 3.5–5)
ALBUMIN/GLOB SERPL: 1.1 {RATIO} (ref 1.1–2.2)
ALP SERPL-CCNC: 116 U/L (ref 45–117)
ALT SERPL-CCNC: 34 U/L (ref 12–78)
ANION GAP SERPL CALC-SCNC: 4 MMOL/L (ref 5–15)
AST SERPL-CCNC: 19 U/L (ref 15–37)
BILIRUB SERPL-MCNC: 0.3 MG/DL (ref 0.2–1)
BUN SERPL-MCNC: 12 MG/DL (ref 6–20)
BUN/CREAT SERPL: 12 (ref 12–20)
CALCIUM SERPL-MCNC: 9.1 MG/DL (ref 8.5–10.1)
CHLORIDE SERPL-SCNC: 109 MMOL/L (ref 97–108)
CHOLEST SERPL-MCNC: 224 MG/DL
CO2 SERPL-SCNC: 27 MMOL/L (ref 21–32)
CREAT SERPL-MCNC: 0.97 MG/DL (ref 0.55–1.02)
GLOBULIN SER CALC-MCNC: 3.3 G/DL (ref 2–4)
GLUCOSE POC: 104 MG/DL
GLUCOSE SERPL-MCNC: 105 MG/DL (ref 65–100)
HBA1C MFR BLD HPLC: 7.9 %
HDLC SERPL-MCNC: 71 MG/DL
HDLC SERPL: 3.2 {RATIO} (ref 0–5)
LDLC SERPL CALC-MCNC: 138.4 MG/DL (ref 0–100)
POTASSIUM SERPL-SCNC: 3.8 MMOL/L (ref 3.5–5.1)
PROT SERPL-MCNC: 6.9 G/DL (ref 6.4–8.2)
SODIUM SERPL-SCNC: 140 MMOL/L (ref 136–145)
TRIGL SERPL-MCNC: 73 MG/DL (ref ?–150)
VLDLC SERPL CALC-MCNC: 14.6 MG/DL

## 2021-10-25 PROCEDURE — 83036 HEMOGLOBIN GLYCOSYLATED A1C: CPT | Performed by: FAMILY MEDICINE

## 2021-10-25 PROCEDURE — 3051F HG A1C>EQUAL 7.0%<8.0%: CPT | Performed by: FAMILY MEDICINE

## 2021-10-25 PROCEDURE — 90471 IMMUNIZATION ADMIN: CPT | Performed by: FAMILY MEDICINE

## 2021-10-25 PROCEDURE — 99214 OFFICE O/P EST MOD 30 MIN: CPT | Performed by: FAMILY MEDICINE

## 2021-10-25 PROCEDURE — 82947 ASSAY GLUCOSE BLOOD QUANT: CPT | Performed by: FAMILY MEDICINE

## 2021-10-25 PROCEDURE — 90686 IIV4 VACC NO PRSV 0.5 ML IM: CPT | Performed by: FAMILY MEDICINE

## 2021-10-25 RX ORDER — INSULIN LISPRO 100 [IU]/ML
INJECTION, SOLUTION INTRAVENOUS; SUBCUTANEOUS
Qty: 1 EACH | Refills: 0
Start: 2021-10-25

## 2021-10-25 RX ORDER — AMLODIPINE BESYLATE 5 MG/1
10 TABLET ORAL DAILY
Qty: 180 TABLET | Refills: 3 | Status: SHIPPED | OUTPATIENT
Start: 2021-10-25 | End: 2022-09-01

## 2021-10-25 NOTE — PROGRESS NOTES
Chief Complaint   Patient presents with    Diabetes     follow up             Microalbumin 7/2/2021    Mammogram 4/2/2021    Colonoscopy 8/21/2019 by Dr. Kim Elizondo- repeat in 10 years. Eye exam 4/16/2021 by Dr. Aby Castanon. Verbal order received per Dr. Milton- flu vaccine IM- VORB    Pt received flu vaccine IM in left deltoid without any difficulty. 1. Have you been to the ER, urgent care clinic since your last visit? Hospitalized since your last visit? no    2. Have you seen or consulted any other health care providers outside of the [address] [place] [address] since your last visit? Include any pap smears or colon screening.   no

## 2021-10-25 NOTE — PROGRESS NOTES
HISTORY OF PRESENT ILLNESS  Phuc Martino is a 54 y.o. female. HPI   1 month follow up on BP and BS. Overall has been feeling well. Cardiovascular Review:  The patient has hypertension and hyperlipidemia. Diet and Lifestyle: generally follows a low fat low cholesterol diet, generally follows a low sodium diet, exercises sporadically, nonsmoker  Home BP Monitoring: is not measured at home. Pertinent ROS: taking medications as instructed, no medication side effects noted, no TIA's, no chest pain on exertion, no dyspnea on exertion, no swelling of ankles, no palpitations, no muscle aches or pain. Diabetes Mellitus:  She has diabetes mellitus type 1 dx at the age of 25. She has been on insulin since her diagnoses. She has retinopathy. She is not being adjusted on the insulin pump and tolerating well. Overall BS are better and more stable and she is feeling better. Diabetic ROS - medication compliance: compliant most of the time, diabetic diet compliance: compliant most of the time, home glucose monitoring: is performed regularly with the [facility] system. She has  been exercising some with walking 2 to 3 times a week. Further diabetic ROS: no chest pain, dyspnea or TIA's, no numbness, has some  tingling in the extremities which has been stable. Vision has been stable. Last eye exam was this past January. Has less nocturia about 1 time. Patient Active Problem List   Diagnosis Code    Gastroesophageal reflux disease without esophagitis K21.9    History of hysterectomy for benign disease Z90.710    Diabetic retinopathy ([address] E11.319    Type 1 diabetes mellitus with retinopathy of right eye without macular edema ([address] E10.319    Encounter for long-term (current) use of insulin ([address] Z79.4    Encounter for medication monitoring Z51.81    Essential hypertension I10    Mixed hyperlipidemia E78.2    Chronic constipation K59.09    Severe obesity (BMI 35.0-39. 9) with comorbidity ([address] E66.01    Uncontrolled type 1 diabetes mellitus with retinopathy of both eyes ([facility] E10.319, E10.65    Type 2 diabetes with nephropathy ([facility] E11.21       Current Outpatient Medications   Medication Sig Dispense Refill    insulin degludec Rotonda Alejandro FlexTouch U-200) 200 unit/mL (3 mL) inpn pen 100 Units by SubCUTAneous route daily. 18 mL 1    glucose blood VI test strips (Accu-Chek Guide test strips) strip Use to check blood sugar four times daily as directed. These strips needed for CGM linked to insulin pump. 200 Strip 11    amLODIPine (NORVASC) 5 mg tablet TAKE 1 TABLET BY MOUTH DAILY 90 Tablet 3    insulin lispro (HUMALOG) 100 unit/mL injection Use with insulin pump for total daily dose of 150 units as directed. Dispense vials please. 1 Each 0    insulin lispro (HUMALOG) 100 unit/mL kwikpen 5 to 15 units depending on carbs per meal daily. 30 mL 3    ibuprofen (MOTRIN) 800 mg tablet TAKE 1 TABLET BY MOUTH TWICE DAILY 60 Tablet 1    FreeStyle Soo 14 Day [place] misc USE EVERY 14 DAYS TO TEST BLOOD SUGAR 2 Each 5    glucose blood VI test strips (FreeStyle Precision [employer] Strips) strip Use to check blood sugar with fingerstick when Bacilio Dewey advises you to do so. 50 Strip 2    flash glucose sensor (FreeStyle Soo 2 Sensor) kit USE AS DIRECTED TO MONITOR BLOOD SUGAR MULTIPLE TIMES DAILY - CGM 2 Kit 11    fexofenadine (ALLEGRA) 180 mg tablet TAKE 1 TABLET BY MOUTH DAILY AS NEEDED FOR ALLERGIES OR RHINITIS 30 Tablet 6    pantoprazole (PROTONIX) 40 mg tablet TAKE 1 TABLET BY MOUTH ONCE DAILY 30 Tablet 11    lisinopriL (PRINIVIL, ZESTRIL) 40 mg tablet Take 1 Tablet by mouth daily. 90 Tablet 3    Ventolin HFA 90 mcg/actuation inhaler INHALE 2 PUFFS BY MOUTH EVERY 4 HOURS AS NEEDED FOR WHEEZING OR SHORTNESS OF BREATH 1 Inhaler 11    loratadine (Claritin) 10 mg tablet Take 10 mg by mouth daily as needed for Allergies.       cyanocobalamin (VITAMIN B12) 1,000 mcg/mL injection ADMINISTER 1 ML IN THE MUSCLE EVERY 30 DAYS 10 mL 1    atorvastatin (LIPITOR) 80 mg tablet TAKE 1 TABLET BY MOUTH DAILY 30 Tab 11    topiramate (TOPAMAX) 50 mg tablet TAKE 1 TABLET BY MOUTH TWICE DAILY 60 Tab 5    fluticasone propionate (FLONASE) 50 mcg/actuation nasal spray INSTILL 2 SPRAYS IN EACH NOSTRIL ONCE DAILY AS NEEDED FOR NASAL CONGESTION 1 Bottle 6    ezetimibe (ZETIA) 10 mg tablet TAKE 1 TABLET BY MOUTH DAILY 30 Tab 11    Insulin Needles, Disposable, (PER PEN NEEDLE) 32 gauge x \" ndle Use to inject insulin 4 times daily as directed. 100 Pen Needle 11    linaclotide (LINZESS) 290 mcg cap capsule Take 290 mcg by mouth Daily (before breakfast). Allergies   Allergen Reactions    Lasix [Furosemide] Hives    Percocet [Oxycodone-Acetaminophen] Other (comments)     AMS         Past Medical History:   Diagnosis Date    Diabetes ([address]     Diabetic retinopathy ([facility]     right eye     Gestational diabetes     Hypercholesterolemia     Hypertension     Type 1 diabetes ([facility]          Past Surgical History:   Procedure Laterality Date    HX BREAST REDUCTION Bilateral     HX CATARACT REMOVAL  2018    left eye    HX  SECTION  1990    HX  SECTION  2000    HX COLONOSCOPY      HX HEENT  2016    right eye surgery- repaired blood vessel    HX HEENT  2017    left eye surgery- removal of scar tissue    HX HEENT  2018    left eye-laser    HX HEENT  10/23/2018    left eye injection    HX HEENT  2018    left eye intravitreal injection    HX HEENT  2019    left eye intravitreal injection    HX HYSTERECTOMY      partial         Family History   Problem Relation Age of Onset    Hypertension Mother     Diabetes Father     No Known Problems Brother     Suicide Brother     Asthma Daughter     Asthma Son     Psoriasis Son        Social History     Tobacco Use    Smoking status: Never Smoker    Smokeless tobacco: Never Used   Substance Use Topics    Alcohol use:  No Alcohol/week: 0.0 standard drinks        Lab Results   Component Value Date/Time    WBC 6.4 07/02/2021 11:31 AM    HGB 12.0 07/02/2021 11:31 AM    HCT 40.2 07/02/2021 11:31 AM    PLATELET 606 78/16/1194 11:31 AM    MCV 83.9 07/02/2021 11:31 AM     Lab Results   Component Value Date/Time    Cholesterol, total 244 (H) 07/02/2021 11:31 AM    HDL Cholesterol 61 07/02/2021 11:31 AM    LDL, calculated 153 (H) 07/02/2021 11:31 AM    Triglyceride 150 (H) 07/02/2021 11:31 AM    CHOL/HDL Ratio 4.0 07/02/2021 11:31 AM     Lab Results   Component Value Date/Time    TSH 0.942 05/16/2017 03:47 PM      Lab Results   Component Value Date/Time    Sodium 143 07/02/2021 11:31 AM    Potassium 4.0 07/02/2021 11:31 AM    Chloride 111 (H) 07/02/2021 11:31 AM    CO2 28 07/02/2021 11:31 AM    Anion gap 4 (L) 07/02/2021 11:31 AM    Glucose 93 07/02/2021 11:31 AM    BUN 11 07/02/2021 11:31 AM    Creatinine 0.76 07/02/2021 11:31 AM    BUN/Creatinine ratio 14 07/02/2021 11:31 AM    GFR est AA >60 07/02/2021 11:31 AM    GFR est non-AA >60 07/02/2021 11:31 AM    Calcium 9.0 07/02/2021 11:31 AM    Bilirubin, total 0.2 07/02/2021 11:31 AM    ALT (SGPT) 26 07/02/2021 11:31 AM    Alk. phosphatase 130 (H) 07/02/2021 11:31 AM    Protein, total 6.4 07/02/2021 11:31 AM    Albumin 3.4 (L) 07/02/2021 11:31 AM    Globulin 3.0 07/02/2021 11:31 AM    A-G Ratio 1.1 07/02/2021 11:31 AM      Lab Results   Component Value Date/Time    Hemoglobin A1c 11.5 (H) 02/25/2010 08:25 AM    Hemoglobin A1c (POC) 12.7 07/02/2021 11:40 AM         Review of Systems   Constitutional: Negative for malaise/fatigue. HENT: Negative for congestion. Eyes: Negative for blurred vision. Respiratory: Negative for cough and shortness of breath. Cardiovascular: Negative for chest pain, palpitations and leg swelling. Gastrointestinal: Negative for abdominal pain, constipation and heartburn. Genitourinary: Negative for dysuria, frequency and urgency.    Musculoskeletal: Negative for back pain and joint pain. Neurological: Negative for dizziness, tingling and headaches. Endo/Heme/Allergies: Negative for environmental allergies. Psychiatric/Behavioral: Negative for depression. The patient does not have insomnia. Physical Exam  Vitals and nursing note reviewed. Constitutional:       Appearance: Normal appearance. She is well-developed. Comments: BP (!) 172/80   Pulse 75   Temp 98.4 °F (36.9 °C) (Oral)   Resp 16   Ht 5' 2\" (1.575 m)   Wt 206 lb (93.4 kg)   LMP  (LMP Unknown)   SpO2 98%   BMI 37.68 kg/m²      HENT:      Right Ear: Tympanic membrane and ear canal normal.      Left Ear: Tympanic membrane and ear canal normal.      Nose: No mucosal edema. Neck:      Thyroid: No thyromegaly. Cardiovascular:      Rate and Rhythm: Normal rate and regular rhythm. Heart sounds: Normal heart sounds. Pulmonary:      Effort: Pulmonary effort is normal.      Breath sounds: Normal breath sounds. Abdominal:      General: Bowel sounds are normal.      Palpations: Abdomen is soft. There is no mass. Tenderness: There is no abdominal tenderness. Musculoskeletal:         General: Normal range of motion. Cervical back: Normal range of motion and neck supple. Right lower leg: No edema. Left lower leg: No edema. Lymphadenopathy:      Cervical: No cervical adenopathy. Skin:     General: Skin is warm and dry. Neurological:      General: No focal deficit present. Mental Status: She is alert and oriented to person, place, and time. Psychiatric:         Mood and Affect: Mood normal.         ASSESSMENT and PLAN  Diagnoses and all orders for this visit:    1. Type 1 diabetes mellitus with retinopathy of both eyes, macular edema presence unspecified, unspecified retinopathy severity ([address]  a1c improved from 12.7 to 7.9%!  -     AMB POC HEMOGLOBIN A1C  -     AMB POC GLUCOSE, QUANTITATIVE, BLOOD    2.  Essential hypertension  -     Increase amLODIPine (NORVASC) 5 mg tablet; Take 2 Tablets by mouth daily. Discussed sodium restriction, high k rich diet, maintaining ideal body weight and regular exercise program such as daily walking 30 min perday 4-5 times per week, as physiologic means to achieve blood pressure control. Medication compliance advised. 3. Mixed hyperlipidemia  Continue to monitor. Work on diet and exercise.  -     LIPID PANEL; Future    4. Encounter for long-term (current) use of insulin (HCC)//  5. Encounter for medication monitoring  -     METABOLIC PANEL, COMPREHENSIVE; Future    6. Non morbid obesity  I have reviewed/discussed the above normal BMI with the patient. I have recommended the following interventions: dietary management education, guidance, and counseling . 7. Needs flu shot  -     INFLUENZA VIRUS VAC QUAD,SPLIT,PRESV FREE SYRINGE IM      Follow-up and Dispositions    · Return in about 2 months (around 12/25/2021). reviewed diet, exercise and weight control  cardiovascular risk and specific lipid/LDL goals reviewed  reviewed medications and side effects in detail  specific diabetic recommendations: low cholesterol diet, weight control and daily exercise discussed, home glucose monitoring emphasized, foot care discussed and Podiatry visits discussed, annual eye examinations at Ophthalmology discussed and glycohemoglobin and other lab monitoring discussed     I have discussed diagnosis listed in this note with pt and/or family. I have discussed treatment plans and options and the risk/benefit analysis of those options, including safe use of medications and possible medication side effects. Through the use of shared decision making we have agreed to the above plan. The patient has received an after-visit summary and questions were answered concerning future plans and follow up. Advise pt of any urgent changes then to proceed to the ER.

## 2021-10-25 NOTE — PROGRESS NOTES
Agree with documentation noted by Lenin Valdes PharmD    Pt seen along with [employer]Z before PCP appt to try to use guardian CGM sensor that links up with insulin pump. A1c has improved to 7.9%! Not having as many low sugars. Some elevated sugars after dinner so will increase bolus with dinner to 12 units and continue with other settings, current regimen. (see mario reports below)    Sent order to pharmacy for fast click drum to use with accucheck guide meter / lancet device that calibrates with the guardian sensor. Will follow up with [employer] this week to see how the sensor is doing as had some issues with it today. May need further troubleshooting. Follow up with me as desired. PCP in 2 months. Patient verbalized understanding of information presented. Answered all of the patient's questions.   AVS handed and reviewed with patient    Per diabetes CPA with Dr. Oliver Cote

## 2021-10-25 NOTE — PROGRESS NOTES
Pharmacist Visit for Diabetes Management & Education    Interval History: Bridgett Jimenez is a 54 y.o. female referred by Dr. Nohemi Lnadin MD for diabetes management follow-up before PCP appointment. This visit was conducted concurrently with Cresencio Rdz from [facility], who is working with patient to set up Medtronic sensor with patient's insulin pump to measure blood glucose levels. At last PharmD visit on 07/09/21, patient moved forward with starting insulin pump. Subjective:  Patient reports that insulin pump has been working great. Also mentions that she did not charge transmitter before coming to appointment. Current Diabetes Regimen:  Since patient is on insulin pump, only using Humalog 150 units as directed. Basal rate is 0.42 units/day, whereas bolus rate is 0.58 units/day    ROS:   Patient denies hypoglycemic and hyperglycemic signs/symptoms, chest pain, shortness of breath, neuropathy, vision changes, and any foot changes.     Self-Monitoring Blood Glucose:  Patient was wearing FreeStyle Soo sensor for past 2 weeks, but didn't link up with insulin pump  69% of blood glucose levels are within target range with 28% in high range, 10% in very high range, 1% in low range and no levels in very low range  GMI estimated to be 7.3%    Data reviewed:  Lab Results   Component Value Date/Time    Hemoglobin A1c 11.5 (H) 02/25/2010 08:25 AM    Hemoglobin A1c 11.0 (H) 05/06/2009 03:40 PM    Hemoglobin A1c (POC) 12.7 07/02/2021 11:40 AM    Hemoglobin A1c (POC) 10.0 09/20/2019 03:12 PM    Hemoglobin A1c (POC) 9.4 03/29/2019 09:38 AM    Glucose 93 07/02/2021 11:31 AM    Glucose POC 86 07/02/2021 11:40 AM    Microalbumin/Creat ratio (mg/g creat) 28 07/02/2021 11:31 AM    Microalbumin,urine random 6.20 07/02/2021 11:31 AM    LDL, calculated 153 (H) 07/02/2021 11:31 AM    Creatinine 0.76 07/02/2021 11:31 AM     Immunizations:  Immunization History   Administered Date(s) Administered    COVID-19, PFIZER, MRNA, LNP-S, PF, 30MCG/0.3ML DOSE 04/24/2021, 05/15/2021    Influenza Vaccine 10/01/2017, 10/03/2018    Influenza Vaccine (Quad) PF (>6 Mo Flulaval, Fluarix, and >3 Yrs Afluria, Fluzone [id number]) 10/03/2018, 09/20/2019, 10/05/2020    Pneumococcal Polysaccharide (PPSV-23) 05/16/2017    Td, Adsorbed PF 05/04/2013    Zoster Recombinant 01/29/2020, 08/18/2021     Statin - ACEI/ARB - ASA  Key CAD CHF Meds             amLODIPine (NORVASC) 5 mg tablet (Taking) TAKE 1 TABLET BY MOUTH DAILY    lisinopriL (PRINIVIL, ZESTRIL) 40 mg tablet Take 1 Tablet by mouth daily. atorvastatin (LIPITOR) 80 mg tablet TAKE 1 TABLET BY MOUTH DAILY    ezetimibe (ZETIA) 10 mg tablet TAKE 1 TABLET BY MOUTH DAILY        Assessment/Plan:     1. Diabetes:      A1c not at goal < 7% per ADA guidelines. However, patient had PCP appointment after PharmD visit, in which A1c was 7.9%. Congratulated patient on amazing progress as patient is much improved and hypoglycemia has improved. Per CGM report, majority of blood glucose levels are within range. However, there is a spike around dinner time. Thus, prandial insulin will be increased to 12 units before dinner and 10 units will remain before breakfast and lunch. Jocelyne Parisi placed Medtronic sensor on patient and set it up with insulin pump for a 2-week trial. Patient to continue doing two fingersticks twice a day to calibrate pump, in which the benefit from this would be to eventually go on auto mode. Of note, sensor was initially placed on patient's abdomen, but was not calibrating. Tried placing it on arm and it calibrated. Additionally, linked up sensor with nico on phone and synced patient to my[employer] to share data with Medtronic. Emphasized the importance of pushing bolus insulin before meals and not after, as well as utilizing carbohydrate counting. Patient to continue doing two fingersticks a twice as well to monitor blood glucose levels and progression of diabetes.     Patient verbalized understanding of the information presented and all of the patients questions were answered. AVS was handed to the patient and information reviewed. Patient advised to call me or Dr. Litzy Dominguez MD with any additional questions or concerns. Notification of recommendations will be sent to Dr. Litzy Dominguez MD for review. Awa Olvera PharmD    For Pharmacy Admin Tracking Only     CPA in place:  Yes   Recommendation Provided To: Patient/Caregiver: 2 via In person   Intervention Detail: Device Training and Dose Adjustment: 1, reason: Therapy Optimization   Intervention Accepted By: Patient/Caregiver: 2   Time Spent (min): 90

## 2021-10-26 RX ORDER — EVOLOCUMAB 140 MG/ML
140 INJECTION, SOLUTION SUBCUTANEOUS
Qty: 2 ML | Refills: 11 | Status: SHIPPED | OUTPATIENT
Start: 2021-10-26 | End: 2021-10-27 | Stop reason: CLARIF

## 2021-10-26 RX ORDER — LANCETS
EACH MISCELLANEOUS
Qty: 1 EACH | Refills: 11 | Status: CANCELLED | OUTPATIENT
Start: 2021-10-26

## 2021-10-26 RX ORDER — LANCETS
EACH MISCELLANEOUS
Qty: 1 EACH | Refills: 11 | Status: SHIPPED | OUTPATIENT
Start: 2021-10-26

## 2021-10-26 NOTE — PROGRESS NOTES
Entering orders for Repatha per d/w Dr. Maxine Mukherjee  Will check on coverage and look for PA and complete it as indicated  Pt on and adherent to max dose statin atorvastatin 80 mg daily and ezetimibe combo still with LDL of 138 - primary hyperlipidemia with DM1 and HTN - high risk for CAD    PA completed - PA noted Praluent is preferred - sent order to Pral[employer] and will check on coverage / discuss with patient. Informed pt; Che states needs PA but no PA is in Cover my Meds as of Wed 10/27 at 3 PM - will check back tomorrow and call if do not see anything.

## 2021-10-27 ENCOUNTER — TELEPHONE (OUTPATIENT)
Dept: FAMILY MEDICINE CLINIC | Age: 56
End: 2021-10-27

## 2021-10-27 RX ORDER — ALIROCUMAB 75 MG/ML
75 INJECTION, SOLUTION SUBCUTANEOUS EVERY 2 WEEKS
Qty: 2 ML | Refills: 11 | Status: SHIPPED | OUTPATIENT
Start: 2021-10-27

## 2021-10-27 NOTE — TELEPHONE ENCOUNTER
DAMIEN concerning cholesterol. Asked to call back to follow up with me or [facility] concerning her medications.

## 2021-10-27 NOTE — TELEPHONE ENCOUNTER
----- Message from ANGELY Chase sent at 10/26/2021 10:31 AM EDT -----  Regarding: Lipids  I am going to check with her to make sure she's been able to take the atorva 80 and ezetimibe consistently     ----- Message -----  From: Janina St MD  Sent: 10/25/2021   7:14 PM EDT  To: Robina Mendieta PHARMD    Any suggestions for her lipids?  ----- Message -----  From: Robina Mendieta LPN  Sent: [id number]   1:55 PM EDT  To: Deja Harrison MD

## 2021-10-28 DIAGNOSIS — E78.2 MIXED HYPERLIPIDEMIA: Primary | ICD-10-CM

## 2021-10-28 DIAGNOSIS — E10.319 TYPE 1 DIABETES MELLITUS WITH RETINOPATHY OF BOTH EYES, MACULAR EDEMA PRESENCE UNSPECIFIED, UNSPECIFIED RETINOPATHY SEVERITY (HCC): ICD-10-CM

## 2021-12-02 ENCOUNTER — DOCUMENTATION ONLY (OUTPATIENT)
Dept: FAMILY MEDICINE CLINIC | Age: 56
End: 2021-12-02

## 2021-12-02 NOTE — PROGRESS NOTES
Regarding lipids: Confirmed patient received and started praluent. Regarding diabetes using medtronic insulin pump - update from [employer] CDE  PT is in automode and liking the results. She is 72% in range with less variability. 1% less than 70 and 1% over 250 (compared to 3% and 5% which was improvement to her past uploads.)  See report below. She is comfortable changing her sensor and does not want to move back to [place] at the present time. She will check in again with me  in 2 weeks. Naa Pierce RN, BSN, [facility]  Pronouns She/Her  Senior Clinical  for [facility] - [address] [phone] Fax [phone]        Has follow up with PCP on 12/29 at 9:20 AM - will review reports with Elza and see if Dr. Jose Armando Sweet would like to review prior to appt.     Godfrey Garcia

## 2021-12-23 RX ORDER — IBUPROFEN 800 MG/1
TABLET ORAL
Qty: 60 TABLET | Refills: 1 | Status: SHIPPED | OUTPATIENT
Start: 2021-12-23 | End: 2022-02-22

## 2021-12-29 ENCOUNTER — OFFICE VISIT (OUTPATIENT)
Dept: FAMILY MEDICINE CLINIC | Age: 56
End: 2021-12-29
Payer: COMMERCIAL

## 2021-12-29 VITALS
DIASTOLIC BLOOD PRESSURE: 70 MMHG | HEART RATE: 86 BPM | TEMPERATURE: 98.6 F | HEIGHT: 62 IN | BODY MASS INDEX: 37.65 KG/M2 | OXYGEN SATURATION: 98 % | RESPIRATION RATE: 16 BRPM | WEIGHT: 204.6 LBS | SYSTOLIC BLOOD PRESSURE: 138 MMHG

## 2021-12-29 DIAGNOSIS — I10 ESSENTIAL HYPERTENSION: ICD-10-CM

## 2021-12-29 DIAGNOSIS — E78.00 HYPERCHOLESTEROLEMIA: ICD-10-CM

## 2021-12-29 DIAGNOSIS — E66.9 NON MORBID OBESITY: ICD-10-CM

## 2021-12-29 DIAGNOSIS — E53.8 B12 DEFICIENCY: ICD-10-CM

## 2021-12-29 DIAGNOSIS — Z79.4 ENCOUNTER FOR LONG-TERM (CURRENT) USE OF INSULIN (HCC): ICD-10-CM

## 2021-12-29 DIAGNOSIS — Z51.81 ENCOUNTER FOR MEDICATION MONITORING: ICD-10-CM

## 2021-12-29 DIAGNOSIS — E10.319 TYPE 1 DIABETES MELLITUS WITH RETINOPATHY OF BOTH EYES, MACULAR EDEMA PRESENCE UNSPECIFIED, UNSPECIFIED RETINOPATHY SEVERITY (HCC): Primary | ICD-10-CM

## 2021-12-29 LAB
ANION GAP SERPL CALC-SCNC: 5 MMOL/L (ref 5–15)
BUN SERPL-MCNC: 17 MG/DL (ref 6–20)
BUN/CREAT SERPL: 15 (ref 12–20)
CALCIUM SERPL-MCNC: 9.3 MG/DL (ref 8.5–10.1)
CHLORIDE SERPL-SCNC: 107 MMOL/L (ref 97–108)
CHOLEST SERPL-MCNC: 136 MG/DL
CO2 SERPL-SCNC: 27 MMOL/L (ref 21–32)
CREAT SERPL-MCNC: 1.12 MG/DL (ref 0.55–1.02)
GLUCOSE SERPL-MCNC: 202 MG/DL (ref 65–100)
HDLC SERPL-MCNC: 68 MG/DL
HDLC SERPL: 2 {RATIO} (ref 0–5)
LDLC SERPL CALC-MCNC: 51 MG/DL (ref 0–100)
POTASSIUM SERPL-SCNC: 4.3 MMOL/L (ref 3.5–5.1)
SODIUM SERPL-SCNC: 139 MMOL/L (ref 136–145)
TRIGL SERPL-MCNC: 85 MG/DL (ref ?–150)
VIT B12 SERPL-MCNC: 905 PG/ML (ref 193–986)
VLDLC SERPL CALC-MCNC: 17 MG/DL

## 2021-12-29 PROCEDURE — 3051F HG A1C>EQUAL 7.0%<8.0%: CPT | Performed by: FAMILY MEDICINE

## 2021-12-29 PROCEDURE — 99214 OFFICE O/P EST MOD 30 MIN: CPT | Performed by: FAMILY MEDICINE

## 2021-12-29 RX ORDER — CYANOCOBALAMIN 1000 UG/ML
INJECTION, SOLUTION INTRAMUSCULAR; SUBCUTANEOUS
Qty: 10 ML | Refills: 1
Start: 2021-12-29 | End: 2022-01-07

## 2021-12-29 NOTE — PROGRESS NOTES
Chief Complaint   Patient presents with    Hypertension     follow up    Cholesterol Problem     follow up    Diabetes     follow up             Microalbumin 7/2/2021    Mammogram 4/2/2021    Colonoscopy 8/21/2019 by Dr. Zaid Turner- repeat in 10 years. Eye exam 11/2/2021 by Dr. Khris Tineo. 1. Have you been to the ER, urgent care clinic since your last visit? Hospitalized since your last visit? no    2. Have you seen or consulted any other health care providers outside of the [address] [place] [address] since your last visit? Include any pap smears or colon screening.   no

## 2022-01-07 DIAGNOSIS — E53.8 B12 DEFICIENCY: ICD-10-CM

## 2022-01-07 RX ORDER — CYANOCOBALAMIN 1000 UG/ML
INJECTION, SOLUTION INTRAMUSCULAR; SUBCUTANEOUS
Qty: 14 ML | Refills: 3 | Status: SHIPPED | OUTPATIENT
Start: 2022-01-07

## 2022-01-21 ENCOUNTER — TELEPHONE (OUTPATIENT)
Dept: FAMILY MEDICINE CLINIC | Age: 57
End: 2022-01-21

## 2022-01-21 NOTE — TELEPHONE ENCOUNTER
----- Message from Josh Peraza LPN sent at 0/08/2433  8:22 AM EST -----  Tony Knox    Did I give you the prior auth on this patient for the Guardian Sensor? If not I will put on your ddesk. Thanks for everything you do!

## 2022-02-14 RX ORDER — FLASH GLUCOSE SCANNING READER
EACH MISCELLANEOUS
Qty: 2 EACH | Refills: 0 | Status: SHIPPED | OUTPATIENT
Start: 2022-02-14 | End: 2022-11-02 | Stop reason: ALTCHOICE

## 2022-02-14 RX ORDER — FLASH GLUCOSE SCANNING READER
EACH MISCELLANEOUS
Qty: 2 EACH | Refills: 0 | Status: SHIPPED | OUTPATIENT
Start: 2022-02-14 | End: 2022-08-22 | Stop reason: ALTCHOICE

## 2022-02-19 DIAGNOSIS — J20.9 BRONCHITIS WITH BRONCHOSPASM: ICD-10-CM

## 2022-02-21 RX ORDER — ALBUTEROL SULFATE 90 UG/1
AEROSOL, METERED RESPIRATORY (INHALATION)
Qty: 18 G | Refills: 3 | Status: SHIPPED | OUTPATIENT
Start: 2022-02-21

## 2022-02-22 RX ORDER — IBUPROFEN 800 MG/1
TABLET ORAL
Qty: 60 TABLET | Refills: 1 | Status: SHIPPED | OUTPATIENT
Start: 2022-02-22 | End: 2022-04-20

## 2022-03-02 ENCOUNTER — OFFICE VISIT (OUTPATIENT)
Dept: FAMILY MEDICINE CLINIC | Age: 57
End: 2022-03-02
Payer: COMMERCIAL

## 2022-03-02 VITALS
SYSTOLIC BLOOD PRESSURE: 122 MMHG | TEMPERATURE: 99.2 F | BODY MASS INDEX: 37.69 KG/M2 | OXYGEN SATURATION: 98 % | RESPIRATION RATE: 17 BRPM | WEIGHT: 204.8 LBS | HEART RATE: 86 BPM | DIASTOLIC BLOOD PRESSURE: 66 MMHG | HEIGHT: 62 IN

## 2022-03-02 DIAGNOSIS — Z79.4 ENCOUNTER FOR LONG-TERM (CURRENT) USE OF INSULIN (HCC): ICD-10-CM

## 2022-03-02 DIAGNOSIS — Z51.81 ENCOUNTER FOR MEDICATION MONITORING: ICD-10-CM

## 2022-03-02 DIAGNOSIS — I10 ESSENTIAL HYPERTENSION: ICD-10-CM

## 2022-03-02 DIAGNOSIS — E10.319 TYPE 1 DIABETES MELLITUS WITH RETINOPATHY OF BOTH EYES, MACULAR EDEMA PRESENCE UNSPECIFIED, UNSPECIFIED RETINOPATHY SEVERITY (HCC): Primary | ICD-10-CM

## 2022-03-02 DIAGNOSIS — E78.2 MIXED HYPERLIPIDEMIA: ICD-10-CM

## 2022-03-02 DIAGNOSIS — E66.9 NON MORBID OBESITY: ICD-10-CM

## 2022-03-02 LAB
ALBUMIN SERPL-MCNC: 3.6 G/DL (ref 3.5–5)
ALBUMIN/GLOB SERPL: 1.1 {RATIO} (ref 1.1–2.2)
ALP SERPL-CCNC: 129 U/L (ref 45–117)
ALT SERPL-CCNC: 36 U/L (ref 12–78)
ANION GAP SERPL CALC-SCNC: 2 MMOL/L (ref 5–15)
AST SERPL-CCNC: 16 U/L (ref 15–37)
BILIRUB SERPL-MCNC: 0.3 MG/DL (ref 0.2–1)
BUN SERPL-MCNC: 15 MG/DL (ref 6–20)
BUN/CREAT SERPL: 15 (ref 12–20)
CALCIUM SERPL-MCNC: 9.2 MG/DL (ref 8.5–10.1)
CHLORIDE SERPL-SCNC: 108 MMOL/L (ref 97–108)
CHOLEST SERPL-MCNC: 121 MG/DL
CO2 SERPL-SCNC: 29 MMOL/L (ref 21–32)
CREAT SERPL-MCNC: 0.99 MG/DL (ref 0.55–1.02)
GLOBULIN SER CALC-MCNC: 3.2 G/DL (ref 2–4)
GLUCOSE POC: 246 MG/DL
GLUCOSE SERPL-MCNC: 212 MG/DL (ref 65–100)
HBA1C MFR BLD HPLC: 8.9 %
HDLC SERPL-MCNC: 70 MG/DL
HDLC SERPL: 1.7 {RATIO} (ref 0–5)
LDLC SERPL CALC-MCNC: 35.6 MG/DL (ref 0–100)
POTASSIUM SERPL-SCNC: 4.2 MMOL/L (ref 3.5–5.1)
PROT SERPL-MCNC: 6.8 G/DL (ref 6.4–8.2)
SODIUM SERPL-SCNC: 139 MMOL/L (ref 136–145)
TRIGL SERPL-MCNC: 77 MG/DL (ref ?–150)
VLDLC SERPL CALC-MCNC: 15.4 MG/DL

## 2022-03-02 PROCEDURE — 83036 HEMOGLOBIN GLYCOSYLATED A1C: CPT | Performed by: FAMILY MEDICINE

## 2022-03-02 PROCEDURE — 3052F HG A1C>EQUAL 8.0%<EQUAL 9.0%: CPT | Performed by: FAMILY MEDICINE

## 2022-03-02 PROCEDURE — 82947 ASSAY GLUCOSE BLOOD QUANT: CPT | Performed by: FAMILY MEDICINE

## 2022-03-02 PROCEDURE — 99214 OFFICE O/P EST MOD 30 MIN: CPT | Performed by: FAMILY MEDICINE

## 2022-03-02 NOTE — PROGRESS NOTES
HISTORY OF PRESENT ILLNESS  Santiago Larkin is a 64 y.o. female. HPI   Follow up on chronic medical problems. Overall has been feeling well. Cardiovascular Review:  The patient has hypertension and hyperlipidemia. Diet and Lifestyle: generally follows a low fat low cholesterol diet, generally follows a low sodium diet, exercises sporadically, nonsmoker  Home BP Monitoring: is not measured at home. Pertinent ROS: taking medications as instructed, no medication side effects noted, no TIA's, no chest pain on exertion, no dyspnea on exertion, no swelling of ankles, no palpitations, no muscle aches or pain. Diabetes Mellitus:  She has diabetes mellitus type 1 dx at the age of 25. She has been on insulin since her diagnoses. She has retinopathy. She is adjusting well to her insulin pump. BS have been improved and ranging in the low to mid 100s. Has had regular contact with rep with insulin pump. Diabetic ROS - medication compliance: compliant most of the time, diabetic diet compliance: compliant most of the time, home glucose monitoring: is performed regularly with the [facility]. She has  been exercising some with walking 2 to 3 times a week. Further diabetic ROS: no chest pain, dyspnea or TIA's, no numbness, has some  tingling in the extremities which has been stable. Vision has been stable. Patient Active Problem List   Diagnosis Code    Gastroesophageal reflux disease without esophagitis K21.9    History of hysterectomy for benign disease Z90.710    Diabetic retinopathy ([address] E11.319    Type 1 diabetes mellitus with retinopathy of right eye without macular edema ([address] E10.319    Encounter for long-term (current) use of insulin ([address] Z79.4    Encounter for medication monitoring Z51.81    Essential hypertension I10    Mixed hyperlipidemia E78.2    Chronic constipation K59.09    Severe obesity (BMI 35.0-39. 9) with comorbidity ([address] E66.01    Uncontrolled type 1 diabetes mellitus with retinopathy of both eyes ([facility] E10.319, E10.65    Type 2 diabetes with nephropathy ([facility]) E11.21       Current Outpatient Medications   Medication Sig Dispense Refill    ibuprofen (MOTRIN) 800 mg tablet TAKE 1 TABLET BY MOUTH TWICE DAILY AS NEEDED FOR PAIN 60 Tablet 1    albuterol (PROVENTIL HFA, VENTOLIN HFA, PROAIR HFA) 90 mcg/actuation inhaler INHALE 2 PUFFS BY MOUTH EVERY 4 HOURS AS NEEDED FOR WHEEZING OR SHORTNESS OF BREATH 18 g 3    FreeStyle Soo 14 Day [place] misc USE EVERY 14 DAYS TO TEST BLOOD SUGAR 2 Each 0    FreeStyle Soo 14 Day [place] misc USE EVERY 14 DAYS TO TEST BLOOD SUGAR 2 Each 0    cyanocobalamin (VITAMIN B12) 1,000 mcg/mL injection INJECT 1 ML INTO THE MUSCLE EVERY 30 DAYS 14 mL 3    alirocumab (Praluent Pen) 75 mg/mL injector pen 1 mL by SubCUTAneous route Once every 2 weeks. 2 mL 11    lancets (Accu-Chek Fastclix Baoku) misc Use to check sugar 3 times daily to calibrate with insulin pump / sensor as directed. 1 Each 11    amLODIPine (NORVASC) 5 mg tablet Take 2 Tablets by mouth daily. 180 Tablet 3    insulin lispro (HUMALOG) 100 unit/mL injection Use with insulin pump for total daily dose of 150 units as directed. Use 10 units with breakfast, 10 units with lunch, and 12 units with dinner for bolus doses as directed. Dispense vials please. 1 Each 0    glucose blood VI test strips (FreeStyle Precision [place] Strips) strip Use to check blood sugar with fingerstick when Monica Huerta advises you to do so. 50 Strip 2    flash glucose sensor (FreeStyle Soo 2 Sensor) kit USE AS DIRECTED TO MONITOR BLOOD SUGAR MULTIPLE TIMES DAILY - CGM 2 Kit 11    fexofenadine (ALLEGRA) 180 mg tablet TAKE 1 TABLET BY MOUTH DAILY AS NEEDED FOR ALLERGIES OR RHINITIS 30 Tablet 6    pantoprazole (PROTONIX) 40 mg tablet TAKE 1 TABLET BY MOUTH ONCE DAILY 30 Tablet 11    lisinopriL (PRINIVIL, ZESTRIL) 40 mg tablet Take 1 Tablet by mouth daily.  90 Tablet 3    loratadine (Claritin) 10 mg tablet Take 10 mg by mouth daily as needed for Allergies.  atorvastatin (LIPITOR) 80 mg tablet TAKE 1 TABLET BY MOUTH DAILY 30 Tab 11    topiramate (TOPAMAX) 50 mg tablet TAKE 1 TABLET BY MOUTH TWICE DAILY 60 Tab 5    fluticasone propionate (FLONASE) 50 mcg/actuation nasal spray INSTILL 2 SPRAYS IN EACH NOSTRIL ONCE DAILY AS NEEDED FOR NASAL CONGESTION 1 Bottle 6    ezetimibe (ZETIA) 10 mg tablet TAKE 1 TABLET BY MOUTH DAILY 30 Tab 11    linaclotide (LINZESS) 290 mcg cap capsule Take 290 mcg by mouth Daily (before breakfast).          Allergies   Allergen Reactions    Lasix [Furosemide] Hives    Percocet [Oxycodone-Acetaminophen] Other (comments)     AMS       Past Medical History:   Diagnosis Date    Diabetes ([address]     Diabetic retinopathy ([facility]     right eye     Gestational diabetes     Hypercholesterolemia     Hypertension     Type 1 diabetes ([facility]        Past Surgical History:   Procedure Laterality Date    HX BREAST REDUCTION Bilateral 1984    HX CATARACT REMOVAL  2018    left eye    HX  SECTION  1990    HX  SECTION  2000    HX COLONOSCOPY      HX HEENT  2016    right eye surgery- repaired blood vessel    HX HEENT  2017    left eye surgery- removal of scar tissue    HX HEENT  2018    left eye-laser    HX HEENT  10/23/2018    left eye injection    HX HEENT  2018    left eye intravitreal injection    HX HEENT  2019    left eye intravitreal injection    HX HYSTERECTOMY      partial       Family History   Problem Relation Age of Onset    Hypertension Mother     Diabetes Father     No Known Problems Brother     Suicide Brother     Asthma Daughter     Asthma Son     Psoriasis Son        Social History     Tobacco Use    Smoking status: Never Smoker    Smokeless tobacco: Never Used   Substance Use Topics    Alcohol use: No     Alcohol/week: 0.0 standard drinks        Lab Results   Component Value Date/Time    WBC 6.4 2021 11:31 AM HGB 12.0 07/02/2021 11:31 AM    HCT 40.2 07/02/2021 11:31 AM    PLATELET 728 23/37/4665 11:31 AM    MCV 83.9 07/02/2021 11:31 AM     Lab Results   Component Value Date/Time    Cholesterol, total 136 12/29/2021 10:32 AM    HDL Cholesterol 68 12/29/2021 10:32 AM    LDL, calculated 51 12/29/2021 10:32 AM    Triglyceride 85 12/29/2021 10:32 AM    CHOL/HDL Ratio 2.0 12/29/2021 10:32 AM     Lab Results   Component Value Date/Time    TSH 0.942 05/16/2017 03:47 PM      Lab Results   Component Value Date/Time    Sodium 139 12/29/2021 10:32 AM    Potassium 4.3 12/29/2021 10:32 AM    Chloride 107 12/29/2021 10:32 AM    CO2 27 12/29/2021 10:32 AM    Anion gap 5 12/29/2021 10:32 AM    Glucose 202 (H) 12/29/2021 10:32 AM    BUN 17 12/29/2021 10:32 AM    Creatinine 1.12 (H) 12/29/2021 10:32 AM    BUN/Creatinine ratio 15 12/29/2021 10:32 AM    GFR est AA >60 12/29/2021 10:32 AM    GFR est non-AA 50 (L) 12/29/2021 10:32 AM    Calcium 9.3 12/29/2021 10:32 AM    Bilirubin, total 0.3 10/25/2021 11:50 AM    ALT (SGPT) 34 10/25/2021 11:50 AM    Alk. phosphatase 116 10/25/2021 11:50 AM    Protein, total 6.9 10/25/2021 11:50 AM    Albumin 3.6 10/25/2021 11:50 AM    Globulin 3.3 10/25/2021 11:50 AM    A-G Ratio 1.1 10/25/2021 11:50 AM      Lab Results   Component Value Date/Time    Hemoglobin A1c 11.5 (H) 02/25/2010 08:25 AM    Hemoglobin A1c (POC) 7.9 10/25/2021 12:55 PM         Review of Systems   Constitutional: Negative for malaise/fatigue. HENT: Negative for congestion. Eyes: Negative for blurred vision. Respiratory: Negative for cough and shortness of breath. Cardiovascular: Negative for chest pain, palpitations and leg swelling. Gastrointestinal: Negative for abdominal pain, constipation and heartburn. Genitourinary: Negative for dysuria, frequency and urgency. Musculoskeletal: Negative for back pain and joint pain. Neurological: Negative for dizziness, tingling and headaches.    Endo/Heme/Allergies: Negative for environmental allergies. Psychiatric/Behavioral: Negative for depression. The patient does not have insomnia. Physical Exam  Vitals and nursing note reviewed. Constitutional:       Appearance: Normal appearance. She is well-developed. Comments: /66 (BP 1 Location: Left upper arm, BP Patient Position: Sitting, BP Cuff Size: Large adult)   Pulse 86   Temp 99.2 °F (37.3 °C) (Oral)   Resp 17   Ht 5' 2\" (1.575 m)   Wt 204 lb 12.8 oz (92.9 kg)   LMP  (LMP Unknown)   SpO2 98%   BMI 37.46 kg/m²      HENT:      Right Ear: Tympanic membrane and ear canal normal.      Left Ear: Tympanic membrane and ear canal normal.      Nose: No mucosal edema. Neck:      Thyroid: No thyromegaly. Cardiovascular:      Rate and Rhythm: Normal rate and regular rhythm. Heart sounds: Normal heart sounds. Pulmonary:      Effort: Pulmonary effort is normal.      Breath sounds: Normal breath sounds. Abdominal:      General: Bowel sounds are normal.      Palpations: Abdomen is soft. There is no mass. Tenderness: There is no abdominal tenderness. Musculoskeletal:         General: Normal range of motion. Cervical back: Normal range of motion and neck supple. Right lower leg: No edema. Left lower leg: No edema. Lymphadenopathy:      Cervical: No cervical adenopathy. Skin:     General: Skin is warm and dry. Neurological:      General: No focal deficit present. Mental Status: She is alert and oriented to person, place, and time. Psychiatric:         Mood and Affect: Mood normal.         ASSESSMENT and PLAN  Diagnoses and all orders for this visit:    1. Type 1 diabetes mellitus with retinopathy of both eyes, macular edema presence unspecified, unspecified retinopathy severity ([address]  a1c up to 8.9%.  however her pump is reading 110 BS. Discussed with Lisette. Will contact rep with her insulin pump to review the variation in BS readings.     -     AMB POC HEMOGLOBIN A1C  - AMB POC GLUCOSE, QUANTITATIVE, BLOOD  -     REFERRAL TO PODIATRY    2. Essential hypertension  Stable at goal.     3. Mixed hyperlipidemia  -     LIPID PANEL; Future    4. Encounter for long-term (current) use of insulin (HCC)//  5. Encounter for medication monitoring  -     METABOLIC PANEL, COMPREHENSIVE; Future    6. Non morbid obesity  I have reviewed/discussed the above normal BMI with the patient. I have recommended the following interventions: dietary management education, guidance, and counseling . Follow-up and Dispositions    · Return in about 3 months (around 6/2/2022). reviewed diet, exercise and weight control  cardiovascular risk and specific lipid/LDL goals reviewed  reviewed medications and side effects in detail  specific diabetic recommendations: low cholesterol diet, weight control and daily exercise discussed, all medications, side effects and compliance discussed carefully, foot care discussed and Podiatry visits discussed, annual eye examinations at Ophthalmology discussed and glycohemoglobin and other lab monitoring discussed     I have discussed diagnosis listed in this note with pt and/or family. I have discussed treatment plans and options and the risk/benefit analysis of those options, including safe use of medications and possible medication side effects. Through the use of shared decision making we have agreed to the above plan. The patient has received an after-visit summary and questions were answered concerning future plans and follow up. Advise pt of any urgent changes then to proceed to the ER.

## 2022-03-02 NOTE — PROGRESS NOTES
Identified pt with two pt identifiers(name and ). Reviewed record in preparation for visit and have obtained necessary documentation. All patient medications has been reviewed. Chief Complaint   Patient presents with    Cholesterol Problem    Diabetes    Hypertension       3 most recent PHQ Screens 3/2/2022   Little interest or pleasure in doing things Not at all   Feeling down, depressed, irritable, or hopeless Not at all   Total Score PHQ 2 0     Abuse Screening Questionnaire 3/2/2022   Do you ever feel afraid of your partner? N   Are you in a relationship with someone who physically or mentally threatens you? N   Is it safe for you to go home? Y       Health Maintenance Due   Topic    Foot Exam Q1      Health Maintenance Review: Patient reminded of \"due or due soon\" health maintenance. I have asked the patient to contact his/her primary care provider (PCP) for follow-up on his/her health maintenance. Vitals:    22 1004   BP: 122/66   Pulse: 86   Resp: 17   Temp: 99.2 °F (37.3 °C)   TempSrc: Oral   SpO2: 98%   Weight: 204 lb 12.8 oz (92.9 kg)   Height: 5' 2\" (1.575 m)   PainSc:   0 - No pain       Wt Readings from Last 3 Encounters:   22 204 lb 12.8 oz (92.9 kg)   21 204 lb 9.6 oz (92.8 kg)   10/25/21 206 lb (93.4 kg)     Temp Readings from Last 3 Encounters:   22 99.2 °F (37.3 °C) (Oral)   21 98.6 °F (37 °C) (Oral)   10/25/21 98.4 °F (36.9 °C) (Oral)     BP Readings from Last 3 Encounters:   22 122/66   21 138/70   10/25/21 (!) 172/80     Pulse Readings from Last 3 Encounters:   22 86   21 86   10/25/21 75       Coordination of Care Questionnaire:   1) Have you been to an emergency room, urgent care, or hospitalized since your last visit? No      2. Have seen or consulted any other health care provider since your last visit?  No

## 2022-03-08 ENCOUNTER — TELEPHONE (OUTPATIENT)
Dept: FAMILY MEDICINE CLINIC | Age: 57
End: 2022-03-08

## 2022-03-08 NOTE — TELEPHONE ENCOUNTER
Saw pt during visit with PCP last week. A1c not in line with [employer] pump data for pt's averages. Unable to access her BG report in carelink. Called [employer] / Estelita Friends, educator. She contacted pt and met with her. Pt stated she thinks things are resolved at this point as a new pump was sent to her from [employer] and she was able to reset her phone for the uploads, so will check back in a few days to review her data on carelink. Asked pt to contact me if she notices continued discrepancies in her BG on pump, and if she needs anything further. She has a follow up with her PCP on 6/3/22.   Bebe Blanc

## 2022-03-18 PROBLEM — K59.09 CHRONIC CONSTIPATION: Status: ACTIVE | Noted: 2018-02-06

## 2022-03-18 PROBLEM — E66.01 SEVERE OBESITY (BMI 35.0-39.9) WITH COMORBIDITY (HCC): Status: ACTIVE | Noted: 2018-03-28

## 2022-03-19 PROBLEM — Z51.81 ENCOUNTER FOR MEDICATION MONITORING: Status: ACTIVE | Noted: 2017-08-21

## 2022-03-19 PROBLEM — E10.319: Status: ACTIVE | Noted: 2017-06-09

## 2022-03-19 PROBLEM — I10 ESSENTIAL HYPERTENSION: Status: ACTIVE | Noted: 2017-08-21

## 2022-03-19 PROBLEM — Z79.4 ENCOUNTER FOR LONG-TERM (CURRENT) USE OF INSULIN (HCC): Status: ACTIVE | Noted: 2017-08-21

## 2022-03-20 PROBLEM — E78.2 MIXED HYPERLIPIDEMIA: Status: ACTIVE | Noted: 2017-08-21

## 2022-03-20 PROBLEM — E11.21 TYPE 2 DIABETES WITH NEPHROPATHY (HCC): Status: ACTIVE | Noted: 2019-09-20

## 2022-04-20 RX ORDER — IBUPROFEN 800 MG/1
TABLET ORAL
Qty: 60 TABLET | Refills: 1 | Status: SHIPPED | OUTPATIENT
Start: 2022-04-20 | End: 2022-06-24

## 2022-05-31 ENCOUNTER — TRANSCRIBE ORDER (OUTPATIENT)
Dept: SCHEDULING | Age: 57
End: 2022-05-31

## 2022-05-31 DIAGNOSIS — Z12.31 SCREENING MAMMOGRAM FOR HIGH-RISK PATIENT: Primary | ICD-10-CM

## 2022-06-24 RX ORDER — IBUPROFEN 800 MG/1
TABLET ORAL
Qty: 60 TABLET | Refills: 1 | Status: SHIPPED | OUTPATIENT
Start: 2022-06-24

## 2022-07-01 ENCOUNTER — HOSPITAL ENCOUNTER (OUTPATIENT)
Dept: MAMMOGRAPHY | Age: 57
Discharge: HOME | End: 2022-07-01
Attending: FAMILY MEDICINE
Payer: COMMERCIAL

## 2022-07-01 DIAGNOSIS — Z12.31 SCREENING MAMMOGRAM FOR HIGH-RISK PATIENT: ICD-10-CM

## 2022-07-01 PROCEDURE — 77067 SCR MAMMO BI INCL CAD: CPT

## 2022-07-28 RX ORDER — FLASH GLUCOSE SENSOR
KIT MISCELLANEOUS
Qty: 3 KIT | Refills: 3 | Status: SHIPPED | OUTPATIENT
Start: 2022-07-28 | End: 2022-11-02 | Stop reason: ALTCHOICE

## 2022-08-17 ENCOUNTER — TELEPHONE (OUTPATIENT)
Dept: FAMILY MEDICINE CLINIC | Age: 57
End: 2022-08-17

## 2022-08-17 NOTE — TELEPHONE ENCOUNTER
----- Message from Marta Gtz sent at 8/17/2022 12:22 PM EDT -----  Subject: Message to Provider    QUESTIONS  Information for Provider? Please re-fax RX for Insulin pump and supplies   to [phone].  [phone] Option #2  ---------------------------------------------------------------------------  --------------   [place] INFO  [phone]; Do not leave any message, patient will call back for answer  ---------------------------------------------------------------------------  --------------  SCRIPT ANSWERS  Relationship to Patient? Third Party  Third Party Type? Durable Medical Equipment? Representative Name?  Maykel from [employer]

## 2022-08-22 ENCOUNTER — OFFICE VISIT (OUTPATIENT)
Dept: FAMILY MEDICINE CLINIC | Age: 57
End: 2022-08-22
Payer: COMMERCIAL

## 2022-08-22 VITALS
DIASTOLIC BLOOD PRESSURE: 70 MMHG | RESPIRATION RATE: 17 BRPM | HEART RATE: 83 BPM | TEMPERATURE: 97.7 F | WEIGHT: 210.6 LBS | BODY MASS INDEX: 38.76 KG/M2 | OXYGEN SATURATION: 97 % | HEIGHT: 62 IN | SYSTOLIC BLOOD PRESSURE: 134 MMHG

## 2022-08-22 DIAGNOSIS — E66.9 NON MORBID OBESITY: ICD-10-CM

## 2022-08-22 DIAGNOSIS — Z79.4 ENCOUNTER FOR LONG-TERM (CURRENT) USE OF INSULIN (HCC): ICD-10-CM

## 2022-08-22 DIAGNOSIS — E78.2 MIXED HYPERLIPIDEMIA: ICD-10-CM

## 2022-08-22 DIAGNOSIS — I10 ESSENTIAL HYPERTENSION: ICD-10-CM

## 2022-08-22 DIAGNOSIS — E10.319 TYPE 1 DIABETES MELLITUS WITH RETINOPATHY OF BOTH EYES, MACULAR EDEMA PRESENCE UNSPECIFIED, UNSPECIFIED RETINOPATHY SEVERITY (HCC): Primary | ICD-10-CM

## 2022-08-22 DIAGNOSIS — Z51.81 ENCOUNTER FOR MEDICATION MONITORING: ICD-10-CM

## 2022-08-22 LAB
GLUCOSE POC: 147 MG/DL
HBA1C MFR BLD HPLC: 8.7 %

## 2022-08-22 PROCEDURE — 3052F HG A1C>EQUAL 8.0%<EQUAL 9.0%: CPT | Performed by: FAMILY MEDICINE

## 2022-08-22 PROCEDURE — 99214 OFFICE O/P EST MOD 30 MIN: CPT | Performed by: FAMILY MEDICINE

## 2022-08-22 PROCEDURE — 82947 ASSAY GLUCOSE BLOOD QUANT: CPT | Performed by: FAMILY MEDICINE

## 2022-08-22 PROCEDURE — 83036 HEMOGLOBIN GLYCOSYLATED A1C: CPT | Performed by: FAMILY MEDICINE

## 2022-08-22 RX ORDER — LORATADINE 10 MG/1
10 TABLET ORAL
COMMUNITY
Start: 2022-08-22

## 2022-08-22 NOTE — PROGRESS NOTES
Saw pt briefly after PCP visit to discuss GLP1 agonist, pump report data, and PA needed for sensor / supplies. As referred by PCP, Dr. Armando Arnold  Will follow up with patient after talking to [employer] regarding PA and also what seems to be a discrepancy in pt's in clinic A1c and what the report from her pump seems to show.   Lissa Oakes, PHARMD, [address] [facility] in place: Yes  Recommendation Provided To: Patient/Caregiver: 3 via In person  Intervention Detail: Device Training, New Rx: 1, reason: Needs Additional Therapy, and Patient Access Assistance/Sample Provided  Intervention Accepted By: Patient/Caregiver: 3  Time Spent (min): 20

## 2022-08-22 NOTE — PROGRESS NOTES
Chief Complaint   Patient presents with    Follow-up     3 month f/u       1. \"Have you been to the ER, urgent care clinic since your last visit? Hospitalized since your last visit? \" No    2. \"Have you seen or consulted any other health care providers outside of the [address] [place] [address] since your last visit? \" No     3. For patients aged 39-70: Has the patient had a colonoscopy / FIT/ Cologuard? Yes - no Care Gap present      If the patient is female:    4. For patients aged 41-77: Has the patient had a mammogram within the past 2 years? Yes - no Care Gap present      5. For patients aged 21-65: Has the patient had a pap smear?  Yes - no Care Gap present    Health Maintenance Due   Topic Date Due    Pneumococcal 0-64 years (2 - PCV) 05/16/2018    Foot Exam Q1  09/10/2019    COVID-19 Vaccine (4 - Booster for Pfizer series) 03/22/2022    MICROALBUMIN Q1  07/02/2022

## 2022-08-22 NOTE — PROGRESS NOTES
HISTORY OF PRESENT ILLNESS  Rosie Frausto is a 64 y.o. female. HPI   Follow up on chronic medical problems. Overall has been feeling well. Cardiovascular Review:  The patient has hypertension and hyperlipidemia. Diet and Lifestyle: generally follows a low fat low cholesterol diet, generally follows a low sodium diet, exercises sporadically, nonsmoker  Home BP Monitoring: is not measured at home. Pertinent ROS: taking medications as instructed, no medication side effects noted, no TIA's, no chest pain on exertion, no dyspnea on exertion, no swelling of ankles, no palpitations, no muscle aches or pain. Diabetes Mellitus:  She has diabetes mellitus type 1 dx at the age of 25. She has been on insulin since her diagnoses. She has retinopathy. She is adjusting well to her insulin pump. BS had been improved and ranging in the low to mid 100s. However recently her BS readings have been higher in the high 100s to low 200s. She thinks it is d/t increased stress and stress eating. Has had regular contact with rep with insulin pump. Diabetic ROS - medication compliance: compliant most of the time, diabetic diet compliance: compliant most of the time, home glucose monitoring: is performed regularly with the [employer] system. She has  been exercising some with walking 2 to 3 times a week. Further diabetic ROS: no chest pain, dyspnea or TIA's, no numbness, has some  tingling in the extremities which has been stable. Vision has been stable.      Patient Active Problem List   Diagnosis Code    Gastroesophageal reflux disease without esophagitis K21.9    History of hysterectomy for benign disease Z90.710    Diabetic retinopathy ([address] E11.319    Type 1 diabetes mellitus with retinopathy of right eye without macular edema ([address] E10.319    Encounter for long-term (current) use of insulin ([address] Z79.4    Encounter for medication monitoring Z51.81    Essential hypertension I10    Mixed hyperlipidemia E78.2 Chronic constipation K59.09    Severe obesity (BMI 35.0-39. 9) with comorbidity ([facility]) E66.01    Uncontrolled type 1 diabetes mellitus with retinopathy of both eyes [id number]    Type 2 diabetes with nephropathy ([facility]) E11.21       Current Outpatient Medications   Medication Sig Dispense Refill    flash glucose sensor (FreeStyle Soo 2 Sensor) kit USE TO MONITOR BLOOD SUGAR MULTIPLE TIMES DAILY AS DIRECTED 3 Kit 3    ibuprofen (MOTRIN) 800 mg tablet TAKE 1 TABLET BY MOUTH TWICE DAILY AS NEEDED FOR PAIN 60 Tablet 1    albuterol (PROVENTIL HFA, VENTOLIN HFA, PROAIR HFA) 90 mcg/actuation inhaler INHALE 2 PUFFS BY MOUTH EVERY 4 HOURS AS NEEDED FOR WHEEZING OR SHORTNESS OF BREATH 18 g 3    FreeStyle Soo 14 Day Robertson misc USE EVERY 14 DAYS TO TEST BLOOD SUGAR 2 Each 0    FreeStyle Soo 14 Day Robertson misc USE EVERY 14 DAYS TO TEST BLOOD SUGAR 2 Each 0    cyanocobalamin (VITAMIN B12) 1,000 mcg/mL injection INJECT 1 ML INTO THE MUSCLE EVERY 30 DAYS 14 mL 3    alirocumab (Praluent Pen) 75 mg/mL injector pen 1 mL by SubCUTAneous route Once every 2 weeks. 2 mL 11    lancets (Accu-Chek Fastclix Lancet Drum) misc Use to check sugar 3 times daily to calibrate with insulin pump / sensor as directed. 1 Each 11    amLODIPine (NORVASC) 5 mg tablet Take 2 Tablets by mouth daily. 180 Tablet 3    insulin lispro (HUMALOG) 100 unit/mL injection Use with insulin pump for total daily dose of 150 units as directed. Use 10 units with breakfast, 10 units with lunch, and 12 units with dinner for bolus doses as directed. Dispense vials please.  1 Each 0    glucose blood VI test strips (FreeStyle Precision Justice Strips) strip Use to check blood sugar with fingerstick when [facility] advises you to do so. 50 Strip 2    fexofenadine (ALLEGRA) 180 mg tablet TAKE 1 TABLET BY MOUTH DAILY AS NEEDED FOR ALLERGIES OR RHINITIS 30 Tablet 6    pantoprazole (PROTONIX) 40 mg tablet TAKE 1 TABLET BY MOUTH ONCE DAILY 30 Tablet 11    lisinopriL (PRINIVIL, ZESTRIL) 40 mg tablet Take 1 Tablet by mouth daily. 90 Tablet 3    loratadine (Claritin) 10 mg tablet Take 10 mg by mouth daily as needed for Allergies. (Patient not taking: Reported on 3/2/2022)      atorvastatin (LIPITOR) 80 mg tablet TAKE 1 TABLET BY MOUTH DAILY 30 Tab 11    topiramate (TOPAMAX) 50 mg tablet TAKE 1 TABLET BY MOUTH TWICE DAILY 60 Tab 5    fluticasone propionate (FLONASE) 50 mcg/actuation nasal spray INSTILL 2 SPRAYS IN EACH NOSTRIL ONCE DAILY AS NEEDED FOR NASAL CONGESTION 1 Bottle 6    ezetimibe (ZETIA) 10 mg tablet TAKE 1 TABLET BY MOUTH DAILY 30 Tab 11    linaclotide (LINZESS) 290 mcg cap capsule Take 290 mcg by mouth Daily (before breakfast).          Allergies   Allergen Reactions    Lasix [Furosemide] Hives    Percocet [Oxycodone-Acetaminophen] Other (comments)     AMS           Past Medical History:   Diagnosis Date    Diabetes ([address]     Diabetic retinopathy ([address]     right eye     Gestational diabetes     Hypercholesterolemia     Hypertension     Type 1 diabetes ([facility]            Past Surgical History:   Procedure Laterality Date    HX BREAST REDUCTION Bilateral     HX CATARACT REMOVAL  2018    left eye    HX  SECTION  1990    HX  SECTION  2000    HX COLONOSCOPY      HX HEENT  2016    right eye surgery- repaired blood vessel    HX HEENT  2017    left eye surgery- removal of scar tissue    HX HEENT  2018    left eye-laser    HX HEENT  10/23/2018    left eye injection    HX HEENT  2018    left eye intravitreal injection    HX HEENT  2019    left eye intravitreal injection    HX HYSTERECTOMY      partial           Family History   Problem Relation Age of Onset    Hypertension Mother     Diabetes Father     No Known Problems Brother     Suicide Brother     Asthma Daughter     Asthma Son     Psoriasis Son        Social History     Tobacco Use    Smoking status: Never    Smokeless tobacco: Never   Substance Use Topics    Alcohol use: No     Alcohol/week: 0.0 standard drinks        Lab Results   Component Value Date/Time    WBC 6.4 07/02/2021 11:31 AM    HGB 12.0 07/02/2021 11:31 AM    HCT 40.2 07/02/2021 11:31 AM    PLATELET 837 99/45/8403 11:31 AM    MCV 83.9 07/02/2021 11:31 AM     Lab Results   Component Value Date/Time    Cholesterol, total 121 03/02/2022 11:05 AM    HDL Cholesterol 70 03/02/2022 11:05 AM    LDL, calculated 35.6 03/02/2022 11:05 AM    Triglyceride 77 03/02/2022 11:05 AM    CHOL/HDL Ratio 1.7 03/02/2022 11:05 AM     Lab Results   Component Value Date/Time    TSH 0.942 05/16/2017 03:47 PM      Lab Results   Component Value Date/Time    Sodium 139 03/02/2022 11:05 AM    Potassium 4.2 03/02/2022 11:05 AM    Chloride 108 03/02/2022 11:05 AM    CO2 29 03/02/2022 11:05 AM    Anion gap 2 (L) 03/02/2022 11:05 AM    Glucose 212 (H) 03/02/2022 11:05 AM    BUN 15 03/02/2022 11:05 AM    Creatinine 0.99 03/02/2022 11:05 AM    BUN/Creatinine ratio 15 03/02/2022 11:05 AM    GFR est AA >60 03/02/2022 11:05 AM    GFR est non-AA 58 (L) 03/02/2022 11:05 AM    Calcium 9.2 03/02/2022 11:05 AM    Bilirubin, total 0.3 03/02/2022 11:05 AM    ALT (SGPT) 36 03/02/2022 11:05 AM    Alk. phosphatase 129 (H) 03/02/2022 11:05 AM    Protein, total 6.8 03/02/2022 11:05 AM    Albumin 3.6 03/02/2022 11:05 AM    Globulin 3.2 03/02/2022 11:05 AM    A-G Ratio 1.1 03/02/2022 11:05 AM      Lab Results   Component Value Date/Time    Hemoglobin A1c 11.5 (H) 02/25/2010 08:25 AM    Hemoglobin A1c (POC) 8.9 03/02/2022 10:18 AM         Review of Systems   Constitutional:  Negative for malaise/fatigue. HENT:  Negative for congestion. Eyes:  Negative for blurred vision. Respiratory:  Negative for cough and shortness of breath. Cardiovascular:  Negative for chest pain, palpitations and leg swelling. Gastrointestinal:  Negative for abdominal pain, constipation and heartburn. Genitourinary:  Negative for dysuria, frequency and urgency.    Musculoskeletal:  Negative for back pain and joint pain. Neurological:  Negative for dizziness, tingling and headaches. Endo/Heme/Allergies:  Negative for environmental allergies. Psychiatric/Behavioral:  Negative for depression. The patient does not have insomnia. Physical Exam  Vitals and nursing note reviewed. Constitutional:       Appearance: Normal appearance. She is well-developed. Comments: /70   Pulse 83   Temp 97.7 °F (36.5 °C) (Oral)   Resp 17   Ht 5' 2\" (1.575 m)   Wt 210 lb 9.6 oz (95.5 kg)   LMP  (LMP Unknown)   SpO2 97%   BMI 38.52 kg/m²    HENT:      Right Ear: Tympanic membrane and ear canal normal.      Left Ear: Tympanic membrane and ear canal normal.   Neck:      Thyroid: No thyromegaly. Cardiovascular:      Rate and Rhythm: Normal rate and regular rhythm. Heart sounds: Normal heart sounds. Pulmonary:      Effort: Pulmonary effort is normal.      Breath sounds: Normal breath sounds. Abdominal:      General: Bowel sounds are normal.      Palpations: Abdomen is soft. There is no mass. Tenderness: There is no abdominal tenderness. Musculoskeletal:         General: Normal range of motion. Cervical back: Normal range of motion and neck supple. Right lower leg: No edema. Left lower leg: No edema. Lymphadenopathy:      Cervical: No cervical adenopathy. Skin:     General: Skin is warm and dry. Neurological:      General: No focal deficit present. Mental Status: She is alert and oriented to person, place, and time. Psychiatric:         Mood and Affect: Mood normal.     ASSESSMENT and PLAN  Diagnoses and all orders for this visit:    1. Type 1 diabetes mellitus with retinopathy of both eyes, macular edema presence unspecified, unspecified retinopathy severity (HCC)  A1c up to 8.7%. Pt is meeting with Lisette to review her graft for her BS and insulin adjustment.     -     AMB POC HEMOGLOBIN A1C  -     AMB POC GLUCOSE, QUANTITATIVE, BLOOD  -     MICROALBUMIN, UR, RAND W/ MICROALB/CREAT RATIO; Future    2. Essential hypertension  Discussed sodium restriction, high k rich diet, maintaining ideal body weight and regular exercise program such as daily walking 30 min perday 4-5 times per week, as physiologic means to achieve blood pressure control. Medication compliance advised. -     TSH 3RD GENERATION; Future    3. Mixed hyperlipidemia  Continue to monitor. Work on diet and exercise.  -     LIPID PANEL; Future    4. Encounter for long-term (current) use of insulin ([facility]  5. Encounter for medication monitoring  -     CBC W/O DIFF; Future  -     METABOLIC PANEL, COMPREHENSIVE; Future  -     URINALYSIS W/MICROSCOPIC; Future    6. Non morbid obesity  I have reviewed/discussed the above normal BMI with the patient. I have recommended the following interventions: dietary management education, guidance, and counseling . Follow-up and Dispositions    Return in about 3 months (around 11/22/2022). reviewed diet, exercise and weight control  cardiovascular risk and specific lipid/LDL goals reviewed  reviewed medications and side effects in detail  specific diabetic recommendations: low cholesterol diet, weight control and daily exercise discussed, foot care discussed and Podiatry visits discussed, annual eye examinations at Ophthalmology discussed, and glycohemoglobin and other lab monitoring discussed      I have discussed diagnosis listed in this note with pt and/or family. I have discussed treatment plans and options and the risk/benefit analysis of those options, including safe use of medications and possible medication side effects. Through the use of shared decision making we have agreed to the above plan. The patient has received an after-visit summary and questions were answered concerning future plans and follow up. Advise pt of any urgent changes then to proceed to the ER.

## 2022-08-23 LAB
ALBUMIN SERPL-MCNC: 3.5 G/DL (ref 3.5–5)
ALBUMIN/GLOB SERPL: 1 {RATIO} (ref 1.1–2.2)
ALP SERPL-CCNC: 142 U/L (ref 45–117)
ALT SERPL-CCNC: 48 U/L (ref 12–78)
ANION GAP SERPL CALC-SCNC: 5 MMOL/L (ref 5–15)
APPEARANCE UR: ABNORMAL
AST SERPL-CCNC: 21 U/L (ref 15–37)
BACTERIA URNS QL MICRO: ABNORMAL /HPF
BILIRUB SERPL-MCNC: 0.3 MG/DL (ref 0.2–1)
BILIRUB UR QL: NEGATIVE
BUN SERPL-MCNC: 10 MG/DL (ref 6–20)
BUN/CREAT SERPL: 9 (ref 12–20)
CALCIUM SERPL-MCNC: 9.3 MG/DL (ref 8.5–10.1)
CHLORIDE SERPL-SCNC: 106 MMOL/L (ref 97–108)
CHOLEST SERPL-MCNC: 211 MG/DL
CO2 SERPL-SCNC: 28 MMOL/L (ref 21–32)
COLOR UR: ABNORMAL
CREAT SERPL-MCNC: 1.09 MG/DL (ref 0.55–1.02)
CREAT UR-MCNC: 98.7 MG/DL
EPITH CASTS URNS QL MICRO: ABNORMAL /LPF
ERYTHROCYTE [DISTWIDTH] IN BLOOD BY AUTOMATED COUNT: 13.9 % (ref 11.5–14.5)
GLOBULIN SER CALC-MCNC: 3.5 G/DL (ref 2–4)
GLUCOSE SERPL-MCNC: 121 MG/DL (ref 65–100)
GLUCOSE UR STRIP.AUTO-MCNC: NEGATIVE MG/DL
HCT VFR BLD AUTO: 41.9 % (ref 35–47)
HDLC SERPL-MCNC: 73 MG/DL
HDLC SERPL: 2.9 {RATIO} (ref 0–5)
HGB BLD-MCNC: 12.7 G/DL (ref 11.5–16)
HGB UR QL STRIP: NEGATIVE
HYALINE CASTS URNS QL MICRO: ABNORMAL /LPF (ref 0–5)
KETONES UR QL STRIP.AUTO: NEGATIVE MG/DL
LDLC SERPL CALC-MCNC: 114.6 MG/DL (ref 0–100)
LEUKOCYTE ESTERASE UR QL STRIP.AUTO: ABNORMAL
MCH RBC QN AUTO: 25.2 PG (ref 26–34)
MCHC RBC AUTO-ENTMCNC: 30.3 G/DL (ref 30–36.5)
MCV RBC AUTO: 83.3 FL (ref 80–99)
MICROALBUMIN UR-MCNC: 0.9 MG/DL
MICROALBUMIN/CREAT UR-RTO: 9 MG/G (ref 0–30)
NITRITE UR QL STRIP.AUTO: NEGATIVE
NRBC # BLD: 0 K/UL (ref 0–0.01)
NRBC BLD-RTO: 0 PER 100 WBC
PH UR STRIP: 6.5 [PH] (ref 5–8)
PLATELET # BLD AUTO: 261 K/UL (ref 150–400)
PMV BLD AUTO: 12.3 FL (ref 8.9–12.9)
POTASSIUM SERPL-SCNC: 4.4 MMOL/L (ref 3.5–5.1)
PROT SERPL-MCNC: 7 G/DL (ref 6.4–8.2)
PROT UR STRIP-MCNC: NEGATIVE MG/DL
RBC # BLD AUTO: 5.03 M/UL (ref 3.8–5.2)
RBC #/AREA URNS HPF: ABNORMAL /HPF (ref 0–5)
SODIUM SERPL-SCNC: 139 MMOL/L (ref 136–145)
SP GR UR REFRACTOMETRY: 1.01 (ref 1–1.03)
TRIGL SERPL-MCNC: 117 MG/DL (ref ?–150)
TSH SERPL DL<=0.05 MIU/L-ACNC: 1.25 UIU/ML (ref 0.36–3.74)
UROBILINOGEN UR QL STRIP.AUTO: 0.2 EU/DL (ref 0.2–1)
VLDLC SERPL CALC-MCNC: 23.4 MG/DL
WBC # BLD AUTO: 6.5 K/UL (ref 3.6–11)
WBC URNS QL MICRO: ABNORMAL /HPF (ref 0–4)

## 2022-08-29 ENCOUNTER — TELEPHONE (OUTPATIENT)
Dept: FAMILY MEDICINE CLINIC | Age: 57
End: 2022-08-29

## 2022-08-29 DIAGNOSIS — E10.319 TYPE 1 DIABETES MELLITUS WITH RETINOPATHY OF BOTH EYES, MACULAR EDEMA PRESENCE UNSPECIFIED, UNSPECIFIED RETINOPATHY SEVERITY (HCC): Primary | ICD-10-CM

## 2022-08-29 DIAGNOSIS — E66.01 SEVERE OBESITY (BMI 35.0-39.9) WITH COMORBIDITY (HCC): ICD-10-CM

## 2022-08-29 RX ORDER — SEMAGLUTIDE 1.34 MG/ML
INJECTION, SOLUTION SUBCUTANEOUS
Qty: 1 BOX | Refills: 1 | Status: SHIPPED | OUTPATIENT
Start: 2022-08-29 | End: 2022-08-30

## 2022-08-29 NOTE — TELEPHONE ENCOUNTER
Sending orders for ozempic start as discussed with Dr. Stefani Olivier. Diabetes guidelines for DM1 recommend use of GLP1 agonist in patients with DM1 with elevated BMIs and high CVD risk, which this patient has. Per Diabetes CPA and discussion with Dr. Stefani Olivier. Will follow up on coverage / teaching with patient. Attila Abadlla PHARMD, [address] in place: Yes  Recommendation Provided To: Patient/Caregiver: 2 via Telephone  Intervention Detail: New Rx: 1, reason: Needs Additional Therapy and Patient Access Assistance/Sample Provided  Intervention Accepted By: Patient/Caregiver: 2  Time Spent (min): 30      Addendum:  PA completed for Ozempic but denied with rationale pt does not have DM2 - will try to appeal given information noted above    Attila Abdalla PHARMD, [address] in place: Yes  Recommendation Provided To:  Other: 2  Intervention Detail: Patient Access Assistance/Sample Provided  Intervention Accepted By: Patient/Caregiver: 2  Time Spent (min): 30

## 2022-08-30 RX ORDER — SEMAGLUTIDE 1.34 MG/ML
INJECTION, SOLUTION SUBCUTANEOUS
Qty: 1 BOX | Refills: 1 | Status: SHIPPED | OUTPATIENT
Start: 2022-08-30 | End: 2022-09-09

## 2022-09-01 DIAGNOSIS — I10 ESSENTIAL HYPERTENSION: ICD-10-CM

## 2022-09-01 RX ORDER — AMLODIPINE BESYLATE 5 MG/1
10 TABLET ORAL DAILY
Qty: 180 TABLET | Refills: 3 | Status: SHIPPED | OUTPATIENT
Start: 2022-09-01

## 2022-09-09 RX ORDER — SEMAGLUTIDE 0.25 MG/.5ML
0.25 INJECTION, SOLUTION SUBCUTANEOUS
Qty: 4 EACH | Refills: 0 | Status: SHIPPED | OUTPATIENT
Start: 2022-09-09 | End: 2022-09-21

## 2022-09-09 NOTE — PROGRESS NOTES
Sending order for wegovy to try for weight loss as ozempic not approved due to DM1, even though there's evidence to use it in DM1 patients with elevated BMIs it's not FDA indicated yet so PA / Appeal denied  Main goal for pt with use is weight loss   Will follow up to see if PA comes through and keep pt informed.    Maria Guadalupe Brown, PHARMD, [address] [place] [address] in place: Yes  Recommendation Provided To: Pharmacy: 2  Intervention Detail: Discontinued Rx: 1, reason: Cost/Formulary Change and New Rx: 1, reason: Needs Additional Therapy  Intervention Accepted By: Pharmacy: 2  Time Spent (min): 15

## 2022-09-21 ENCOUNTER — OFFICE VISIT (OUTPATIENT)
Dept: FAMILY MEDICINE CLINIC | Age: 57
End: 2022-09-21

## 2022-09-21 DIAGNOSIS — E66.01 SEVERE OBESITY (BMI 35.0-39.9) WITH COMORBIDITY (HCC): ICD-10-CM

## 2022-09-21 DIAGNOSIS — E10.319 TYPE 1 DIABETES MELLITUS WITH RETINOPATHY OF BOTH EYES, MACULAR EDEMA PRESENCE UNSPECIFIED, UNSPECIFIED RETINOPATHY SEVERITY (HCC): Primary | ICD-10-CM

## 2022-09-21 RX ORDER — SEMAGLUTIDE 1.34 MG/ML
INJECTION, SOLUTION SUBCUTANEOUS
Qty: 1 BOX | Refills: 0 | Status: SHIPPED | COMMUNITY
Start: 2022-09-21 | End: 2022-11-02 | Stop reason: SDUPTHER

## 2022-09-21 NOTE — PROGRESS NOTES
Pt presents for ozempic sample, using for weight loss  Pt has DM1 however elevated BMI   PA approved for [facility] for weight loss but not available; using ozempic sample to get patient started as same medication   When [facility] available will switch pt to equivalent dose as indicated   Per Dr. Michael Bee, and CPA with Dr. Cindy Mckeon in detail, how to inject, store, attach pen needle and dispose of pen needle, use on needle per injection , injection sites, rotation of sites, once weekly - pick day - what to do if miss day, potential side effects and what to do, holding pen in site after injecting to make sure medication is fully injected, expected effects, dose titration, amount of medication in pen, how the medication works, documenting doses given on ozempic box. Patient able to demonstrate on sample pen appropriate use and she has used insulin pens many times before starting with her insulin pump. Will follow up in 6 weeks to assess how pt is doing and provide another sample pen as indicates. When she goes to the next dose after 0.5 mg the [facility] may be available at the higher doses / will assess at that time. Showed the [facility] demo pen and will review more detail when she gets this. Talked about insulin pumps available currently in the [address] [place] [address]. She has the medtronic pump but is frustrated with how large it is, the tubing, and the lack of correlation between her sensor readings and her A1c in clinic. She took a handout on omnipod5 and wants to look into it. Follow up in 6 weeks; call before if issues. Patient verbalized understanding of information presented. Answered all of the patient's questions.       Juanito Lopes, PHARMD, [address] [place] [address] in place: Yes  Recommendation Provided To: Patient/Caregiver: 5 via In person  Intervention Detail: Device Training, Discontinued Rx: 1, reason: Cost/Formulary Change, Patient Access Assistance/Sample Provided, and Scheduled Appointment  Intervention Accepted By: Patient/Caregiver: 5  Time Spent (min): 60

## 2022-09-29 ENCOUNTER — TELEPHONE (OUTPATIENT)
Dept: FAMILY MEDICINE CLINIC | Age: 57
End: 2022-09-29

## 2022-09-29 RX ORDER — INSULIN PMP CART,AUT,G6/7,CNTR
1 EACH SUBCUTANEOUS
Qty: 6 BOX | Refills: 11 | Status: SHIPPED | OUTPATIENT
Start: 2022-09-29

## 2022-09-29 RX ORDER — INSULIN PMP CART,AUT,G6/7,CNTR
1 EACH SUBCUTANEOUS
Qty: 1 KIT | Refills: 0 | Status: SHIPPED | OUTPATIENT
Start: 2022-09-29

## 2022-09-29 NOTE — TELEPHONE ENCOUNTER
Pt wanting to try tubeless insulin pump (she's using medtronic pump now); sending omnipod prescriptions to pharmacy  To check on coverage. Per CPA with Dr. David Muñiz   Will inform pt sent. If she decides to pick it up and try it; she will need to register online and the omnipod  will contact her for training. Asked pt to let us know what she plans to do so can be coordinated.   If she stays with omnipod will need to try to change from [place] to dexcom so the 2 systems can communicate   Russell Nash PHARMD, [address] in place: Yes  Recommendation Provided To: Patient/Caregiver: 2 via Telephone  Intervention Detail: New Rx: 2, reason: Patient Preference  Intervention Accepted By: Patient/Caregiver: 2  Time Spent (min): 30

## 2022-10-07 ENCOUNTER — TELEPHONE (OUTPATIENT)
Dept: FAMILY MEDICINE CLINIC | Age: 57
End: 2022-10-07

## 2022-10-07 NOTE — TELEPHONE ENCOUNTER
Contacted patient regarding omnipod5  Orders were sent  Pt picked up, will get training appt in the next few weeks  Will need to change to dexcom for CGM if stays with omnipod if would like them to connect  Will follow up with pt next week and leave dexcom samples to trial.  Katerina Spivey, PHARMD, [address] in place: Yes  Recommendation Provided To: Patient/Caregiver: 2 via Telephone  Intervention Detail: Patient Access Assistance/Sample Provided  Intervention Accepted By: Patient/Caregiver: 2  Time Spent (min): 15

## 2022-11-01 NOTE — PROGRESS NOTES
HPI:   Pt presenting for follow up DM1, weight management (BMI of 38) visit after staring ozempic (using sample as pt approved for [facility] but not available from ) - will need new sample pen at visit and a check on weight. Since last visit also picked up omnipod5 pump and started using dexcomdexcom. Will check on status of training for that and try to link up her account with [facility] so can view her glucose monitoring data. Last A1c was 8.7% (which was not in line with the CGM / medtronic pump data she was seeing, which indicated she would have a lower A1c). S/O:   Pt presents feeling well overall - is tolerating ozempic but having some acid reflux / \"burning\" through esophagus - already on protonix, asked to consider trying Tums - it may be that she's getting used to staying full longer as she's noticed she's eating less, smaller meals and not as much; has noticed bolus insulin doses go down; is adjusting it to try to keep things more level  This AM was 101 upon waking, after breakfast 128 - is using less bolus insulin   Started dexcom 4-5 days ago and likes it - hasn't downloaded the dexcom clarity nico yet, will do today and link up to clinic  Regarding omnipod, has not had training yet but is trying to set it up; is frustrated about this. A/P  Diabetes is stable, using dexcom now and starting ozempic (as wegovy still not available) still using medtronic pump as not training yet for omnipod. Has lost 10 pounds! Provided another ozempic sample pen per CPA with Dr. Jolene Khan  Samples logged, documented and signed out per policy. Will continue current therapy - if sx don't improve or get worse may will reconsider the GLP1 for weight loss   Set up dexcom [employer] with clinic  Got training set up for omnipod 5 in the clinic in 2 weeks. Patient verbalized understanding of information presented. Answered all of the patient's questions.      Pia Foss, PHARMD, [address] [place] [address] Tracking Only    CPA in place: Yes  Recommendation Provided To: Patient/Caregiver: 4 via In person  Intervention Detail: Device Training, Patient Access Assistance/Sample Provided, and Scheduled Appointment  Intervention Accepted By: Patient/Caregiver: 4  Time Spent (min): 60

## 2022-11-02 ENCOUNTER — OFFICE VISIT (OUTPATIENT)
Dept: FAMILY MEDICINE CLINIC | Age: 57
End: 2022-11-02

## 2022-11-02 VITALS — BODY MASS INDEX: 36.58 KG/M2 | WEIGHT: 200 LBS

## 2022-11-02 DIAGNOSIS — E10.319 TYPE 1 DIABETES MELLITUS WITH RETINOPATHY OF BOTH EYES, MACULAR EDEMA PRESENCE UNSPECIFIED, UNSPECIFIED RETINOPATHY SEVERITY (HCC): Primary | ICD-10-CM

## 2022-11-02 RX ORDER — SEMAGLUTIDE 1.34 MG/ML
0.5 INJECTION, SOLUTION SUBCUTANEOUS
Qty: 2 BOX | Refills: 0 | Status: SHIPPED | COMMUNITY
Start: 2022-11-02

## 2022-11-14 ENCOUNTER — TELEPHONE (OUTPATIENT)
Dept: FAMILY MEDICINE CLINIC | Age: 57
End: 2022-11-14

## 2022-11-14 NOTE — TELEPHONE ENCOUNTER
Omnipod  Lalita Sterling Contacted me and let me know pt doing virtual training for omnipod today vs. In person. Called pt to confirm and she did confirm. Asked to call if any questions after training  Sent signed pump orders and most recent dexcom report to .    Olivier Rosales, PHARMD, [address] [place] [address] in place: Yes  Recommendation Provided To: Patient/Caregiver: 2 via Telephone and Other: 2  Intervention Detail: Device Training and Patient Access Assistance/Sample Provided  Intervention Accepted By: Patient/Caregiver: 2 and Other: 2  Time Spent (min): 30

## 2022-11-16 ENCOUNTER — TELEPHONE (OUTPATIENT)
Dept: FAMILY MEDICINE CLINIC | Age: 57
End: 2022-11-16

## 2022-11-16 NOTE — TELEPHONE ENCOUNTER
Transition to omnipod insulin pump  Needs  dexcom sensors  Orders sent per CPA with Dr. Lisa Astudillo, PHARMD, [address] [place] [address] in place: Yes  Recommendation Provided To: Pharmacy: 2  Intervention Detail: New Rx: 2, reason: Needs Additional Therapy  Intervention Accepted By: Pharmacy: 2  Time Spent (min): 20

## 2022-11-17 RX ORDER — BLOOD-GLUCOSE SENSOR
EACH MISCELLANEOUS
Qty: 9 EACH | Refills: 3 | Status: SHIPPED | OUTPATIENT
Start: 2022-11-17

## 2022-11-29 ENCOUNTER — TELEPHONE (OUTPATIENT)
Dept: FAMILY MEDICINE CLINIC | Age: 57
End: 2022-11-29

## 2022-11-29 ENCOUNTER — OFFICE VISIT (OUTPATIENT)
Dept: FAMILY MEDICINE CLINIC | Age: 57
End: 2022-11-29

## 2022-11-29 VITALS
OXYGEN SATURATION: 100 % | BODY MASS INDEX: 35.51 KG/M2 | DIASTOLIC BLOOD PRESSURE: 70 MMHG | TEMPERATURE: 98.7 F | SYSTOLIC BLOOD PRESSURE: 134 MMHG | RESPIRATION RATE: 12 BRPM | HEART RATE: 83 BPM | HEIGHT: 62 IN | WEIGHT: 193 LBS

## 2022-11-29 DIAGNOSIS — E78.2 MIXED HYPERLIPIDEMIA: ICD-10-CM

## 2022-11-29 DIAGNOSIS — Z51.81 ENCOUNTER FOR MEDICATION MONITORING: ICD-10-CM

## 2022-11-29 DIAGNOSIS — E66.9 NON MORBID OBESITY: ICD-10-CM

## 2022-11-29 DIAGNOSIS — I10 ESSENTIAL HYPERTENSION: ICD-10-CM

## 2022-11-29 DIAGNOSIS — E10.319 TYPE 1 DIABETES MELLITUS WITH RETINOPATHY OF BOTH EYES, MACULAR EDEMA PRESENCE UNSPECIFIED, UNSPECIFIED RETINOPATHY SEVERITY (HCC): Primary | ICD-10-CM

## 2022-11-29 DIAGNOSIS — Z23 ENCOUNTER FOR IMMUNIZATION: ICD-10-CM

## 2022-11-29 DIAGNOSIS — Z23 NEEDS FLU SHOT: ICD-10-CM

## 2022-11-29 DIAGNOSIS — Z79.4 ENCOUNTER FOR LONG-TERM (CURRENT) USE OF INSULIN (HCC): ICD-10-CM

## 2022-11-29 LAB
ANION GAP SERPL CALC-SCNC: 7 MMOL/L (ref 5–15)
BUN SERPL-MCNC: 12 MG/DL (ref 6–20)
BUN/CREAT SERPL: 12 (ref 12–20)
CALCIUM SERPL-MCNC: 8.7 MG/DL (ref 8.5–10.1)
CHLORIDE SERPL-SCNC: 108 MMOL/L (ref 97–108)
CHOLEST SERPL-MCNC: 175 MG/DL
CO2 SERPL-SCNC: 27 MMOL/L (ref 21–32)
CREAT SERPL-MCNC: 0.98 MG/DL (ref 0.55–1.02)
GLUCOSE POC: 179 MG/DL
GLUCOSE SERPL-MCNC: 190 MG/DL (ref 65–100)
HBA1C MFR BLD HPLC: 7.6 %
HDLC SERPL-MCNC: 56 MG/DL
HDLC SERPL: 3.1 {RATIO} (ref 0–5)
LDLC SERPL CALC-MCNC: 103.4 MG/DL (ref 0–100)
POTASSIUM SERPL-SCNC: 4.3 MMOL/L (ref 3.5–5.1)
SODIUM SERPL-SCNC: 142 MMOL/L (ref 136–145)
TRIGL SERPL-MCNC: 78 MG/DL (ref ?–150)
VLDLC SERPL CALC-MCNC: 15.6 MG/DL

## 2022-11-29 RX ORDER — SEMAGLUTIDE 1.34 MG/ML
0.5 INJECTION, SOLUTION SUBCUTANEOUS
Qty: 1 BOX | Refills: 0 | Status: SHIPPED | COMMUNITY
Start: 2022-11-29

## 2022-11-29 RX ORDER — OMEPRAZOLE 40 MG/1
1 CAPSULE, DELAYED RELEASE ORAL DAILY
COMMUNITY
Start: 2022-11-19

## 2022-11-29 NOTE — PATIENT INSTRUCTIONS
Vaccine Information Statement    Influenza (Flu) Vaccine (Inactivated or Recombinant): What You Need to Know    Many vaccine information statements are available in Serbian and other languages. See www.immunize.org/vis. Hojas de información sobre vacunas están disponibles en español y en muchos otros idiomas. Visite www.immunize.org/vis. 1. Why get vaccinated? Influenza vaccine can prevent influenza (flu). Flu is a contagious disease that spreads around the [facility] every year, usually between October and May. Anyone can get the flu, but it is more dangerous for some people. Infants and young children, people 72 years and older, pregnant people, and people with certain health conditions or a weakened immune system are at greatest risk of flu complications. Pneumonia, bronchitis, sinus infections, and ear infections are examples of flu-related complications. If you have a medical condition, such as heart disease, cancer, or diabetes, flu can make it worse. Flu can cause fever and chills, sore throat, muscle aches, fatigue, cough, headache, and runny or stuffy nose. Some people may have vomiting and diarrhea, though this is more common in children than adults. In an average year, thousands of people in the [facility] die from flu, and many more are hospitalized. Flu vaccine prevents millions of illnesses and flu-related visits to the doctor each year. 2. Influenza vaccines     CDC recommends everyone 6 months and older get vaccinated every flu season. Children 6 months through 6years of age may need 2 doses during a single flu season. Everyone else needs only 1 dose each flu season. It takes about 2 weeks for protection to develop after vaccination. There are many flu viruses, and they are always changing. Each year a new flu vaccine is made to protect against the influenza viruses believed to be likely to cause disease in the upcoming flu season.  Even when the vaccine doesnt exactly match these viruses, it may still provide some protection. Influenza vaccine does not cause flu. Influenza vaccine may be given at the same time as other vaccines. 3. Talk with your health care provider    Tell your vaccination provider if the person getting the vaccine:  Has had an allergic reaction after a previous dose of influenza vaccine, or has any severe, life-threatening allergies   Has ever had Guillain-Barré Syndrome (also called GBS)    In some cases, your health care provider may decide to postpone influenza vaccination until a future visit. Influenza vaccine can be administered at any time during pregnancy. People who are or will be pregnant during influenza season should receive inactivated influenza vaccine. People with minor illnesses, such as a cold, may be vaccinated. People who are moderately or severely ill should usually wait until they recover before getting influenza vaccine. Your health care provider can give you more information. 4. Risks of a vaccine reaction    Soreness, redness, and swelling where the shot is given, fever, muscle aches, and headache can happen after influenza vaccination. There may be a very small increased risk of Guillain-Barré Syndrome (GBS) after inactivated influenza vaccine (the flu shot). Tammie Murillo children who get the flu shot along with pneumococcal vaccine (PCV13) and/or DTaP vaccine at the same time might be slightly more likely to have a seizure caused by fever. Tell your health care provider if a child who is getting flu vaccine has ever had a seizure. People sometimes faint after medical procedures, including vaccination. Tell your provider if you feel dizzy or have vision changes or ringing in the ears. As with any medicine, there is a very remote chance of a vaccine causing a severe allergic reaction, other serious injury, or death. 5. What if there is a serious problem?     An allergic reaction could occur after the vaccinated person leaves the clinic. If you see signs of a severe allergic reaction (hives, swelling of the face and throat, difficulty breathing, a fast heartbeat, dizziness, or weakness), call 9-1-1 and get the person to the nearest hospital.    For other signs that concern you, call your health care provider. Adverse reactions should be reported to the Vaccine Adverse Event Reporting System (VAERS). Your health care provider will usually file this report, or you can do it yourself. Visit the VAERS website at www.vaers. [facility].gov or call 7-280.760.1474. VAERS is only for reporting reactions, and VAERS staff members do not give medical advice. 6. The National Vaccine Injury Compensation Program    The [facility] Vaccine Injury Compensation Program (VICP) is a federal program that was created to compensate people who may have been injured by certain vaccines. Claims regarding alleged injury or death due to vaccination have a time limit for filing, which may be as short as two years. Visit the VICP website at www.[facility].gov/vaccinecompensation or call 8-889.589.6694 to learn about the program and about filing a claim. 7. How can I learn more? Ask your health care provider. Call your local or state health department. Visit the website of the Food and Drug Administration (FDA) for vaccine package inserts and additional information at www.fda.gov/vaccines-blood-biologics/vaccines. Contact the Centers for Disease Control and Prevention (CDC): Call 0-681.829.2697 (1-800-CDC-INFO) or  Visit CDCs influenza website at www.cdc.gov/flu. Vaccine Information Statement   Inactivated Influenza Vaccine   8/6/2021  42 KRISTIAN Alexis [id number]   Department of Health and Human Services  Centers for Disease Control and Prevention    Office Use Only

## 2022-11-29 NOTE — PROGRESS NOTES
Patient identified by 2 identifiers. Chief Complaint   Patient presents with    Follow-up    Diabetes   1. Have you been to the ER, urgent care clinic since your last visit? Hospitalized since your last visit? No    2. Have you seen or consulted any other health care providers outside of the [address] [place] [address] since your last visit? Include any pap smears or colon screening. No  Verbal Order with Readback given by Zohreh Cook MD for Influenza. Given in left Deltoid without difficulty. Verbal Order with Readback given by Zohreh Cook MD for Pfizer COVID-19 Booster, 0.3 mL. Given in left deltoid without difficulty. Pt monitored for 15 minutes with no reaction.

## 2022-11-29 NOTE — PROGRESS NOTES
HISTORY OF PRESENT ILLNESS  Paola Panchal is a 64 y.o. female. HPI   Follow up on chronic medical problems. Overall has been feeling well. Cardiovascular Review:  The patient has hypertension and hyperlipidemia. Diet and Lifestyle: generally follows a low fat low cholesterol diet, generally follows a low sodium diet, exercises sporadically, nonsmoker  Home BP Monitoring: is not measured at home. Pertinent ROS: taking medications as instructed, no medication side effects noted, no TIA's, no chest pain on exertion, no dyspnea on exertion, no swelling of ankles, no palpitations, no muscle aches or pain. Diabetes Mellitus:  She has diabetes mellitus type 1 dx at the age of 25. She has been on insulin since her diagnoses. She has retinopathy. Recently started on ozempic to help with better BS control. She is adjusting well to her insulin pump(has the omnipod). BS had been improved and ranging in the low to mid 100s. However recently her BS readings have been much better ranging in the low to mid 100s. Weight is down by 17 pounds. .    Diabetic ROS - medication compliance: compliant most of the time, diabetic diet compliance: compliant most of the time, home glucose monitoring: is performed regularly with the [facility] system. She has  been exercising some with walking 2 to 3 times a week. Further diabetic ROS: no chest pain, dyspnea or TIA's, no numbness, has some  tingling in the extremities which has been stable. Vision has been stable. HM:  Mammo: 7/1/2022  Colonoscopy: 8/21/2019. Repeat in 10 years. Eye exam: 11/2021. Pt will schedule appt.      Patient Active Problem List   Diagnosis Code    Gastroesophageal reflux disease without esophagitis K21.9    History of hysterectomy for benign disease Z90.710    Diabetic retinopathy ([address] E11.319    Type 1 diabetes mellitus with retinopathy of right eye without macular edema ([address] E10.319    Encounter for long-term (current) use of insulin ([facility] Z79.4    Encounter for medication monitoring Z51.81    Essential hypertension I10    Mixed hyperlipidemia E78.2    Chronic constipation K59.09    Severe obesity (BMI 35.0-39. 9) with comorbidity (HCC) E66.01    Uncontrolled type 1 diabetes mellitus with retinopathy of both eyes [id number]    Type 2 diabetes with nephropathy (HCC) E11.21       Current Outpatient Medications   Medication Sig Dispense Refill    Blood-Glucose Sensor (Dexcom G6 Sensor) madyson Use to check glucose continuously connected to omnipod insulin pump 9 Each 3    blood-glucose transmitter (DEXCOM G6 TRANSMITTER) by Does Not Apply route. semaglutide (Ozempic) 0.25 mg or 0.5 mg/dose (2 mg/1.5 ml) subq pen 0.5 mg by SubCUTAneous route every seven (7) days. 2 Box 0    insulin pump cart,automated,BT (Omnipod 5 G6 Pods, Gen 5,) crtg 1 Each by SubCUTAneous route every seventy-two (72) hours. 6 Box 11    insulin pump cart,auto,BT-cntr (Omnipod 5 G6 Intro Kit, Gen 5,) crtg 1 Each by SubCUTAneous route every seventy-two (72) hours. 1 Kit 0    amLODIPine (NORVASC) 5 mg tablet TAKE 2 TABLETS BY MOUTH DAILY 180 Tablet 3    loratadine (CLARITIN) 10 mg tablet Take 1 Tablet by mouth daily as needed for Allergies. ibuprofen (MOTRIN) 800 mg tablet TAKE 1 TABLET BY MOUTH TWICE DAILY AS NEEDED FOR PAIN 60 Tablet 1    albuterol (PROVENTIL HFA, VENTOLIN HFA, PROAIR HFA) 90 mcg/actuation inhaler INHALE 2 PUFFS BY MOUTH EVERY 4 HOURS AS NEEDED FOR WHEEZING OR SHORTNESS OF BREATH 18 g 3    cyanocobalamin (VITAMIN B12) 1,000 mcg/mL injection INJECT 1 ML INTO THE MUSCLE EVERY 30 DAYS 14 mL 3    alirocumab (Praluent Pen) 75 mg/mL injector pen 1 mL by SubCUTAneous route Once every 2 weeks. 2 mL 11    lancets (Accu-Chek Fastclix Lancet Drum) misc Use to check sugar 3 times daily to calibrate with insulin pump / sensor as directed. 1 Each 11    insulin lispro (HUMALOG) 100 unit/mL injection Use with insulin pump for total daily dose of 150 units as directed.  Use 10 units with breakfast, 10 units with lunch, and 12 units with dinner for bolus doses as directed. Dispense vials please. 1 Each 0    glucose blood VI test strips (FreeStyle Precision Justice Strips) strip Use to check blood sugar with fingerstick when Saúl advises you to do so. 50 Strip 2    fexofenadine (ALLEGRA) 180 mg tablet TAKE 1 TABLET BY MOUTH DAILY AS NEEDED FOR ALLERGIES OR RHINITIS 30 Tablet 6    pantoprazole (PROTONIX) 40 mg tablet TAKE 1 TABLET BY MOUTH ONCE DAILY 30 Tablet 11    lisinopriL (PRINIVIL, ZESTRIL) 40 mg tablet Take 1 Tablet by mouth daily. 90 Tablet 3    atorvastatin (LIPITOR) 80 mg tablet TAKE 1 TABLET BY MOUTH DAILY 30 Tab 11    topiramate (TOPAMAX) 50 mg tablet TAKE 1 TABLET BY MOUTH TWICE DAILY 60 Tab 5    fluticasone propionate (FLONASE) 50 mcg/actuation nasal spray INSTILL 2 SPRAYS IN EACH NOSTRIL ONCE DAILY AS NEEDED FOR NASAL CONGESTION 1 Bottle 6    ezetimibe (ZETIA) 10 mg tablet TAKE 1 TABLET BY MOUTH DAILY 30 Tab 11    linaCLOtide (LINZESS) 290 mcg cap capsule Take 290 mcg by mouth Daily (before breakfast).          Allergies   Allergen Reactions    Lasix [Furosemide] Hives    Percocet [Oxycodone-Acetaminophen] Other (comments)     AMS           Past Medical History:   Diagnosis Date    Diabetes ([address]     Diabetic retinopathy ([address]     right eye     Gestational diabetes     Hypercholesterolemia     Hypertension     Type 1 diabetes ([address]            Past Surgical History:   Procedure Laterality Date    HX BREAST REDUCTION Bilateral     HX CATARACT REMOVAL  2018    left eye    HX  SECTION  1990    HX  SECTION  2000    HX COLONOSCOPY      HX HEENT  2016    right eye surgery- repaired blood vessel    HX HEENT  2017    left eye surgery- removal of scar tissue    HX HEENT  2018    left eye-laser    HX HEENT  10/23/2018    left eye injection    HX HEENT  2018    left eye intravitreal injection    HX HEENT  2019    left eye intravitreal injection    HX HYSTERECTOMY      partial           Family History   Problem Relation Age of Onset    Hypertension Mother     Diabetes Father     No Known Problems Brother     Suicide Brother     Asthma Daughter     Asthma Son     Psoriasis Son        Social History     Tobacco Use    Smoking status: Never    Smokeless tobacco: Never   Substance Use Topics    Alcohol use: No     Alcohol/week: 0.0 standard drinks        Lab Results   Component Value Date/Time    WBC 6.5 08/22/2022 12:07 PM    HGB 12.7 08/22/2022 12:07 PM    HCT 41.9 08/22/2022 12:07 PM    PLATELET 311 99/45/9391 12:07 PM    MCV 83.3 08/22/2022 12:07 PM     Lab Results   Component Value Date/Time    Cholesterol, total 211 (H) 08/22/2022 12:07 PM    HDL Cholesterol 73 08/22/2022 12:07 PM    LDL, calculated 114.6 (H) 08/22/2022 12:07 PM    Triglyceride 117 08/22/2022 12:07 PM    CHOL/HDL Ratio 2.9 08/22/2022 12:07 PM     Lab Results   Component Value Date/Time    TSH 1.25 08/22/2022 12:40 PM      Lab Results   Component Value Date/Time    Sodium 139 08/22/2022 12:07 PM    Potassium 4.4 08/22/2022 12:07 PM    Chloride 106 08/22/2022 12:07 PM    CO2 28 08/22/2022 12:07 PM    Anion gap 5 08/22/2022 12:07 PM    Glucose 121 (H) 08/22/2022 12:07 PM    BUN 10 08/22/2022 12:07 PM    Creatinine 1.09 (H) 08/22/2022 12:07 PM    BUN/Creatinine ratio 9 (L) 08/22/2022 12:07 PM    GFR est AA >60 08/22/2022 12:07 PM    GFR est non-AA 52 (L) 08/22/2022 12:07 PM    Calcium 9.3 08/22/2022 12:07 PM    Bilirubin, total 0.3 08/22/2022 12:07 PM    ALT (SGPT) 48 08/22/2022 12:07 PM    Alk.  phosphatase 142 (H) 08/22/2022 12:07 PM    Protein, total 7.0 08/22/2022 12:07 PM    Albumin 3.5 08/22/2022 12:07 PM    Globulin 3.5 08/22/2022 12:07 PM    A-G Ratio 1.0 (L) 08/22/2022 12:07 PM      Lab Results   Component Value Date/Time    Hemoglobin A1c 11.5 (H) 02/25/2010 08:25 AM    Hemoglobin A1c (POC) 8.7 08/22/2022 11:00 AM         Review of Systems   Constitutional:  Negative for malaise/fatigue. HENT:  Negative for congestion. Eyes:  Negative for blurred vision. Respiratory:  Negative for cough and shortness of breath. Cardiovascular:  Negative for chest pain, palpitations and leg swelling. Gastrointestinal:  Negative for abdominal pain, constipation and heartburn. Genitourinary:  Negative for dysuria, frequency and urgency. Musculoskeletal:  Negative for back pain and joint pain. Neurological:  Negative for dizziness, tingling and headaches. Endo/Heme/Allergies:  Negative for environmental allergies. Psychiatric/Behavioral:  Negative for depression. The patient does not have insomnia. Physical Exam  Vitals and nursing note reviewed. Constitutional:       Appearance: Normal appearance. She is well-developed. Comments: /70   Pulse 83   Temp 97.7 °F (36.5 °C) (Oral)   Resp 17   Ht 5' 2\" (1.575 m)   Wt 210 lb 9.6 oz (95.5 kg)   LMP  (LMP Unknown)   SpO2 97%   BMI 38.52 kg/m²    HENT:      Right Ear: Tympanic membrane and ear canal normal.      Left Ear: Tympanic membrane and ear canal normal.   Neck:      Thyroid: No thyromegaly. Cardiovascular:      Rate and Rhythm: Normal rate and regular rhythm. Heart sounds: Normal heart sounds. Pulmonary:      Effort: Pulmonary effort is normal.      Breath sounds: Normal breath sounds. Abdominal:      General: Bowel sounds are normal.      Palpations: Abdomen is soft. There is no mass. Tenderness: There is no abdominal tenderness. Musculoskeletal:         General: Normal range of motion. Cervical back: Normal range of motion and neck supple. Right lower leg: No edema. Left lower leg: No edema. Lymphadenopathy:      Cervical: No cervical adenopathy. Skin:     General: Skin is warm and dry. Neurological:      General: No focal deficit present. Mental Status: She is alert and oriented to person, place, and time.    Psychiatric:         Mood and Affect: Mood normal. ASSESSMENT and PLAN  Diagnoses and all orders for this visit:    1. Type 1 diabetes mellitus with retinopathy of both eyes, macular edema presence unspecified, unspecified retinopathy severity (HCC)  A1c improved to 7.6%. Continue to monitor. Work on diet and exercise. -     AMB POC HEMOGLOBIN A1C  -     AMB POC GLUCOSE, QUANTITATIVE, BLOOD  -     semaglutide (Ozempic) 0.25 mg or 0.5 mg/dose (2 mg/1.5 ml) subq pen; 0.5 mg by SubCUTAneous route every seven (7) days. 2. Essential hypertension  Stable     3. Mixed hyperlipidemia  Continue to monitor. Work on diet and exercise.  -     LIPID PANEL; Future    4. Encounter for long-term (current) use of insulin ([facility]  5. Encounter for medication monitoring  -     METABOLIC PANEL, BASIC; Future    6. Non morbid obesity  I have reviewed/discussed the above normal BMI with the patient. I have recommended the following interventions: dietary management education, guidance, and counseling     Follow-up and Dispositions    Return in about 3 months (around 2/28/2023). reviewed diet, exercise and weight control  cardiovascular risk and specific lipid/LDL goals reviewed  reviewed medications and side effects in detail  specific diabetic recommendations: low cholesterol diet, weight control and daily exercise discussed, all medications, side effects and compliance discussed carefully, foot care discussed and Podiatry visits discussed, annual eye examinations at Ophthalmology discussed, and glycohemoglobin and other lab monitoring discussed      I have discussed diagnosis listed in this note with pt and/or family. I have discussed treatment plans and options and the risk/benefit analysis of those options, including safe use of medications and possible medication side effects. Through the use of shared decision making we have agreed to the above plan. The patient has received an after-visit summary and questions were answered concerning future plans and follow up. Advise pt of any urgent changes then to proceed to the ER.

## 2022-11-30 NOTE — TELEPHONE ENCOUNTER
Pt contacted me regarding ozempic sample   Had a PCP appt  Was able to provide her ozempic sample to continue therapy. When Mitcheal Brooke is available plan to make switch.    Dawit Trotter, PHARMD, [address] [place] [address] in place: Yes  Recommendation Provided To: Patient/Caregiver: 2 via Telephone  Intervention Detail: Patient Access Assistance/Sample Provided  Intervention Accepted By: Patient/Caregiver: 2  Time Spent (min): 15

## 2022-12-21 ENCOUNTER — TELEPHONE (OUTPATIENT)
Dept: FAMILY MEDICINE CLINIC | Age: 57
End: 2022-12-21

## 2022-12-21 NOTE — TELEPHONE ENCOUNTER
----- Message from Terrie Hull sent at 12/20/2022  1:02 PM EST -----  Regarding: Zuleyka Gomez Everyone   I spoke with Hythiam. who is out for a few weeks. Was requested to send this message. I am out of the above medication effective this Friday. Requesting to  if its available before Friday. Please let me know. Thanks   Tessy     semaglutide (Ozempic) 0.25 mg or 0.5 mg/dose (2 mg/1.5 ml) subq pen    ---------------------------------------------------------------------------  Orion Lee,  Can you get this for the Ms. Didi Herndon please? I can call her whenever it's ready for pick it up. It looks like Hythiam. is virtual today.   Thank you

## 2022-12-27 ENCOUNTER — DOCUMENTATION ONLY (OUTPATIENT)
Dept: FAMILY MEDICINE CLINIC | Age: 57
End: 2022-12-27

## 2022-12-27 RX ORDER — SEMAGLUTIDE 1.34 MG/ML
0.5 INJECTION, SOLUTION SUBCUTANEOUS
Qty: 1 BOX | Refills: 0 | Status: SHIPPED | COMMUNITY
Start: 2022-12-27

## 2023-01-04 RX ORDER — IBUPROFEN 800 MG/1
TABLET ORAL
Qty: 60 TABLET | Refills: 1 | Status: SHIPPED | OUTPATIENT
Start: 2023-01-04

## 2023-01-18 ENCOUNTER — VIRTUAL VISIT (OUTPATIENT)
Dept: FAMILY MEDICINE CLINIC | Age: 58
End: 2023-01-18

## 2023-01-18 DIAGNOSIS — E10.319 TYPE 1 DIABETES MELLITUS WITH RETINOPATHY OF BOTH EYES, MACULAR EDEMA PRESENCE UNSPECIFIED, UNSPECIFIED RETINOPATHY SEVERITY (HCC): Primary | ICD-10-CM

## 2023-01-18 NOTE — PROGRESS NOTES
Called pt for a virtual phone visit to follow up on DM1,   Using Omnipod now  Sending her pods  Only sent one    Omnipod system   Current Settings: Total Basal: 36.5 units / day  12 AM - 7 AM: 1.4 units / hour  7 AM - 12 PM: 1.5 units / hour  12 PM - 12 AM: 1.6 units / hour    Control looks improved looking at dexcom clarity data (see below)    Will contact clinical contact for omnipod regarding pod samples ? And logging in to see pt's data with the pod to consider adjustments if indicated. Regarding ozempic   Has lost weight - notices inches doesn't weight    Will provide sample pen for  this week     Patient verbalized understanding of information presented. Answered all of the patient's questions.      Juanito Lopes, PHARMD, [address]    Program: Medical Group  CPA in place: Yes  Recommendation Provided To: Patient/Caregiver: 3 via Virtual Visit  Intervention Detail: Device Training, Patient Access Assistance/Sample Provided, and Scheduled Appointment  Intervention Accepted By: Patient/Caregiver: 3  Time Spent (min): 60

## 2023-01-19 ENCOUNTER — VIRTUAL VISIT (OUTPATIENT)
Dept: FAMILY MEDICINE CLINIC | Age: 58
End: 2023-01-19

## 2023-01-19 DIAGNOSIS — E10.319 TYPE 1 DIABETES MELLITUS WITH RETINOPATHY OF BOTH EYES, MACULAR EDEMA PRESENCE UNSPECIFIED, UNSPECIFIED RETINOPATHY SEVERITY (HCC): Primary | ICD-10-CM

## 2023-01-19 DIAGNOSIS — E66.9 NON MORBID OBESITY: ICD-10-CM

## 2023-01-19 RX ORDER — SEMAGLUTIDE 1.34 MG/ML
0.5 INJECTION, SOLUTION SUBCUTANEOUS
Qty: 1 BOX | Refills: 0 | Status: SHIPPED | COMMUNITY
Start: 2023-01-19

## 2023-01-19 NOTE — PROGRESS NOTES
Contacting patient for a virtual phone call visit to follow up on ozempic  Received samples yesterday of her 0.5 mg every 7 days dose of ozempic  Reviewed with patient  Samples logged, documented and signed out per policy. Will be in med room fridge for , pt plans to  on Friday 1/20  Have not yet heard back from omnipod rep; will contact patient when I do to see about sample pods and follow up regarding any changes to her regimen with the pump. Has follow up with PCP on 2/28/23    Patient verbalized understanding of information presented.       Jessica Stoddard, PHARMD, [facility]    Program: Medical Group  CPA in place: Yes  Recommendation Provided To: Patient/Caregiver: 4 via Telephone  Intervention Detail: Discontinued Rx: 2, reason: Duplicate Therapy, Patient Access Assistance/Sample Provided, and Refill(s) Provided  Intervention Accepted By: Patient/Caregiver: 4  Time Spent (min): 20

## 2023-01-22 NOTE — MR AVS SNAPSHOT
Visit Information Date & Time Provider Department Dept. Phone Encounter #  
 6/9/2017 10:15 AM Faby Fung MD [facility] [phone] [id number] Follow-up Instructions Return in about 4 months (around 10/9/2017) for physical.  
  
Your Appointments 8/16/2017  2:00 PM  
ROUTINE CARE with Faby Fung MD  
[facility] Appt Note: 3m f/u  
 [address] [facility] [place]  [id number] [phone] [address] Upcoming Health Maintenance Date Due  
 FOOT EXAM Q1 12/5/1975 DTaP/Tdap/Td series (1 - Tdap) 5/5/2013 FOBT Q 1 YEAR AGE 50-75 12/5/2015 INFLUENZA AGE 9 TO ADULT 8/1/2017 HEMOGLOBIN A1C Q6M 11/16/2017 EYE EXAM RETINAL OR DILATED Q1 2/7/2018 MICROALBUMIN Q1 5/16/2018 LIPID PANEL Q1 5/16/2018 PAP AKA CERVICAL CYTOLOGY 10/12/2018 BREAST CANCER SCRN MAMMOGRAM 6/1/2019 Allergies as of 6/9/2017  Review Complete On: 6/9/2017 By: Faby Fung MD  
  
 Severity Noted Reaction Type Reactions Percocet [Oxycodone-acetaminophen]  05/04/2013    Other (comments) AMS Current Immunizations  Reviewed on 6/9/2017 Name Date Pneumococcal Polysaccharide (PPSV-23) 5/16/2017 Td, Adsorbed PF 5/4/2013  7:32 PM  
  
 Reviewed by Faby Fung MD on 6/9/2017 at 11:01 AM  
Vitals BP Pulse Temp Resp Height(growth percentile) Weight(growth percentile) 137/83 (BP 1 Location: Right arm, BP Patient Position: Sitting) 91 98.6 °F (37 °C) (Oral) 16 5' 1\" (1.549 m) 192 lb (87.1 kg) LMP SpO2 BMI OB Status Smoking Status 02/01/2001 99% 36.28 kg/m2 Hysterectomy Never Smoker Vitals History BMI and BSA Data Body Mass Index Body Surface Area  
 36.28 kg/m 2 1.94 m 2 Preferred Pharmacy Pharmacy Name Phone [facility] [address], [address] [phone] Your Updated Medication List  
  
   
This list is accurate as of: 6/9/17 11:06 AM.  Always use your most recent med list.  
  
  
  
  
 atorvastatin 40 mg tablet Commonly known as:  LIPITOR Take 1 Tab by mouth daily. cyanocobalamin 1,000 mcg/mL injection Commonly known as:  VITAMIN B12  
1,000 mcg by IntraMUSCular route every thirty (30) days. ibuprofen 800 mg tablet Commonly known as:  MOTRIN Take 1 Tab by mouth two (2) times daily as needed for Pain. insulin glargine 100 unit/mL (3 mL) Inpn Commonly known as:  LANTUS,BASAGLAR  
70 units twice daily  
  
 insulin lispro 100 unit/mL kwikpen Commonly known as:  HUMALOG Sliding Scale for Blood Sugar above 300  
  
 lisinopril 20 mg tablet Commonly known as:  Sheng Moulder Take 1 Tab by mouth daily. Swapna Pen Needle 32 gauge x 5/32\" Ndle Generic drug:  Insulin Needles (Disposable) ONETOUCH ULTRA TEST strip Generic drug:  glucose blood VI test strips Use to check BS 3-4 times daily  
  
 pantoprazole 40 mg tablet Commonly known as:  PROTONIX Take 1 Tab by mouth daily. Follow-up Instructions Return in about 4 months (around 10/9/2017) for physical.  
  
  
Introducing [facility]! Dear Yaritza Delay: Thank you for requesting a 1calendar account. Our records indicate that you already have an active 1calendar account. You can access your account anytime at [email or web address] Did you know that you can access your hospital and ER discharge instructions at any time in 1calendar? You can also review all of your test results from your hospital stay or ER visit. Additional Information If you have questions, please visit the Frequently Asked Questions section of the 1calendar website at [email or web address] Remember, [employer] is NOT to be used for urgent needs. For medical emergencies, dial 911. Now available from your iPhone and Android! Please provide this summary of care documentation to your next provider. Your primary care clinician is listed as Phys Other. If you have any questions after today's visit, please call [phone]. 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 Patient states she feels fine now but this morning when she was having an endoscopy she was increasingly short of breath and was sent to the ED.     ED Course  Vitals: /66 P 94 R 30 T 97.8 F SPo2 86% RA  Meds: heparin, duonebs, dexamethasone IV 6 mg  s/p BiPAP for hypoxia  critical care consult: recommend telemetry admission, heparin drip, TTE, serial EKG/trop, remdesivir, decadron      INTERVAL HPI/OVERNIGHT EVENTS:  T(C): 36.8 (01-22-23 @ 08:02), Max: 37.1 (01-21-23 @ 11:31)  HR: 73 (01-22-23 @ 08:02) (72 - 94)  BP: 145/57 (01-22-23 @ 08:02) (122/59 - 156/66)  RR: 18 (01-22-23 @ 08:02) (18 - 30)  SpO2: 95% (01-22-23 @ 08:02) (86% - 100%)  Wt(kg): --  I&O's Summary      PAST MEDICAL & SURGICAL HISTORY:  HTN (hypertension)      DM (diabetes mellitus)      Hypercholesteremia      Gastric adenocarcinoma  s/p resection      Vertigo      Dementia      S/P hysterectomy      S/P tubal ligation          SOCIAL HISTORY  Alcohol:  Tobacco:  Illicit substance use:      FAMILY HISTORY:      LABS:                        10.8   3.66  )-----------( 182      ( 22 Jan 2023 06:15 )             32.0     01-22    142  |  110<H>  |  23<H>  ----------------------------<  152<H>  4.2   |  18<L>  |  0.80    Ca    9.3      22 Jan 2023 06:15  Mg     1.7     01-21    TPro  6.6  /  Alb  3.1<L>  /  TBili  0.3  /  DBili  x   /  AST  11  /  ALT  20  /  AlkPhos  49  01-21    PT/INR - ( 21 Jan 2023 11:30 )   PT: 13.1 sec;   INR: 1.10 ratio         PTT - ( 22 Jan 2023 04:00 )  PTT:50.6 sec    CAPILLARY BLOOD GLUCOSE      POCT Blood Glucose.: 170 mg/dL (22 Jan 2023 07:56)  POCT Blood Glucose.: 160 mg/dL (21 Jan 2023 22:33)    ABG - ( 21 Jan 2023 11:53 )  pH, Arterial: 7.45  pH, Blood: x     /  pCO2: 24    /  pO2: 315   / HCO3: 17    / Base Excess: -5.5  /  SaO2: 98                      MEDICATIONS  (STANDING):  dexAMETHasone  Injectable 6 milliGRAM(s) IV Push daily  heparin  Infusion.  Unit(s)/Hr (9 mL/Hr) IV Continuous <Continuous>  influenza  Vaccine (HIGH DOSE) 0.7 milliLiter(s) IntraMuscular once  insulin lispro (ADMELOG) corrective regimen sliding scale   SubCutaneous three times a day before meals  insulin lispro (ADMELOG) corrective regimen sliding scale   SubCutaneous at bedtime  remdesivir  IVPB 100 milliGRAM(s) IV Intermittent every 24 hours  remdesivir  IVPB   IV Intermittent     MEDICATIONS  (PRN):  acetaminophen     Tablet .. 650 milliGRAM(s) Oral every 6 hours PRN Temp greater or equal to 38C (100.4F), Mild Pain (1 - 3)      REVIEW OF SYSTEMS:  CONSTITUTIONAL: No fever, weight loss, or fatigue  EYES: No eye pain, visual disturbances, or discharge  ENMT:  No difficulty hearing, tinnitus, vertigo; No sinus or throat pain  NECK: No pain or stiffness  RESPIRATORY: No cough, wheezing, chills or hemoptysis; No shortness of breath  CARDIOVASCULAR: No chest pain, palpitations, dizziness, or leg swelling  GASTROINTESTINAL: No abdominal or epigastric pain. No nausea, vomiting, or hematemesis; No diarrhea or constipation. No melena or hematochezia.  GENITOURINARY: No dysuria, frequency, hematuria, or incontinence  NEUROLOGICAL: No headaches, memory loss, loss of strength, numbness, or tremors  SKIN: No itching, burning, rashes, or lesions   LYMPH NODES: No enlarged glands  ENDOCRINE: No heat or cold intolerance; No hair loss  MUSCULOSKELETAL: No joint pain or swelling; No muscle, back, or extremity pain  PSYCHIATRIC: No depression, anxiety, mood swings, or difficulty sleeping  HEME/LYMPH: No easy bruising, or bleeding gums  ALLERY AND IMMUNOLOGIC: No hives or eczema    PHYSICAL EXAM:  GENERAL: NAD, well-groomed, well-developed  HEAD:  Atraumatic, Normocephalic  EYES: EOMI, PERRLA, conjunctiva and sclera clear  ENMT: No tonsillar erythema, exudates, or enlargement; Moist mucous membranes, Good dentition, No lesions  NECK: Supple, No JVD, Normal thyroid  NERVOUS SYSTEM:  Alert & Oriented X3, Good concentration; Motor Strength 5/5 B/L upper and lower extremities; DTRs 2+ intact and symmetric  CHEST/LUNG: Clear to percussion bilaterally; No rales, rhonchi, wheezing, or rubs  HEART: Regular rate and rhythm; No murmurs, rubs, or gallops  ABDOMEN: Soft, Nontender, Nondistended; Bowel sounds present  EXTREMITIES:  2+ Peripheral Pulses, No clubbing, cyanosis, or edema  LYMPH: No lymphadenopathy noted  SKIN: No rashes or lesions    RADIOLOGY & ADDITIONAL TESTS:    Imaging Personally Reviewed:  [ ] YES  [ ] NO    Consultant(s) Notes Reviewed:  [ ] YES  [ ] NO        Care Discussed with Consultants/Other Providers [ ] YES  [ ] NO Patient is a 86y old  Female who presents with a chief complaint of shortness of breath (22 Jan 2023 07:47)  85 yo Female, from home, lives with daughter, uses a walker, with medical history significant for HTN, HLD, DM, Osteoporosis, Stage II B Gastric Adenocarcinoma s/p distal gastrectomy in 2015 on active chemo (follows [facility] Dr Adams), Early Multiple myeloma, surgical history of ASHU w/BSO, Cholecystectomy, presenting to the ED with shortness of breath with cough and productive sputum for 2 days as well as chest tightness. Patient states she feels fine now but this morning when she was having an endoscopy she was increasingly short of breath and was sent to the ED.     History of Present Illness:     as above, Patient is awake, alert, laying comfortably in the bed in NAD. Isolation precaution due to COVID +.    ED Course  Vitals: /66 P 94 R 30 T 97.8 F SPo2 86% RA  Meds: heparin, duonebs, dexamethasone IV 6 mg  s/p BiPAP for hypoxia  critical care consult: recommend telemetry admission, heparin drip, TTE, serial EKG/trop, remdesivir, decadron      INTERVAL HPI/OVERNIGHT EVENTS:  T(C): 36.8 (01-22-23 @ 08:02), Max: 37.1 (01-21-23 @ 11:31)  HR: 73 (01-22-23 @ 08:02) (72 - 94)  BP: 145/57 (01-22-23 @ 08:02) (122/59 - 156/66)  RR: 18 (01-22-23 @ 08:02) (18 - 30)  SpO2: 95% (01-22-23 @ 08:02) (86% - 100%)  Wt(kg): --  I&O's Summary      PAST MEDICAL & SURGICAL HISTORY:  HTN (hypertension)      DM (diabetes mellitus)      Hypercholesteremia      Gastric adenocarcinoma  s/p resection      Vertigo      Dementia      S/P hysterectomy      S/P tubal ligation          SOCIAL HISTORY  Alcohol:  Tobacco:  Illicit substance use:      FAMILY HISTORY:      LABS:                        10.8   3.66  )-----------( 182      ( 22 Jan 2023 06:15 )             32.0     01-22    142  |  110<H>  |  23<H>  ----------------------------<  152<H>  4.2   |  18<L>  |  0.80    Ca    9.3      22 Jan 2023 06:15  Mg     1.7     01-21    TPro  6.6  /  Alb  3.1<L>  /  TBili  0.3  /  DBili  x   /  AST  11  /  ALT  20  /  AlkPhos  49  01-21    PT/INR - ( 21 Jan 2023 11:30 )   PT: 13.1 sec;   INR: 1.10 ratio         PTT - ( 22 Jan 2023 04:00 )  PTT:50.6 sec    CAPILLARY BLOOD GLUCOSE      POCT Blood Glucose.: 170 mg/dL (22 Jan 2023 07:56)  POCT Blood Glucose.: 160 mg/dL (21 Jan 2023 22:33)    ABG - ( 21 Jan 2023 11:53 )  pH, Arterial: 7.45  pH, Blood: x     /  pCO2: 24    /  pO2: 315   / HCO3: 17    / Base Excess: -5.5  /  SaO2: 98                      MEDICATIONS  (STANDING):  dexAMETHasone  Injectable 6 milliGRAM(s) IV Push daily  heparin  Infusion.  Unit(s)/Hr (9 mL/Hr) IV Continuous <Continuous>  influenza  Vaccine (HIGH DOSE) 0.7 milliLiter(s) IntraMuscular once  insulin lispro (ADMELOG) corrective regimen sliding scale   SubCutaneous three times a day before meals  insulin lispro (ADMELOG) corrective regimen sliding scale   SubCutaneous at bedtime  remdesivir  IVPB 100 milliGRAM(s) IV Intermittent every 24 hours  remdesivir  IVPB   IV Intermittent     MEDICATIONS  (PRN):  acetaminophen     Tablet .. 650 milliGRAM(s) Oral every 6 hours PRN Temp greater or equal to 38C (100.4F), Mild Pain (1 - 3)      REVIEW OF SYSTEMS:  CONSTITUTIONAL: No fever, weight loss, or fatigue  EYES: No eye pain, visual disturbances, or discharge  ENMT:  No difficulty hearing, tinnitus, vertigo; No sinus or throat pain  NECK: No pain or stiffness  RESPIRATORY: No cough, wheezing, chills or hemoptysis; No shortness of breath  CARDIOVASCULAR: No chest pain, palpitations, dizziness, or leg swelling  GASTROINTESTINAL: No abdominal or epigastric pain. No nausea, vomiting, or hematemesis; No diarrhea or constipation. No melena or hematochezia.  GENITOURINARY: No dysuria, frequency, hematuria, or incontinence  NEUROLOGICAL: No headaches, memory loss, loss of strength, numbness, or tremors  SKIN: No itching, burning, rashes, or lesions   LYMPH NODES: No enlarged glands  ENDOCRINE: No heat or cold intolerance; No hair loss  MUSCULOSKELETAL: No joint pain or swelling; No muscle, back, or extremity pain  PSYCHIATRIC: No depression, anxiety, mood swings, or difficulty sleeping  HEME/LYMPH: No easy bruising, or bleeding gums  ALLERY AND IMMUNOLOGIC: No hives or eczema    PHYSICAL EXAM:  GENERAL: NAD, well-groomed, well-developed  HEAD:  Atraumatic, Normocephalic  EYES: EOMI, PERRLA, conjunctiva and sclera clear  ENMT: No tonsillar erythema, exudates, or enlargement; Moist mucous membranes, Good dentition, No lesions  NECK: Supple, No JVD, Normal thyroid  NERVOUS SYSTEM:  Alert & Oriented X3, Good concentration; Motor Strength 5/5 B/L upper and lower extremities; DTRs 2+ intact and symmetric  CHEST/LUNG: Clear to percussion bilaterally; No rales, rhonchi, wheezing, or rubs  HEART: Regular rate and rhythm; No murmurs, rubs, or gallops  ABDOMEN: Soft, Nontender, Nondistended; Bowel sounds present  EXTREMITIES:  2+ Peripheral Pulses, No clubbing, cyanosis, or edema  LYMPH: No lymphadenopathy noted  SKIN: No rashes or lesions    RADIOLOGY & ADDITIONAL TESTS:  < from: Xray Chest 1 View- PORTABLE-Urgent (01.21.23 @ 13:16) >    IMPRESSION:  No acute pulmonary pathology.    --- End of Report ---      < end of copied text >      < from: CT Angio Chest PE Protocol w/ IV Cont (01.21.23 @ 13:02) >    1.  Bilateral pulmonary emboli.  2.  Likely underlying right heart strain.  3.  The findings were discussed with and read back verification obtained   from Dr. Calvillo on 1/21/2023 at 1315 hours.    --- End of Report ---    < end of copied text >    Imaging Personally Reviewed:  [ ] YES  [ ] NO    Consultant(s) Notes Reviewed:  [ ] YES  [ ] NO        Care Discussed with Consultants/Other Providers [ ] YES  [ ] NO Patient is a 86y old  Female who presents with a chief complaint of shortness of breath (22 Jan 2023 07:47)  87 yo Female, from home, lives with daughter, uses a walker, with medical history significant for HTN, HLD, DM, Osteoporosis, Stage II B Gastric Adenocarcinoma s/p distal gastrectomy in 2015 on active chemo (follows [facility] Dr Adams), Early Multiple myeloma, surgical history of ASHU w/BSO, Cholecystectomy, presenting to the ED with shortness of breath with cough and productive sputum for 2 days as well as chest tightness. Patient states she feels fine now but this morning when she was having an endoscopy she was increasingly short of breath and was sent to the ED.     History of Present Illness:     As above, Patient is awake, alert, laying comfortably in the bed in NAD. Isolation precaution due to COVID +.    ED Course  Vitals: /66 P 94 R 30 T 97.8 F SPo2 86% RA  Meds: heparin, duonebs, dexamethasone IV 6 mg  s/p BiPAP for hypoxia  critical care consult: recommend telemetry admission, heparin drip, TTE, serial EKG/trop, remdesivir, decadron      INTERVAL HPI/OVERNIGHT EVENTS:  T(C): 36.8 (01-22-23 @ 08:02), Max: 37.1 (01-21-23 @ 11:31)  HR: 73 (01-22-23 @ 08:02) (72 - 94)  BP: 145/57 (01-22-23 @ 08:02) (122/59 - 156/66)  RR: 18 (01-22-23 @ 08:02) (18 - 30)  SpO2: 95% (01-22-23 @ 08:02) (86% - 100%)  Wt(kg): --  I&O's Summary      PAST MEDICAL & SURGICAL HISTORY:  HTN (hypertension)      DM (diabetes mellitus)      Hypercholesteremia      Gastric adenocarcinoma  s/p resection      Vertigo      Dementia      S/P hysterectomy      S/P tubal ligation          SOCIAL HISTORY  Alcohol:  Tobacco:  Illicit substance use:      FAMILY HISTORY:      LABS:                        10.8   3.66  )-----------( 182      ( 22 Jan 2023 06:15 )             32.0     01-22    142  |  110<H>  |  23<H>  ----------------------------<  152<H>  4.2   |  18<L>  |  0.80    Ca    9.3      22 Jan 2023 06:15  Mg     1.7     01-21    TPro  6.6  /  Alb  3.1<L>  /  TBili  0.3  /  DBili  x   /  AST  11  /  ALT  20  /  AlkPhos  49  01-21    PT/INR - ( 21 Jan 2023 11:30 )   PT: 13.1 sec;   INR: 1.10 ratio         PTT - ( 22 Jan 2023 04:00 )  PTT:50.6 sec    CAPILLARY BLOOD GLUCOSE      POCT Blood Glucose.: 170 mg/dL (22 Jan 2023 07:56)  POCT Blood Glucose.: 160 mg/dL (21 Jan 2023 22:33)    ABG - ( 21 Jan 2023 11:53 )  pH, Arterial: 7.45  pH, Blood: x     /  pCO2: 24    /  pO2: 315   / HCO3: 17    / Base Excess: -5.5  /  SaO2: 98          Respiratory Viral Panel with COVID-19 by ALEX (01.21.23 @ 11:25)   Rapid RVP Result: Detected             MEDICATIONS  (STANDING):  dexAMETHasone  Injectable 6 milliGRAM(s) IV Push daily  heparin  Infusion.  Unit(s)/Hr (9 mL/Hr) IV Continuous <Continuous>  influenza  Vaccine (HIGH DOSE) 0.7 milliLiter(s) IntraMuscular once  insulin lispro (ADMELOG) corrective regimen sliding scale   SubCutaneous three times a day before meals  insulin lispro (ADMELOG) corrective regimen sliding scale   SubCutaneous at bedtime  remdesivir  IVPB 100 milliGRAM(s) IV Intermittent every 24 hours  remdesivir  IVPB   IV Intermittent     MEDICATIONS  (PRN):  acetaminophen     Tablet .. 650 milliGRAM(s) Oral every 6 hours PRN Temp greater or equal to 38C (100.4F), Mild Pain (1 - 3)      REVIEW OF SYSTEMS:  CONSTITUTIONAL: No fever, weight loss, or fatigue  EYES: No eye pain, visual disturbances, or discharge  ENMT:  No difficulty hearing, tinnitus, vertigo; No sinus or throat pain  NECK: No pain or stiffness  RESPIRATORY: No cough, wheezing, chills or hemoptysis; No shortness of breath  CARDIOVASCULAR: No chest pain, palpitations, dizziness, or leg swelling  GASTROINTESTINAL: No abdominal or epigastric pain. No nausea, vomiting, or hematemesis; No diarrhea or constipation. No melena or hematochezia.  GENITOURINARY: No dysuria, frequency, hematuria, or incontinence  NEUROLOGICAL: No headaches, memory loss, loss of strength, numbness, or tremors  SKIN: No itching, burning, rashes, or lesions   LYMPH NODES: No enlarged glands  ENDOCRINE: No heat or cold intolerance; No hair loss  MUSCULOSKELETAL: No joint pain or swelling; No muscle, back, or extremity pain  PSYCHIATRIC: No depression, anxiety, mood swings, or difficulty sleeping  HEME/LYMPH: No easy bruising, or bleeding gums  ALLERY AND IMMUNOLOGIC: No hives or eczema    PHYSICAL EXAM:  GENERAL: NAD, well-groomed, well-developed  HEAD:  Atraumatic, Normocephalic  EYES: EOMI, PERRLA, conjunctiva and sclera clear  ENMT: No tonsillar erythema, exudates, or enlargement; Moist mucous membranes, Good dentition, No lesions  NECK: Supple, No JVD, Normal thyroid  NERVOUS SYSTEM:  Alert & Oriented X3, Good concentration; Motor Strength 5/5 B/L upper and lower extremities; DTRs 2+ intact and symmetric  CHEST/LUNG: Clear to percussion bilaterally; No rales, rhonchi, wheezing, or rubs  HEART: Regular rate and rhythm; No murmurs, rubs, or gallops  ABDOMEN: Soft, Nontender, Nondistended; Bowel sounds present  EXTREMITIES:  2+ Peripheral Pulses, No clubbing, cyanosis, or edema  LYMPH: No lymphadenopathy noted  SKIN: No rashes or lesions    RADIOLOGY & ADDITIONAL TESTS:  < from: Xray Chest 1 View- PORTABLE-Urgent (01.21.23 @ 13:16) >    IMPRESSION:  No acute pulmonary pathology.    --- End of Report ---      < end of copied text >      < from: CT Angio Chest PE Protocol w/ IV Cont (01.21.23 @ 13:02) >    1.  Bilateral pulmonary emboli.  2.  Likely underlying right heart strain.  3.  The findings were discussed with and read back verification obtained   from Dr. Calvillo on 1/21/2023 at 1315 hours.    --- End of Report ---    < end of copied text >    Imaging Personally Reviewed:  [ x] YES  [ ] NO    Consultant(s) Notes Reviewed:  [x ] YES  [ ] NO        Care Discussed with Consultants/Other Providers [x] YES  [ ] NO

## 2023-02-06 DIAGNOSIS — J20.9 BRONCHITIS WITH BRONCHOSPASM: ICD-10-CM

## 2023-02-06 RX ORDER — ALIROCUMAB 75 MG/ML
INJECTION, SOLUTION SUBCUTANEOUS
Qty: 2 ML | Refills: 11 | Status: SHIPPED | OUTPATIENT
Start: 2023-02-06

## 2023-02-06 RX ORDER — ALBUTEROL SULFATE 90 UG/1
AEROSOL, METERED RESPIRATORY (INHALATION)
Qty: 6.7 G | Refills: 3 | Status: SHIPPED | OUTPATIENT
Start: 2023-02-06

## 2023-02-08 ENCOUNTER — DOCUMENTATION ONLY (OUTPATIENT)
Dept: FAMILY MEDICINE CLINIC | Age: 58
End: 2023-02-08

## 2023-02-14 ENCOUNTER — VIRTUAL VISIT (OUTPATIENT)
Dept: FAMILY MEDICINE CLINIC | Age: 58
End: 2023-02-14

## 2023-02-14 DIAGNOSIS — E10.319 TYPE 1 DIABETES MELLITUS WITH RETINOPATHY OF BOTH EYES, MACULAR EDEMA PRESENCE UNSPECIFIED, UNSPECIFIED RETINOPATHY SEVERITY (HCC): Primary | ICD-10-CM

## 2023-02-14 DIAGNOSIS — E66.9 NON MORBID OBESITY: ICD-10-CM

## 2023-02-14 RX ORDER — SEMAGLUTIDE 0.5 MG/.5ML
0.5 INJECTION, SOLUTION SUBCUTANEOUS
Qty: 4 EACH | Refills: 2 | Status: SHIPPED | OUTPATIENT
Start: 2023-02-14

## 2023-02-14 NOTE — PROGRESS NOTES
Pt was prescribed wegovy several months ago by PCP but not available anywhere; have been told it's back in stock   She's been taking ozempic samples but those are hard to come by at this time  Contacted pt to discuss  [facility] order for [facility]   Provided webiste / information for copay reducing card  May need to do a PA   (? 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   Long Camara, PHARMD, [facility]    Addendum:   Pt informed me that pharmacy does not have wegovy; is on back order; no date yet; will contact me when it comes in     ANGELY Green, [address] [facility]    Program: Medical Group  CPA in place: Yes  Recommendation Provided To: Patient/Caregiver: 1 via Virtual Visit and Pharmacy: 1  Intervention Detail: New Rx: 1, reason: Cost/Formulary Change and Patient Access Assistance/Sample Provided  Intervention Accepted By: Patient/Caregiver: 1 and Pharmacy: 1  Time Spent (min): 30

## 2023-02-28 ENCOUNTER — OFFICE VISIT (OUTPATIENT)
Dept: FAMILY MEDICINE CLINIC | Age: 58
End: 2023-02-28
Payer: COMMERCIAL

## 2023-02-28 VITALS
OXYGEN SATURATION: 100 % | TEMPERATURE: 97.6 F | DIASTOLIC BLOOD PRESSURE: 73 MMHG | WEIGHT: 178.4 LBS | BODY MASS INDEX: 32.83 KG/M2 | HEART RATE: 82 BPM | RESPIRATION RATE: 13 BRPM | SYSTOLIC BLOOD PRESSURE: 134 MMHG | HEIGHT: 62 IN

## 2023-02-28 DIAGNOSIS — K21.9 GASTROESOPHAGEAL REFLUX DISEASE WITHOUT ESOPHAGITIS: ICD-10-CM

## 2023-02-28 DIAGNOSIS — M70.62 GREATER TROCHANTERIC BURSITIS OF LEFT HIP: ICD-10-CM

## 2023-02-28 DIAGNOSIS — Z91.09 ENVIRONMENTAL ALLERGIES: ICD-10-CM

## 2023-02-28 DIAGNOSIS — I10 ESSENTIAL HYPERTENSION: ICD-10-CM

## 2023-02-28 DIAGNOSIS — Z51.81 ENCOUNTER FOR MEDICATION MONITORING: ICD-10-CM

## 2023-02-28 DIAGNOSIS — E66.9 NON MORBID OBESITY: ICD-10-CM

## 2023-02-28 DIAGNOSIS — Z12.31 ENCOUNTER FOR SCREENING MAMMOGRAM FOR BREAST CANCER: ICD-10-CM

## 2023-02-28 DIAGNOSIS — E10.319 TYPE 1 DIABETES MELLITUS WITH RETINOPATHY OF BOTH EYES, MACULAR EDEMA PRESENCE UNSPECIFIED, UNSPECIFIED RETINOPATHY SEVERITY (HCC): Primary | ICD-10-CM

## 2023-02-28 DIAGNOSIS — E78.2 MIXED HYPERLIPIDEMIA: ICD-10-CM

## 2023-02-28 DIAGNOSIS — E53.8 B12 DEFICIENCY: ICD-10-CM

## 2023-02-28 LAB
GLUCOSE POC: 161 MG/DL
HBA1C MFR BLD HPLC: 7.1 %

## 2023-02-28 PROCEDURE — 3075F SYST BP GE 130 - 139MM HG: CPT | Performed by: FAMILY MEDICINE

## 2023-02-28 PROCEDURE — 83036 HEMOGLOBIN GLYCOSYLATED A1C: CPT | Performed by: FAMILY MEDICINE

## 2023-02-28 PROCEDURE — 82947 ASSAY GLUCOSE BLOOD QUANT: CPT | Performed by: FAMILY MEDICINE

## 2023-02-28 PROCEDURE — 3078F DIAST BP <80 MM HG: CPT | Performed by: FAMILY MEDICINE

## 2023-02-28 PROCEDURE — 99214 OFFICE O/P EST MOD 30 MIN: CPT | Performed by: FAMILY MEDICINE

## 2023-02-28 PROCEDURE — 3051F HG A1C>EQUAL 7.0%<8.0%: CPT | Performed by: FAMILY MEDICINE

## 2023-02-28 RX ORDER — ALBUTEROL SULFATE 90 UG/1
AEROSOL, METERED RESPIRATORY (INHALATION)
Qty: 6.7 G | Refills: 3 | Status: SHIPPED | OUTPATIENT
Start: 2023-02-28

## 2023-02-28 RX ORDER — CYANOCOBALAMIN 1000 UG/ML
INJECTION, SOLUTION INTRAMUSCULAR; SUBCUTANEOUS
Qty: 14 ML | Refills: 3
Start: 2023-02-28

## 2023-02-28 RX ORDER — PANTOPRAZOLE SODIUM 40 MG/1
TABLET, DELAYED RELEASE ORAL
Qty: 30 TABLET | Refills: 11 | Status: SHIPPED | OUTPATIENT
Start: 2023-02-28

## 2023-02-28 RX ORDER — PREDNISONE 10 MG/1
10 TABLET ORAL 2 TIMES DAILY
Qty: 10 TABLET | Refills: 0 | Status: SHIPPED | OUTPATIENT
Start: 2023-02-28 | End: 2023-03-05

## 2023-02-28 RX ORDER — IBUPROFEN 800 MG/1
800 TABLET ORAL
Qty: 60 TABLET | Refills: 1 | Status: SHIPPED | OUTPATIENT
Start: 2023-02-28

## 2023-02-28 RX ORDER — MINERAL OIL
ENEMA (ML) RECTAL
Qty: 30 TABLET | Refills: 6 | Status: SHIPPED | OUTPATIENT
Start: 2023-02-28

## 2023-02-28 RX ORDER — ALIROCUMAB 75 MG/ML
INJECTION, SOLUTION SUBCUTANEOUS
Qty: 2 ML | Refills: 11 | Status: SHIPPED | OUTPATIENT
Start: 2023-02-28

## 2023-02-28 RX ORDER — LISINOPRIL 40 MG/1
40 TABLET ORAL DAILY
Qty: 90 TABLET | Refills: 3 | Status: SHIPPED | OUTPATIENT
Start: 2023-02-28

## 2023-02-28 NOTE — PROGRESS NOTES
Patient identified by 2 identifiers. Chief Complaint   Patient presents with    Follow-up    Diabetes     1. Have you been to the ER, urgent care clinic since your last visit? Hospitalized since your last visit? No    2. Have you seen or consulted any other health care providers outside of the [address] [place] [address] since your last visit? Include any pap smears or colon screening.  No

## 2023-02-28 NOTE — PROGRESS NOTES
HISTORY OF PRESENT ILLNESS  Yeison Valadez is a 62 y.o. female. HPI   Follow up on chronic medical problems. Overall has been feeling well. C/o left hip pain for the past several months. Hurts on the outside of the hip. Hurts ot lay on the left side. Pain comes and goes but worse with walking. No injury noted. Has no swelling. Has not taken anything for the pain. Pain is 5-6/10. Has no popping or giving away of the hip. No radiation of pain. Cardiovascular Review:  The patient has hypertension and hyperlipidemia. Diet and Lifestyle: generally follows a low fat low cholesterol diet, generally follows a low sodium diet, exercises sporadically, nonsmoker  Home BP Monitoring: is not measured at home. Pertinent ROS: taking medications as instructed, no medication side effects noted, no TIA's, no chest pain on exertion, no dyspnea on exertion, no swelling of ankles, no palpitations, no muscle aches or pain. Diabetes Mellitus:  She has diabetes mellitus type 1 dx at the age of 25. She has been on insulin since her diagnoses. She has retinopathy. Recently started on ozempic to help with better BS control. She is adjusting well to her insulin pump(has the omnipod). BS had been improved and ranging in the low to mid 100s. However recently her BS readings have been much better ranging in the low to mid 100s. Weight is down by another 15 pounds since her last OV in November. Avg  per monitor. In range 83% of the time. Diabetic ROS - medication compliance: compliant most of the time, diabetic diet compliance: compliant most of the time, home glucose monitoring: is performed regularly with the [facility] system. She has  been exercising some with walking 2 to 3 times a week. Further diabetic ROS: no chest pain, dyspnea or TIA's, no numbness, has some  tingling in the extremities which has been stable. Vision has been stable. HM:  Mammo: 7/1/2022  Colonoscopy: 8/21/2019.   Repeat in 10 years. Eye exam: last week. Patient Active Problem List   Diagnosis Code    Gastroesophageal reflux disease without esophagitis K21.9    History of hysterectomy for benign disease Z90.710    Diabetic retinopathy ([facility] [address] E11.319    Type 1 diabetes mellitus with retinopathy of right eye without macular edema ([facility] [address] E10.319    Encounter for long-term (current) use of insulin ([facility] [address] Z79.4    Encounter for medication monitoring Z51.81    Essential hypertension I10    Mixed hyperlipidemia E78.2    Chronic constipation K59.09    Severe obesity (BMI 35.0-39. 9) with comorbidity ([facility]) E66.01    Uncontrolled type 1 diabetes mellitus with retinopathy of both eyes [id number]    Type 2 diabetes with nephropathy ([facility]) E11.21       Current Outpatient Medications   Medication Sig Dispense Refill    semaglutide, weight loss, (Wegovy) 0.5 mg/0.5 mL pnij 0.5 mg by SubCUTAneous route every seven (7) days. 4 Each 2    albuterol (PROVENTIL HFA, VENTOLIN HFA, PROAIR HFA) 90 mcg/actuation inhaler INHALE 2 PUFFS EVERY 4 HOURS AS NEEDED FOR WHEEZING/ SHORTNESS OF BREATH 6.7 g 3    Praluent Pen 75 mg/mL injector pen INJECT 1 ML UNDER THE SKIN ONCE EVERY 2 WEEKS 2 mL 11    ibuprofen (MOTRIN) 800 mg tablet TAKE 1 TABLET BY MOUTH TWICE DAILY AS NEEDED FOR PAIN 60 Tablet 1    NovoLOG U-100 Insulin aspart 100 unit/mL injection USE WITH INSULIN PUMP FOR TOTAL DAILY DOSE  UNITS AS DIRECTED. 150 mL 3    omeprazole (PRILOSEC) 40 mg capsule Take 1 Capsule by mouth daily. Blood-Glucose Sensor (Dexcom G6 Sensor) madyson Use to check glucose continuously connected to omnipod insulin pump 9 Each 3    blood-glucose transmitter (DEXCOM G6 TRANSMITTER) by Does Not Apply route. insulin pump cart,automated,BT (Omnipod 5 G6 Pods, Gen 5,) crtg 1 Each by SubCUTAneous route every seventy-two (72) hours. 6 Box 11    insulin pump cart,auto,BT-cntr (Omnipod 5 G6 Intro Kit, Gen 5,) crtg 1 Each by SubCUTAneous route every seventy-two (72) hours.  1 Kit 0    amLODIPine (NORVASC) 5 mg tablet TAKE 2 TABLETS BY MOUTH DAILY 180 Tablet 3    loratadine (CLARITIN) 10 mg tablet Take 1 Tablet by mouth daily as needed for Allergies. cyanocobalamin (VITAMIN B12) 1,000 mcg/mL injection INJECT 1 ML INTO THE MUSCLE EVERY 30 DAYS 14 mL 3    lancets (Accu-Chek Fastclix Lancet Drum) misc Use to check sugar 3 times daily to calibrate with insulin pump / sensor as directed. 1 Each 11    insulin lispro (HUMALOG) 100 unit/mL injection Use with insulin pump for total daily dose of 150 units as directed. Use 10 units with breakfast, 10 units with lunch, and 12 units with dinner for bolus doses as directed. Dispense vials please. 1 Each 0    glucose blood VI test strips (FreeStyle Precision Justice Strips) strip Use to check blood sugar with fingerstick when Jim Francis advises you to do so. 50 Strip 2    fexofenadine (ALLEGRA) 180 mg tablet TAKE 1 TABLET BY MOUTH DAILY AS NEEDED FOR ALLERGIES OR RHINITIS 30 Tablet 6    pantoprazole (PROTONIX) 40 mg tablet TAKE 1 TABLET BY MOUTH ONCE DAILY 30 Tablet 11    lisinopriL (PRINIVIL, ZESTRIL) 40 mg tablet Take 1 Tablet by mouth daily. 90 Tablet 3    atorvastatin (LIPITOR) 80 mg tablet TAKE 1 TABLET BY MOUTH DAILY 30 Tab 11    topiramate (TOPAMAX) 50 mg tablet TAKE 1 TABLET BY MOUTH TWICE DAILY 60 Tab 5    fluticasone propionate (FLONASE) 50 mcg/actuation nasal spray INSTILL 2 SPRAYS IN EACH NOSTRIL ONCE DAILY AS NEEDED FOR NASAL CONGESTION 1 Bottle 6    ezetimibe (ZETIA) 10 mg tablet TAKE 1 TABLET BY MOUTH DAILY 30 Tab 11    linaCLOtide (LINZESS) 290 mcg cap capsule Take 290 mcg by mouth Daily (before breakfast).          Allergies   Allergen Reactions    Lasix [Furosemide] Hives    Percocet [Oxycodone-Acetaminophen] Other (comments)     AMS           Past Medical History:   Diagnosis Date    Diabetes ([facility] [address]     Diabetic retinopathy ([facility] [address]     right eye     Gestational diabetes     Hypercholesterolemia     Hypertension     Type 1 diabetes ([facility] [address] Past Surgical History:   Procedure Laterality Date    HX BREAST REDUCTION Bilateral 1984    HX CATARACT REMOVAL  2018    left eye    HX  SECTION  1990    HX  SECTION  2000    HX COLONOSCOPY      HX HEENT  2016    right eye surgery- repaired blood vessel    HX HEENT  2017    left eye surgery- removal of scar tissue    HX HEENT  2018    left eye-laser    HX HEENT  10/23/2018    left eye injection    HX HEENT  2018    left eye intravitreal injection    HX HEENT  2019    left eye intravitreal injection    HX HYSTERECTOMY      partial           Family History   Problem Relation Age of Onset    Hypertension Mother     Diabetes Father     No Known Problems Brother     Suicide Brother     Asthma Daughter     Asthma Son     Psoriasis Son        Social History     Tobacco Use    Smoking status: Never    Smokeless tobacco: Never   Substance Use Topics    Alcohol use: No     Alcohol/week: 0.0 standard drinks        Lab Results   Component Value Date/Time    WBC 6.5 2022 12:07 PM    HGB 12.7 2022 12:07 PM    HCT 41.9 2022 12:07 PM    PLATELET 704  12:07 PM    MCV 83.3 2022 12:07 PM     Lab Results   Component Value Date/Time    Cholesterol, total 175 2022 10:29 AM    HDL Cholesterol 56 2022 10:29 AM    LDL, calculated 103.4 (H) 2022 10:29 AM    Triglyceride 78 2022 10:29 AM    CHOL/HDL Ratio 3.1 2022 10:29 AM     Lab Results   Component Value Date/Time    TSH 1.25 2022 12:40 PM      Lab Results   Component Value Date/Time    Sodium 142 2022 10:29 AM    Potassium 4.3 2022 10:29 AM    Chloride 108 2022 10:29 AM    CO2 27 2022 10:29 AM    Anion gap 7 2022 10:29 AM    Glucose 190 (H) 2022 10:29 AM    BUN 12 2022 10:29 AM    Creatinine 0.98 2022 10:29 AM    BUN/Creatinine ratio 12 2022 10:29 AM    GFR est AA >60 2022 12:07 PM    GFR est non-AA 52 (L) 08/22/2022 12:07 PM    Calcium 8.7 11/29/2022 10:29 AM    Bilirubin, total 0.3 08/22/2022 12:07 PM    ALT (SGPT) 48 08/22/2022 12:07 PM    Alk. phosphatase 142 (H) 08/22/2022 12:07 PM    Protein, total 7.0 08/22/2022 12:07 PM    Albumin 3.5 08/22/2022 12:07 PM    Globulin 3.5 08/22/2022 12:07 PM    A-G Ratio 1.0 (L) 08/22/2022 12:07 PM      Lab Results   Component Value Date/Time    Hemoglobin A1c 11.5 (H) 02/25/2010 08:25 AM    Hemoglobin A1c (POC) 7.6 11/29/2022 10:00 AM         Review of Systems   Constitutional:  Negative for malaise/fatigue. HENT:  Negative for congestion. Eyes:  Negative for blurred vision. Respiratory:  Negative for cough and shortness of breath. Cardiovascular:  Negative for chest pain, palpitations and leg swelling. Gastrointestinal:  Negative for abdominal pain, constipation and heartburn. Genitourinary:  Negative for dysuria, frequency and urgency. Musculoskeletal:  Negative for back pain and joint pain. Neurological:  Negative for dizziness, tingling and headaches. Endo/Heme/Allergies:  Negative for environmental allergies. Psychiatric/Behavioral:  Negative for depression. The patient does not have insomnia. Physical Exam  Vitals and nursing note reviewed. Constitutional:       Appearance: Normal appearance. She is well-developed. Comments: /70   Pulse 83   Temp 97.7 °F (36.5 °C) (Oral)   Resp 17   Ht 5' 2\" (1.575 m)   Wt 210 lb 9.6 oz (95.5 kg)   LMP  (LMP Unknown)   SpO2 97%   BMI 38.52 kg/m²    HENT:      Right Ear: Tympanic membrane and ear canal normal.      Left Ear: Tympanic membrane and ear canal normal.   Neck:      Thyroid: No thyromegaly. Cardiovascular:      Rate and Rhythm: Normal rate and regular rhythm. Heart sounds: Normal heart sounds. Pulmonary:      Effort: Pulmonary effort is normal.      Breath sounds: Normal breath sounds.    Abdominal:      General: Bowel sounds are normal.      Palpations: Abdomen is soft. There is no mass. Tenderness: There is no abdominal tenderness. Musculoskeletal:         General: Normal range of motion. Cervical back: Normal range of motion and neck supple. Right lower leg: No edema. Left lower leg: No edema. Lymphadenopathy:      Cervical: No cervical adenopathy. Skin:     General: Skin is warm and dry. Neurological:      General: No focal deficit present. Mental Status: She is alert and oriented to person, place, and time. Psychiatric:         Mood and Affect: Mood normal.       ASSESSMENT and PLAN  Diagnoses and all orders for this visit:    1. Type 1 diabetes mellitus with retinopathy of both eyes, macular edema presence unspecified, unspecified retinopathy severity (HCC)  A1c at 7.1% down from 7.6%. -     AMB POC HEMOGLOBIN A1C  -     AMB POC GLUCOSE, QUANTITATIVE, BLOOD    2. Essential hypertension  -     refill lisinopriL (PRINIVIL, ZESTRIL) 40 mg tablet; Take 1 Tablet by mouth daily. 3. Mixed hyperlipidemia  Stable at near goal.    -     alirocumab (Praluent Pen) 75 mg/mL injector pen; INJECT 1 ML UNDER THE SKIN ONCE EVERY 2 WEEKS    4. B12 deficiency  -     refill cyanocobalamin (VITAMIN B12) 1,000 mcg/mL injection; INJECT 1 ML INTO THE MUSCLE EVERY 30 DAYS    5. Gastroesophageal reflux disease without esophagitis  -     pantoprazole (PROTONIX) 40 mg tablet; TAKE 1 TABLET BY MOUTH ONCE DAILY    6. Greater trochanteric bursitis of left hip  -     ibuprofen (MOTRIN) 800 mg tablet; Take 1 Tablet by mouth three (3) times daily as needed for Pain.  -     XR HIP LT W OR WO PELV 2-3 VWS; Future  -     predniSONE (DELTASONE) 10 mg tablet; Take 10 mg by mouth two (2) times a day for 5 days. Instructions for exercises given and reviewed with pt. Pt also to use heat to the area 3-4 times a day over the next several days until sx are improved.       7. Environmental allergies  -     albuterol (PROVENTIL HFA, VENTOLIN HFA, PROAIR HFA) 90 mcg/actuation inhaler; INHALE 2 PUFFS EVERY 4 HOURS AS NEEDED FOR WHEEZING/ SHORTNESS OF BREATH  -     fexofenadine (ALLEGRA) 180 mg tablet; TAKE 1 TABLET BY MOUTH DAILY AS NEEDED FOR ALLERGIES OR RHINITIS    8. Encounter for medication monitoring    9. Encounter for screening mammogram for breast cancer  -     [facility] 3D DARNELL W MAMMO BI SCREENING INCL CAD; Future    10. Non morbid obesity  I have reviewed/discussed the above normal BMI with the patient. I have recommended the following interventions: dietary management education, guidance, and counseling . Follow-up and Dispositions    Return in about 4 months (around 6/28/2023). reviewed diet, exercise and weight control  cardiovascular risk and specific lipid/LDL goals reviewed  reviewed medications and side effects in detail  specific diabetic recommendations: low cholesterol diet, weight control and daily exercise discussed, all medications, side effects and compliance discussed carefully, foot care discussed and Podiatry visits discussed, annual eye examinations at Ophthalmology discussed, and glycohemoglobin and other lab monitoring discussed      I have discussed diagnosis listed in this note with pt and/or family. I have discussed treatment plans and options and the risk/benefit analysis of those options, including safe use of medications and possible medication side effects. Through the use of shared decision making we have agreed to the above plan. The patient has received an after-visit summary and questions were answered concerning future plans and follow up. Advise pt of any urgent changes then to proceed to the ER.

## 2023-03-24 ENCOUNTER — TELEPHONE (OUTPATIENT)
Dept: FAMILY MEDICINE CLINIC | Age: 58
End: 2023-03-24

## 2023-03-24 RX ORDER — SEMAGLUTIDE 1 MG/.5ML
1 INJECTION, SOLUTION SUBCUTANEOUS
Qty: 4 EACH | Refills: 5 | Status: SHIPPED | OUTPATIENT
Start: 2023-03-24

## 2023-03-24 NOTE — TELEPHONE ENCOUNTER
Patient contacted me regarding her Wegovy. She's taking the 0.5 mg dose every 7 days  Has lost weight and her A1c has gone down to 7.1% (7.6%, 8.7%)  Feels like it has \"stopped working\" / appetite suppression not as much   In need of dose titration   Saw PCP in Feb and has follow up in June.    Order for Wegovy 1 mg dose every 7 days sent to pharmacy per CPA with Dr. Atilio Aggarwal, PHARMD, [address] [place] [address]    Program: Medical Group  CPA in place: Yes  Recommendation Provided To: Patient/Caregiver: 1 via Telephone  Intervention Detail: Dose Adjustment: 1, reason: Therapy Optimization  Intervention Accepted By: Patient/Caregiver: 1  Gap Closed?: Yes  Time Spent (min): 30

## 2023-03-31 RX ORDER — SEMAGLUTIDE 0.5 MG/.5ML
0.5 INJECTION, SOLUTION SUBCUTANEOUS
Qty: 4 EACH | Refills: 2 | Status: SHIPPED | OUTPATIENT
Start: 2023-03-31

## 2023-03-31 NOTE — PROGRESS NOTES
Patient contacted me to let me know the wegovy 1 mg dose is not available / on back order  She's out of the medication; would like to stay on the wegovy 0.5 mg dose until 1 mg available. Order sent to pharmacy. Per CPA with Dr. Gonzalez Faster  A1c in Feb 7.1% and weight was down 15 pounds.    Has follow up in June with PCP  Kaitlin Helton, PHARMD, [facility]    Program: Medical Group  CPA in place: Yes  Recommendation Provided To: Pharmacy: 1  Intervention Detail: Refill(s) Provided  Intervention Accepted By: Pharmacy: 1  Gap Closed?: Yes  Time Spent (min): 15

## 2023-04-22 DIAGNOSIS — Z12.31 ENCOUNTER FOR SCREENING MAMMOGRAM FOR BREAST CANCER: Primary | ICD-10-CM

## 2023-05-16 ENCOUNTER — CLINICAL DOCUMENTATION (OUTPATIENT)
Age: 58
End: 2023-05-16

## 2023-05-19 ENCOUNTER — SCHEDULED TELEPHONE ENCOUNTER (OUTPATIENT)
Age: 58
End: 2023-05-19

## 2023-05-19 DIAGNOSIS — E78.2 MIXED HYPERLIPIDEMIA: Primary | ICD-10-CM

## 2023-05-19 RX ORDER — EVOLOCUMAB 140 MG/ML
140 INJECTION, SOLUTION SUBCUTANEOUS
Qty: 2 EACH | Refills: 2 | Status: SHIPPED | OUTPATIENT
Start: 2023-05-19

## 2023-05-19 NOTE — PROGRESS NOTES
Telephone visit regarding patient's medication. She states that she received a letter from her insurance that states the [facility] would no longer be covered and that they are now covering [address]. She's requesting a prescription for the Repatha. Saw PCP in Feb and has PCP follow up in June - lipids may be rechecked at that time. Lipids:       Sent order for Repatha per CPA with Dr. Chandler Sue  Pt has been educated on administration. Will call if any issues before PCP visit. Twyla Belle, PharmD, [facility]    Program: Medical Group  CPA in place:  Yes  Recommendation Provided To: Patient/Caregiver: 2 via Telephone  Intervention Detail: Benefit Assistance and New Rx: 1, reason: Cost/Formulary Change  Intervention Accepted By: Patient/Caregiver: 2  Time Spent (min): 30

## 2023-05-22 ENCOUNTER — TELEPHONE (OUTPATIENT)
Age: 58
End: 2023-05-22

## 2023-05-23 ENCOUNTER — SCHEDULED TELEPHONE ENCOUNTER (OUTPATIENT)
Age: 58
End: 2023-05-23

## 2023-05-23 DIAGNOSIS — E10.319 TYPE 1 DIABETES MELLITUS WITH RETINOPATHY OF RIGHT EYE WITHOUT MACULAR EDEMA, UNSPECIFIED RETINOPATHY SEVERITY (HCC): Primary | ICD-10-CM

## 2023-05-23 RX ORDER — INSULIN PMP CART,AUT,G6/7,CNTR
EACH SUBCUTANEOUS
COMMUNITY
Start: 2023-05-11

## 2023-05-23 RX ORDER — PROCHLORPERAZINE 25 MG/1
SUPPOSITORY RECTAL
Qty: 1 EACH | Refills: 3 | Status: SHIPPED | OUTPATIENT
Start: 2023-05-23

## 2023-05-23 RX ORDER — PROCHLORPERAZINE 25 MG/1
SUPPOSITORY RECTAL
COMMUNITY
Start: 2023-05-01

## 2023-05-23 NOTE — PROGRESS NOTES
Telephone visit to follow up on pt's message that she is in need of her Dexcom G6 transmitter. Also see a note that a PA is needed for Repatha; key is: [id number]  Change was made to [address] last week as pt got a letter from her insurance that this would be the covered agent vs. [employer]. Pt is using the dexcom G6 system along with the omnipod 5 pump with novolog. She is also taking wegovy for weight management. Reviewed and reconciled medications. Reviewed her dexcom clarity glucose monitoring data (see below)  GMI is 7.4%, in range 64% of the time; looks like needs some adjustments to her pump settings (elevations in the 12 noon to 9 PM time frame)   Last A1c was 7.1%  Sees PCP on 6/14 - will try to see pt on the same day to consider any adjustments that may need to be made     Sending order to pharmacy for dexcom G6 transmitter  Per CPA with Dr. Christopher Tello    Will look further into the PA for Repatha and follow up as indicated               Twyla Bowles, PharmD, [facility]    Program: Medical Group  CPA in place:  Yes  Recommendation Provided To: Patient/Caregiver: 4 via Telephone  Intervention Detail: Benefit Assistance, Discontinued Rx: 2, reason: Therapy Complete, and Refill(s) Provided  Intervention Accepted By: Patient/Caregiver: 4  Gap Closed?: Yes   Time Spent (min): 30

## 2023-05-26 ENCOUNTER — SCHEDULED TELEPHONE ENCOUNTER (OUTPATIENT)
Age: 58
End: 2023-05-26

## 2023-05-26 DIAGNOSIS — E78.2 MIXED HYPERLIPIDEMIA: Primary | ICD-10-CM

## 2023-05-30 ENCOUNTER — CLINICAL DOCUMENTATION (OUTPATIENT)
Age: 58
End: 2023-05-30

## 2023-05-31 ENCOUNTER — CLINICAL DOCUMENTATION (OUTPATIENT)
Age: 58
End: 2023-05-31

## 2023-06-28 ENCOUNTER — OFFICE VISIT (OUTPATIENT)
Age: 58
End: 2023-06-28
Payer: COMMERCIAL

## 2023-06-28 ENCOUNTER — PHARMACY VISIT (OUTPATIENT)
Age: 58
End: 2023-06-28

## 2023-06-28 VITALS
SYSTOLIC BLOOD PRESSURE: 125 MMHG | TEMPERATURE: 98.6 F | WEIGHT: 167.6 LBS | RESPIRATION RATE: 18 BRPM | DIASTOLIC BLOOD PRESSURE: 65 MMHG | HEART RATE: 89 BPM | BODY MASS INDEX: 31.64 KG/M2 | HEIGHT: 61 IN | OXYGEN SATURATION: 100 %

## 2023-06-28 DIAGNOSIS — Z23 IMMUNIZATION DUE: ICD-10-CM

## 2023-06-28 DIAGNOSIS — E10.319 TYPE 1 DIABETES MELLITUS WITH RETINOPATHY OF RIGHT EYE WITHOUT MACULAR EDEMA, UNSPECIFIED RETINOPATHY SEVERITY (HCC): ICD-10-CM

## 2023-06-28 DIAGNOSIS — E78.2 MIXED HYPERLIPIDEMIA: ICD-10-CM

## 2023-06-28 DIAGNOSIS — E10.319 TYPE 1 DIABETES MELLITUS WITH RETINOPATHY OF RIGHT EYE WITHOUT MACULAR EDEMA, UNSPECIFIED RETINOPATHY SEVERITY (HCC): Primary | ICD-10-CM

## 2023-06-28 DIAGNOSIS — K21.9 GASTRO-ESOPHAGEAL REFLUX DISEASE WITHOUT ESOPHAGITIS: ICD-10-CM

## 2023-06-28 DIAGNOSIS — I10 ESSENTIAL (PRIMARY) HYPERTENSION: Primary | ICD-10-CM

## 2023-06-28 DIAGNOSIS — Z79.899 ENCOUNTER FOR LONG-TERM (CURRENT) USE OF MEDICATIONS: ICD-10-CM

## 2023-06-28 DIAGNOSIS — Z79.4 LONG TERM (CURRENT) USE OF INSULIN (HCC): ICD-10-CM

## 2023-06-28 LAB
GLUCOSE, POC: 85 MG/DL
HBA1C MFR BLD: 7.1 %

## 2023-06-28 PROCEDURE — 82962 GLUCOSE BLOOD TEST: CPT | Performed by: FAMILY MEDICINE

## 2023-06-28 PROCEDURE — 99214 OFFICE O/P EST MOD 30 MIN: CPT | Performed by: FAMILY MEDICINE

## 2023-06-28 PROCEDURE — 3074F SYST BP LT 130 MM HG: CPT | Performed by: FAMILY MEDICINE

## 2023-06-28 PROCEDURE — 83036 HEMOGLOBIN GLYCOSYLATED A1C: CPT | Performed by: FAMILY MEDICINE

## 2023-06-28 PROCEDURE — 90471 IMMUNIZATION ADMIN: CPT | Performed by: FAMILY MEDICINE

## 2023-06-28 PROCEDURE — 3078F DIAST BP <80 MM HG: CPT | Performed by: FAMILY MEDICINE

## 2023-06-28 PROCEDURE — 90715 TDAP VACCINE 7 YRS/> IM: CPT | Performed by: FAMILY MEDICINE

## 2023-06-28 RX ORDER — ALIROCUMAB 75 MG/ML
INJECTION, SOLUTION SUBCUTANEOUS
COMMUNITY
Start: 2023-06-23

## 2023-06-28 SDOH — ECONOMIC STABILITY: INCOME INSECURITY: HOW HARD IS IT FOR YOU TO PAY FOR THE VERY BASICS LIKE FOOD, HOUSING, MEDICAL CARE, AND HEATING?: NOT HARD AT ALL

## 2023-06-28 SDOH — ECONOMIC STABILITY: FOOD INSECURITY: WITHIN THE PAST 12 MONTHS, THE FOOD YOU BOUGHT JUST DIDN'T LAST AND YOU DIDN'T HAVE MONEY TO GET MORE.: NEVER TRUE

## 2023-06-28 SDOH — ECONOMIC STABILITY: HOUSING INSECURITY
IN THE LAST 12 MONTHS, WAS THERE A TIME WHEN YOU DID NOT HAVE A STEADY PLACE TO SLEEP OR SLEPT IN A SHELTER (INCLUDING NOW)?: NO

## 2023-06-28 SDOH — ECONOMIC STABILITY: FOOD INSECURITY: WITHIN THE PAST 12 MONTHS, YOU WORRIED THAT YOUR FOOD WOULD RUN OUT BEFORE YOU GOT MONEY TO BUY MORE.: NEVER TRUE

## 2023-06-28 ASSESSMENT — ANXIETY QUESTIONNAIRES
3. WORRYING TOO MUCH ABOUT DIFFERENT THINGS: 0
IF YOU CHECKED OFF ANY PROBLEMS ON THIS QUESTIONNAIRE, HOW DIFFICULT HAVE THESE PROBLEMS MADE IT FOR YOU TO DO YOUR WORK, TAKE CARE OF THINGS AT HOME, OR GET ALONG WITH OTHER PEOPLE: NOT DIFFICULT AT ALL
2. NOT BEING ABLE TO STOP OR CONTROL WORRYING: 0
GAD7 TOTAL SCORE: 0
6. BECOMING EASILY ANNOYED OR IRRITABLE: 0
GAD7 TOTAL SCORE: 0
5. BEING SO RESTLESS THAT IT IS HARD TO SIT STILL: 0
7. FEELING AFRAID AS IF SOMETHING AWFUL MIGHT HAPPEN: 0
4. TROUBLE RELAXING: 0
1. FEELING NERVOUS, ANXIOUS, OR ON EDGE: 0

## 2023-06-28 ASSESSMENT — ENCOUNTER SYMPTOMS
CHEST TIGHTNESS: 0
SHORTNESS OF BREATH: 0
COUGH: 0
CONSTIPATION: 0
BLOOD IN STOOL: 0
ABDOMINAL PAIN: 0
RHINORRHEA: 0

## 2023-06-28 ASSESSMENT — PATIENT HEALTH QUESTIONNAIRE - PHQ9
1. LITTLE INTEREST OR PLEASURE IN DOING THINGS: 0
SUM OF ALL RESPONSES TO PHQ QUESTIONS 1-9: 0
SUM OF ALL RESPONSES TO PHQ QUESTIONS 1-9: 0
SUM OF ALL RESPONSES TO PHQ9 QUESTIONS 1 & 2: 0
SUM OF ALL RESPONSES TO PHQ QUESTIONS 1-9: 0
2. FEELING DOWN, DEPRESSED OR HOPELESS: 0
SUM OF ALL RESPONSES TO PHQ QUESTIONS 1-9: 0

## 2023-06-29 LAB
ALBUMIN SERPL-MCNC: 4.1 G/DL (ref 3.8–4.9)
ALBUMIN/GLOB SERPL: 2.2 {RATIO} (ref 1.2–2.2)
ALP SERPL-CCNC: 98 IU/L (ref 44–121)
ALT SERPL-CCNC: 19 IU/L (ref 0–32)
AST SERPL-CCNC: 20 IU/L (ref 0–40)
BILIRUB SERPL-MCNC: 0.2 MG/DL (ref 0–1.2)
BUN SERPL-MCNC: 12 MG/DL (ref 6–24)
BUN/CREAT SERPL: 13 (ref 9–23)
CALCIUM SERPL-MCNC: 9 MG/DL (ref 8.7–10.2)
CHLORIDE SERPL-SCNC: 107 MMOL/L (ref 96–106)
CHOLEST SERPL-MCNC: 135 MG/DL (ref 100–199)
CO2 SERPL-SCNC: 22 MMOL/L (ref 20–29)
CREAT SERPL-MCNC: 0.96 MG/DL (ref 0.57–1)
EGFRCR SERPLBLD CKD-EPI 2021: 69 ML/MIN/1.73
ERYTHROCYTE [DISTWIDTH] IN BLOOD BY AUTOMATED COUNT: 13.2 % (ref 11.7–15.4)
GLOBULIN SER CALC-MCNC: 1.9 G/DL (ref 1.5–4.5)
GLUCOSE SERPL-MCNC: 111 MG/DL (ref 70–99)
HCT VFR BLD AUTO: 38.5 % (ref 34–46.6)
HDLC SERPL-MCNC: 55 MG/DL
HGB BLD-MCNC: 12.6 G/DL (ref 11.1–15.9)
IMP & REVIEW OF LAB RESULTS: NORMAL
LDLC SERPL CALC-MCNC: 66 MG/DL (ref 0–99)
MCH RBC QN AUTO: 26.5 PG (ref 26.6–33)
MCHC RBC AUTO-ENTMCNC: 32.7 G/DL (ref 31.5–35.7)
MCV RBC AUTO: 81 FL (ref 79–97)
PLATELET # BLD AUTO: 226 X10E3/UL (ref 150–450)
POTASSIUM SERPL-SCNC: 4.4 MMOL/L (ref 3.5–5.2)
PROT SERPL-MCNC: 6 G/DL (ref 6–8.5)
RBC # BLD AUTO: 4.76 X10E6/UL (ref 3.77–5.28)
SODIUM SERPL-SCNC: 143 MMOL/L (ref 134–144)
TRIGL SERPL-MCNC: 66 MG/DL (ref 0–149)
VLDLC SERPL CALC-MCNC: 14 MG/DL (ref 5–40)
WBC # BLD AUTO: 5.7 X10E3/UL (ref 3.4–10.8)

## 2023-07-05 ENCOUNTER — CLINICAL DOCUMENTATION (OUTPATIENT)
Age: 58
End: 2023-07-05

## 2023-07-07 ENCOUNTER — HOSPITAL ENCOUNTER (OUTPATIENT)
Facility: HOSPITAL | Age: 58
Discharge: HOME | End: 2023-07-07
Attending: FAMILY MEDICINE
Payer: COMMERCIAL

## 2023-07-07 DIAGNOSIS — Z12.31 VISIT FOR SCREENING MAMMOGRAM: ICD-10-CM

## 2023-07-07 PROCEDURE — 77063 BREAST TOMOSYNTHESIS BI: CPT

## 2023-07-07 RX ORDER — CYANOCOBALAMIN 1000 UG/ML
INJECTION, SOLUTION INTRAMUSCULAR; SUBCUTANEOUS
Qty: 14 ML | Refills: 3 | Status: SHIPPED | OUTPATIENT
Start: 2023-07-07

## 2023-07-11 ENCOUNTER — SCHEDULED TELEPHONE ENCOUNTER (OUTPATIENT)
Age: 58
End: 2023-07-11

## 2023-07-11 DIAGNOSIS — E78.2 MIXED HYPERLIPIDEMIA: Primary | ICD-10-CM

## 2023-07-11 RX ORDER — EVOLOCUMAB 140 MG/ML
140 INJECTION, SOLUTION SUBCUTANEOUS
Qty: 6 ML | Refills: 3 | Status: SHIPPED | OUTPATIENT
Start: 2023-07-11

## 2023-07-11 NOTE — PROGRESS NOTES
Follow up visit regarding formulary change July 1st with pt's Rx plan, [facility]. Repatha preferred over Praluent. Order sent to pharmacy  Per CPA with Dr. Tawny Marquez  There still may need to be a PA completed for the Repatha even though it's preferred. Will look for PA to complete and review with patient as indicated  Twyla Scales, PharmD, [address]    Program: Medical Group  CPA in place:  Yes  Recommendation Provided To: Patient/Caregiver: 1 via Telephone  Intervention Detail: New Rx: 1, reason: Cost/Formulary Change  Intervention Accepted By: Patient/Caregiver: 1  Time Spent (min): 15

## 2023-07-12 ENCOUNTER — CLINICAL DOCUMENTATION (OUTPATIENT)
Age: 58
End: 2023-07-12

## 2023-07-13 ENCOUNTER — CLINICAL DOCUMENTATION (OUTPATIENT)
Age: 58
End: 2023-07-13

## 2023-07-20 ENCOUNTER — SCHEDULED TELEPHONE ENCOUNTER (OUTPATIENT)
Age: 58
End: 2023-07-20

## 2023-07-20 DIAGNOSIS — E10.319 TYPE 1 DIABETES MELLITUS WITH RETINOPATHY OF RIGHT EYE WITHOUT MACULAR EDEMA, UNSPECIFIED RETINOPATHY SEVERITY (HCC): Primary | ICD-10-CM

## 2023-07-20 RX ORDER — SEMAGLUTIDE 1.34 MG/ML
1 INJECTION, SOLUTION SUBCUTANEOUS
Qty: 3 ML | Refills: 0 | Status: SHIPPED | COMMUNITY
Start: 2023-07-20

## 2023-07-20 RX ORDER — SEMAGLUTIDE 1.7 MG/.75ML
1.7 INJECTION, SOLUTION SUBCUTANEOUS
Qty: 3 ML | Refills: 3 | Status: SHIPPED | OUTPATIENT
Start: 2023-07-20

## 2023-07-20 NOTE — PROGRESS NOTES
Contacting patient regarding Rosemary Beady shortage issues. She is taking the Wegovy 1 mg every 7 days dose; but is unable to get her refill due to supply issues. She just ran out of the medication  Discussed with patient going up to the Rosemary Beady 1.7 mg every 7 day dose as she has been on the 1 mg dose for a few months and tolerating it and the 1.7 mg dose is not on back order. Pt feels like this would be a good option. She is worried it won't come in stock at [facility] until next week and does not want to continue off of medication however.  -sent order for Wegovy 1.7 mg to pharmacy  -providing semaglutide 1 mg in the form of Ozempic for one time use if needed while waiting for Wegovy 1.7 mg to come in to the pharmacy  Reviewed with patient  Per CPA with Dr. Margy Miller logged and signed out per policy  Placed in med room fridge for patient to  if needed, labelled with her name. Also following up on the [address] [place] [address]   When a PA was done the message came back that \"no PA is required\" so assume it went through but pt has not picked it up yet  Still saying issues at the pharmacy  Asked pt to call her insurance plan  She did and it was the pharmacy that needed to make an adjustment in the way the order was entered so that it showed that 2 pens would be a 28 day supply; when this was done the order went through and pt can  and start using it when due for next dose after finishing praluent. Pt will follow up as scheduled with PCP and call me in the interim if any questions / medication related issues. All questions were answered and patient verbalized understanding. Twyla Valentin, PharmD, [address] [facility]    Program: Medical Group  CPA in place:  Yes  Recommendation Provided To: Patient/Caregiver: 3 via Telephone  Intervention Detail: Benefit Assistance, Dose Adjustment: 1, reason: Therapy Optimization, and Patient Access Assistance/Sample Provided  Intervention

## 2023-08-20 DIAGNOSIS — J20.9 ACUTE BRONCHITIS, UNSPECIFIED: ICD-10-CM

## 2023-08-23 RX ORDER — ALBUTEROL SULFATE 90 UG/1
2 AEROSOL, METERED RESPIRATORY (INHALATION) EVERY 4 HOURS PRN
Qty: 1 EACH | Refills: 5 | Status: SHIPPED | OUTPATIENT
Start: 2023-08-23

## 2023-08-23 NOTE — TELEPHONE ENCOUNTER
Last appointment: 6/28/23  Next appointment: 10/30/23  Previous refill encounter(s): 2/28/23 #1 with 3 refills    Requested Prescriptions     Pending Prescriptions Disp Refills    albuterol sulfate HFA (PROVENTIL;VENTOLIN;PROAIR) 108 (90 Base) MCG/ACT inhaler [Pharmacy Med Name: ALBUTEROL HFA (PROVENTIL) INH] 1 each 5     Sig: Inhale 2 puffs into the lungs every 4 hours as needed for Wheezing or Shortness of Breath         For Pharmacy Admin Tracking Only    Program: Medication Refill  CPA in place:    Recommendation Provided To:    Intervention Detail: New Rx: 1, reason: Patient Preference  Intervention Accepted By:   Joceline Sullivan Closed?:    Time Spent (min): 5

## 2023-08-29 ENCOUNTER — SCHEDULED TELEPHONE ENCOUNTER (OUTPATIENT)
Age: 58
End: 2023-08-29

## 2023-08-29 DIAGNOSIS — E66.01 SEVERE OBESITY (BMI 35.0-39.9) WITH COMORBIDITY (HCC): Primary | ICD-10-CM

## 2023-08-29 RX ORDER — SEMAGLUTIDE 1.7 MG/.75ML
1.7 INJECTION, SOLUTION SUBCUTANEOUS
Qty: 9 ML | Refills: 3 | Status: SHIPPED | OUTPATIENT
Start: 2023-08-29

## 2023-08-29 NOTE — PROGRESS NOTES
Telephone visit to follow up on medication access    Pt is taking Wegovy 1.7 mg dose but has been unable to get it from her local pharmacy consistently due to shortages, currently has run out and has researched and found that her mail order pharmacy does have the Wegovy 1.7 mg in stock. Denies adverse effects taking Wegovy. [facility] [place] [place] [facility] for her plan; need to send for 3 month supply    Order sent for [facility] P[facility] 1.7 mg every 7 days per CPA with Dr. Diana Huang in October, seen last in June - at that time A1c 7.1% and pt had lost 26 pounds     Will call if any further issues with medication access. All questions were answered and patient verbalized understanding. Twyla Hernandez, PharmD, [address]    Program: Medical Group  CPA in place:  Yes  Recommendation Provided To: Patient/Caregiver: 2 via Telephone  Intervention Detail: Benefit Assistance and Refill(s) Provided  Intervention Accepted By: Patient/Caregiver: 2  Time Spent (min): 20

## 2023-09-08 NOTE — TELEPHONE ENCOUNTER
Last appointment: 6/28/23  Next appointment: 10/30/23  Previous refill encounter(s): 6/13/23 #90 with 1 refill    Requested Prescriptions     Pending Prescriptions Disp Refills    ibuprofen (ADVIL;MOTRIN) 800 MG tablet [Pharmacy Med Name: IBUPROFEN 800 MG TABLET] 90 tablet 1     Sig: TAKE 1 TABLET BY MOUTH THREE (3) TIMES DAILY AS NEEDED FOR PAIN. For Pharmacy Admin Tracking Only    Program: Medication Refill  CPA in place:    Recommendation Provided To:    Intervention Detail: New Rx: 1, reason: Patient Preference  Intervention Accepted By:   Marichuy Fiore Closed?:    Time Spent (min): 5

## 2023-09-11 RX ORDER — IBUPROFEN 800 MG/1
TABLET ORAL
Qty: 90 TABLET | Refills: 1 | Status: SHIPPED | OUTPATIENT
Start: 2023-09-11

## 2023-09-27 ENCOUNTER — SCHEDULED TELEPHONE ENCOUNTER (OUTPATIENT)
Age: 58
End: 2023-09-27

## 2023-09-27 DIAGNOSIS — E66.01 SEVERE OBESITY (BMI 35.0-39.9) WITH COMORBIDITY (HCC): Primary | ICD-10-CM

## 2023-09-28 RX ORDER — PROCHLORPERAZINE 25 MG/1
SUPPOSITORY RECTAL
Qty: 9 EACH | Refills: 3 | Status: SHIPPED | OUTPATIENT
Start: 2023-09-28

## 2023-09-28 NOTE — TELEPHONE ENCOUNTER
Last appointment: 6/28/23  Next appointment: 10/30/23  Previous refill encounter(s): 5/23/23 #1 with 3 refills    Requested Prescriptions     Pending Prescriptions Disp Refills    Continuous Blood Gluc Sensor (DEXCOM G6 SENSOR) MISC [Pharmacy Med Name: Zoya Cavanaugh SENSOR] 1 each 3     Sig: USE TO CHECK GLUCOSE CONTINUOUSLY CONNECTED TO OMNIPOD INSULIN PUMP         For Pharmacy Admin Tracking Only    Program: Medication Refill  CPA in place:    Recommendation Provided To:    Intervention Detail: New Rx: 1, reason: Patient Preference  Intervention Accepted By:   Laci Cadena Closed?:    Time Spent (min): 5

## 2023-09-29 RX ORDER — FEXOFENADINE HCL 180 MG/1
TABLET ORAL
Qty: 90 TABLET | Refills: 2 | Status: SHIPPED | OUTPATIENT
Start: 2023-09-29

## 2023-09-29 RX ORDER — SEMAGLUTIDE 1 MG/.5ML
1 INJECTION, SOLUTION SUBCUTANEOUS
Qty: 9 ML | Refills: 2 | Status: SHIPPED | OUTPATIENT
Start: 2023-09-29

## 2023-09-29 NOTE — PROGRESS NOTES
Following up with patient regarding Wegovy  Having some side effects on the 1.7 mg dose  Will go back down tot he 1 mg dose  Order sent for [facility] 1 mg every 7 days  Per CPA with     Also needs a refill on fexofenadine - asked pt to put request in to PCP for this refill     Last PCP visit on 6/28   Sees PCP again on 10/30    Pt is doing well with the [facility] P[facility]Y and has lost weight  Wt Readings from Last 3 Encounters:   06/28/23 167 lb 9.6 oz (76 kg)   02/28/23 178 lb 6.4 oz (80.9 kg)   11/29/22 193 lb (87.5 kg)     She is also using the omnipod 5 insulin pump and her diabetes has been controlled; last A1c was 7.1%    Pt will let me know if any issues getting the [facility] and if any continued side effects. Twyla Iqbal, PharmD, [address]    Program: Medical Group  CPA in place:  Yes  Recommendation Provided To: Patient/Caregiver: 1 via Telephone  Intervention Detail: Dose Adjustment: 1, reason: ANAYA  Intervention Accepted By: Patient/Caregiver: 1  Time Spent (min): 15

## 2023-10-30 ENCOUNTER — OFFICE VISIT (OUTPATIENT)
Age: 58
End: 2023-10-30
Payer: COMMERCIAL

## 2023-10-30 VITALS
RESPIRATION RATE: 18 BRPM | DIASTOLIC BLOOD PRESSURE: 70 MMHG | SYSTOLIC BLOOD PRESSURE: 130 MMHG | WEIGHT: 152 LBS | HEIGHT: 61 IN | TEMPERATURE: 97.8 F | OXYGEN SATURATION: 97 % | BODY MASS INDEX: 28.7 KG/M2 | HEART RATE: 79 BPM

## 2023-10-30 DIAGNOSIS — Z91.09 ENVIRONMENTAL ALLERGIES: ICD-10-CM

## 2023-10-30 DIAGNOSIS — K21.9 GASTRO-ESOPHAGEAL REFLUX DISEASE WITHOUT ESOPHAGITIS: ICD-10-CM

## 2023-10-30 DIAGNOSIS — E78.2 MIXED HYPERLIPIDEMIA: ICD-10-CM

## 2023-10-30 DIAGNOSIS — Z23 ENCOUNTER FOR IMMUNIZATION: ICD-10-CM

## 2023-10-30 DIAGNOSIS — I10 ESSENTIAL (PRIMARY) HYPERTENSION: ICD-10-CM

## 2023-10-30 DIAGNOSIS — Z79.899 ENCOUNTER FOR LONG-TERM (CURRENT) USE OF MEDICATIONS: ICD-10-CM

## 2023-10-30 DIAGNOSIS — Z79.4 LONG TERM (CURRENT) USE OF INSULIN (HCC): ICD-10-CM

## 2023-10-30 DIAGNOSIS — Z96.41 INSULIN PUMP IN PLACE: ICD-10-CM

## 2023-10-30 DIAGNOSIS — E10.319 TYPE 1 DIABETES MELLITUS WITH RETINOPATHY OF RIGHT EYE WITHOUT MACULAR EDEMA, UNSPECIFIED RETINOPATHY SEVERITY (HCC): Primary | ICD-10-CM

## 2023-10-30 LAB
GLUCOSE, POC: 132 MG/DL
HBA1C MFR BLD: 7.3 %

## 2023-10-30 PROCEDURE — 99214 OFFICE O/P EST MOD 30 MIN: CPT | Performed by: FAMILY MEDICINE

## 2023-10-30 PROCEDURE — 83036 HEMOGLOBIN GLYCOSYLATED A1C: CPT | Performed by: FAMILY MEDICINE

## 2023-10-30 PROCEDURE — 3078F DIAST BP <80 MM HG: CPT | Performed by: FAMILY MEDICINE

## 2023-10-30 PROCEDURE — 3075F SYST BP GE 130 - 139MM HG: CPT | Performed by: FAMILY MEDICINE

## 2023-10-30 PROCEDURE — 90471 IMMUNIZATION ADMIN: CPT | Performed by: FAMILY MEDICINE

## 2023-10-30 PROCEDURE — 82962 GLUCOSE BLOOD TEST: CPT | Performed by: FAMILY MEDICINE

## 2023-10-30 PROCEDURE — 90674 CCIIV4 VAC NO PRSV 0.5 ML IM: CPT | Performed by: FAMILY MEDICINE

## 2023-10-30 RX ORDER — FEXOFENADINE HCL 180 MG/1
TABLET ORAL
Qty: 90 TABLET | Refills: 2 | Status: SHIPPED | OUTPATIENT
Start: 2023-10-30

## 2023-10-30 SDOH — ECONOMIC STABILITY: FOOD INSECURITY: WITHIN THE PAST 12 MONTHS, THE FOOD YOU BOUGHT JUST DIDN'T LAST AND YOU DIDN'T HAVE MONEY TO GET MORE.: NEVER TRUE

## 2023-10-30 SDOH — ECONOMIC STABILITY: INCOME INSECURITY: HOW HARD IS IT FOR YOU TO PAY FOR THE VERY BASICS LIKE FOOD, HOUSING, MEDICAL CARE, AND HEATING?: NOT HARD AT ALL

## 2023-10-30 SDOH — ECONOMIC STABILITY: FOOD INSECURITY: WITHIN THE PAST 12 MONTHS, YOU WORRIED THAT YOUR FOOD WOULD RUN OUT BEFORE YOU GOT MONEY TO BUY MORE.: NEVER TRUE

## 2023-10-30 ASSESSMENT — PATIENT HEALTH QUESTIONNAIRE - PHQ9
SUM OF ALL RESPONSES TO PHQ9 QUESTIONS 1 & 2: 0
1. LITTLE INTEREST OR PLEASURE IN DOING THINGS: 0
SUM OF ALL RESPONSES TO PHQ QUESTIONS 1-9: 0
2. FEELING DOWN, DEPRESSED OR HOPELESS: 0
SUM OF ALL RESPONSES TO PHQ QUESTIONS 1-9: 0

## 2023-10-30 ASSESSMENT — ENCOUNTER SYMPTOMS
CHEST TIGHTNESS: 0
RHINORRHEA: 0
COUGH: 0
SHORTNESS OF BREATH: 0
ABDOMINAL PAIN: 0
CONSTIPATION: 0
BLOOD IN STOOL: 0

## 2023-10-31 LAB
APPEARANCE UR: ABNORMAL
BACTERIA URNS QL MICRO: ABNORMAL /HPF
BILIRUB UR QL: NEGATIVE
COLOR UR: ABNORMAL
CREAT UR-MCNC: 138 MG/DL
EPITH CASTS URNS QL MICRO: ABNORMAL /LPF
GLUCOSE UR STRIP.AUTO-MCNC: NEGATIVE MG/DL
HGB UR QL STRIP: NEGATIVE
KETONES UR QL STRIP.AUTO: NEGATIVE MG/DL
LEUKOCYTE ESTERASE UR QL STRIP.AUTO: ABNORMAL
MICROALBUMIN UR-MCNC: 0.93 MG/DL
MICROALBUMIN/CREAT UR-RTO: 7 MG/G (ref 0–30)
NITRITE UR QL STRIP.AUTO: POSITIVE
PH UR STRIP: 5.5 (ref 5–8)
PROT UR STRIP-MCNC: NEGATIVE MG/DL
RBC #/AREA URNS HPF: ABNORMAL /HPF (ref 0–5)
SP GR UR REFRACTOMETRY: 1.01 (ref 1–1.03)
UROBILINOGEN UR QL STRIP.AUTO: 1 EU/DL (ref 0.2–1)
WBC URNS QL MICRO: ABNORMAL /HPF (ref 0–4)

## 2023-11-15 RX ORDER — INSULIN PMP CART,AUT,G6/7,CNTR
EACH SUBCUTANEOUS
Qty: 15 EACH | Refills: 5 | Status: SHIPPED | OUTPATIENT
Start: 2023-11-15

## 2023-11-15 RX ORDER — PROCHLORPERAZINE 25 MG/1
SUPPOSITORY RECTAL
Qty: 1 EACH | Refills: 3 | Status: SHIPPED | OUTPATIENT
Start: 2023-11-15

## 2023-11-15 NOTE — TELEPHONE ENCOUNTER
Last appointment: 10/30/23  Next appointment: 1/30/24  Previous refill encounter(s): 5/23/23 Dexcom #1 with 3 refills, 6/13/23 Omnipod #15 with 5 refills    Requested Prescriptions     Pending Prescriptions Disp Refills    Continuous Blood Gluc Transmit (DEXCOM G6 TRANSMITTER) MISC [Pharmacy Med Name: Judy Bernstein 1 each 3     Sig: USE TO MONITOR GLUCOSE CONTINUOUSLY ALONG WITH 7 [facility]. Insulin Disposable Pump (OMNIPOD 5 G6 POD, GEN 5,) MISC [Pharmacy Med Name: OMNIPOD 5 G6 PODS (GEN 5) 5PK] 15 each 5     Sig: INSERT ONE POD AS DIRECTED BASED ON INSULIN DOSE / USE, EVERY 2 DAYS AT A MAXIMUM. For Pharmacy Admin Tracking Only    Program: Medication Refill  CPA in place:    Recommendation Provided To:    Intervention Detail: New Rx: 2, reason: Patient Preference  Intervention Accepted By:   Juancarlos Veloz Closed?:    Time Spent (min): 5

## 2023-11-24 RX ORDER — IBUPROFEN 800 MG/1
TABLET ORAL
Qty: 90 TABLET | Refills: 1 | Status: SHIPPED | OUTPATIENT
Start: 2023-11-24

## 2024-01-23 RX ORDER — IBUPROFEN 800 MG/1
TABLET ORAL
Qty: 60 TABLET | Refills: 1 | Status: SHIPPED | OUTPATIENT
Start: 2024-01-23

## 2024-01-23 NOTE — TELEPHONE ENCOUNTER
Last appointment: 10/30/23  Next appointment: 1/30/24  Previous refill encounter(s): 11/24/23 #90 with 1 refill    Requested Prescriptions     Pending Prescriptions Disp Refills    ibuprofen (ADVIL;MOTRIN) 800 MG tablet [Pharmacy Med Name: IBUPROFEN 800 MG TABLET] 90 tablet 1     Sig: TAKE 1 TABLET BY MOUTH THREE TIMES A DAY AS NEEDED FOR PAIN         For Pharmacy Admin Tracking Only    Program: Medication Refill  CPA in place:    Recommendation Provided To:   Intervention Detail: New Rx: 1, reason: Patient Preference  Intervention Accepted By:   Gap Closed?:    Time Spent (min): 5

## 2024-01-30 ENCOUNTER — OFFICE VISIT (OUTPATIENT)
Age: 59
End: 2024-01-30
Payer: COMMERCIAL

## 2024-01-30 VITALS
DIASTOLIC BLOOD PRESSURE: 63 MMHG | HEART RATE: 88 BPM | BODY MASS INDEX: 29.11 KG/M2 | OXYGEN SATURATION: 98 % | RESPIRATION RATE: 16 BRPM | HEIGHT: 61 IN | SYSTOLIC BLOOD PRESSURE: 130 MMHG | WEIGHT: 154.2 LBS | TEMPERATURE: 98.4 F

## 2024-01-30 DIAGNOSIS — K21.9 GASTRO-ESOPHAGEAL REFLUX DISEASE WITHOUT ESOPHAGITIS: ICD-10-CM

## 2024-01-30 DIAGNOSIS — E10.319 TYPE 1 DIABETES MELLITUS WITH RETINOPATHY OF RIGHT EYE WITHOUT MACULAR EDEMA, UNSPECIFIED RETINOPATHY SEVERITY (HCC): Primary | ICD-10-CM

## 2024-01-30 DIAGNOSIS — I10 ESSENTIAL (PRIMARY) HYPERTENSION: ICD-10-CM

## 2024-01-30 DIAGNOSIS — E78.2 MIXED HYPERLIPIDEMIA: ICD-10-CM

## 2024-01-30 DIAGNOSIS — Z79.899 ENCOUNTER FOR LONG-TERM (CURRENT) USE OF MEDICATIONS: ICD-10-CM

## 2024-01-30 DIAGNOSIS — Z79.4 LONG TERM (CURRENT) USE OF INSULIN (HCC): ICD-10-CM

## 2024-01-30 DIAGNOSIS — Z96.41 INSULIN PUMP IN PLACE: ICD-10-CM

## 2024-01-30 LAB
ALBUMIN SERPL-MCNC: 3.5 G/DL (ref 3.5–5)
ALBUMIN/GLOB SERPL: 1.3 (ref 1.1–2.2)
ALP SERPL-CCNC: 93 U/L (ref 45–117)
ALT SERPL-CCNC: 23 U/L (ref 12–78)
ANION GAP SERPL CALC-SCNC: 3 MMOL/L (ref 5–15)
AST SERPL-CCNC: 10 U/L (ref 15–37)
BILIRUB SERPL-MCNC: 0.4 MG/DL (ref 0.2–1)
BUN SERPL-MCNC: 14 MG/DL (ref 6–20)
BUN/CREAT SERPL: 14 (ref 12–20)
CALCIUM SERPL-MCNC: 8.7 MG/DL (ref 8.5–10.1)
CHLORIDE SERPL-SCNC: 111 MMOL/L (ref 97–108)
CHOLEST SERPL-MCNC: 133 MG/DL
CO2 SERPL-SCNC: 28 MMOL/L (ref 21–32)
CREAT SERPL-MCNC: 0.97 MG/DL (ref 0.55–1.02)
ERYTHROCYTE [DISTWIDTH] IN BLOOD BY AUTOMATED COUNT: 13.3 % (ref 11.5–14.5)
GLOBULIN SER CALC-MCNC: 2.6 G/DL (ref 2–4)
GLUCOSE SERPL-MCNC: 153 MG/DL (ref 65–100)
GLUCOSE, POC: 166 MG/DL
HBA1C MFR BLD: 6.7 %
HCT VFR BLD AUTO: 41.6 % (ref 35–47)
HDLC SERPL-MCNC: 63 MG/DL
HDLC SERPL: 2.1 (ref 0–5)
HGB BLD-MCNC: 12.6 G/DL (ref 11.5–16)
LDLC SERPL CALC-MCNC: 60.4 MG/DL (ref 0–100)
MCH RBC QN AUTO: 26.4 PG (ref 26–34)
MCHC RBC AUTO-ENTMCNC: 30.3 G/DL (ref 30–36.5)
MCV RBC AUTO: 87 FL (ref 80–99)
NRBC # BLD: 0 K/UL (ref 0–0.01)
NRBC BLD-RTO: 0 PER 100 WBC
PLATELET # BLD AUTO: 252 K/UL (ref 150–400)
PMV BLD AUTO: 12 FL (ref 8.9–12.9)
POTASSIUM SERPL-SCNC: 4.4 MMOL/L (ref 3.5–5.1)
PROT SERPL-MCNC: 6.1 G/DL (ref 6.4–8.2)
RBC # BLD AUTO: 4.78 M/UL (ref 3.8–5.2)
SODIUM SERPL-SCNC: 142 MMOL/L (ref 136–145)
TRIGL SERPL-MCNC: 48 MG/DL
TSH SERPL DL<=0.05 MIU/L-ACNC: 1.67 UIU/ML (ref 0.36–3.74)
VLDLC SERPL CALC-MCNC: 9.6 MG/DL
WBC # BLD AUTO: 5.6 K/UL (ref 3.6–11)

## 2024-01-30 PROCEDURE — 82962 GLUCOSE BLOOD TEST: CPT | Performed by: FAMILY MEDICINE

## 2024-01-30 PROCEDURE — 3078F DIAST BP <80 MM HG: CPT | Performed by: FAMILY MEDICINE

## 2024-01-30 PROCEDURE — 99214 OFFICE O/P EST MOD 30 MIN: CPT | Performed by: FAMILY MEDICINE

## 2024-01-30 PROCEDURE — 83036 HEMOGLOBIN GLYCOSYLATED A1C: CPT | Performed by: FAMILY MEDICINE

## 2024-01-30 PROCEDURE — 3075F SYST BP GE 130 - 139MM HG: CPT | Performed by: FAMILY MEDICINE

## 2024-01-30 RX ORDER — ACYCLOVIR 400 MG/1
TABLET ORAL
Qty: 1 EACH | Refills: 0 | Status: SHIPPED | OUTPATIENT
Start: 2024-01-30

## 2024-01-30 RX ORDER — ACYCLOVIR 400 MG/1
TABLET ORAL
Qty: 10 EACH | Refills: 3 | Status: SHIPPED | OUTPATIENT
Start: 2024-01-30

## 2024-01-30 SDOH — ECONOMIC STABILITY: INCOME INSECURITY: HOW HARD IS IT FOR YOU TO PAY FOR THE VERY BASICS LIKE FOOD, HOUSING, MEDICAL CARE, AND HEATING?: NOT HARD AT ALL

## 2024-01-30 SDOH — ECONOMIC STABILITY: FOOD INSECURITY: WITHIN THE PAST 12 MONTHS, THE FOOD YOU BOUGHT JUST DIDN'T LAST AND YOU DIDN'T HAVE MONEY TO GET MORE.: NEVER TRUE

## 2024-01-30 SDOH — ECONOMIC STABILITY: FOOD INSECURITY: WITHIN THE PAST 12 MONTHS, YOU WORRIED THAT YOUR FOOD WOULD RUN OUT BEFORE YOU GOT MONEY TO BUY MORE.: NEVER TRUE

## 2024-01-30 ASSESSMENT — ENCOUNTER SYMPTOMS
BLOOD IN STOOL: 0
ABDOMINAL PAIN: 0
CONSTIPATION: 0
RHINORRHEA: 0
COUGH: 0
SHORTNESS OF BREATH: 0
CHEST TIGHTNESS: 0

## 2024-01-30 ASSESSMENT — PATIENT HEALTH QUESTIONNAIRE - PHQ9
1. LITTLE INTEREST OR PLEASURE IN DOING THINGS: 0
SUM OF ALL RESPONSES TO PHQ QUESTIONS 1-9: 0
2. FEELING DOWN, DEPRESSED OR HOPELESS: 0
SUM OF ALL RESPONSES TO PHQ9 QUESTIONS 1 & 2: 0
SUM OF ALL RESPONSES TO PHQ QUESTIONS 1-9: 0

## 2024-01-30 NOTE — PROGRESS NOTES
Chief Complaint   Patient presents with    3 Month Follow-Up     For diabetes.       \"Have you been to the ER, urgent care clinic since your last visit?  Hospitalized since your last visit?\"    NO    “Have you seen or consulted any other health care providers outside of [facility] since your last visit?”    NO             Vitals:    24 0810   BP: 130/63   Pulse: 88   Resp: 16   Temp: 98.4 °F (36.9 °C)   SpO2: 98%       Health Maintenance Due   Topic Date Due    Pneumococcal 0-64 years Vaccine (2 - PCV) 2018    COVID-19 Vaccine ( season) 2023    Diabetic foot exam  2024        The patient, Monica Lim, identity was verified by name and .   
week, as physiologic means to achieve blood pressure control.  Medication compliance advised.   -     TSH; Future  -     TSH    Mixed hyperlipidemia  Continue to monitor.  Work on diet and exercise.  -     Lipid Panel; Future  -     Lipid Panel    Gastro-esophageal reflux disease without esophagitis  Stable on protonix    Long term (current) use of insulin (HCC)  Encounter for long-term (current) use of medications  -     Comprehensive Metabolic Panel; Future  -     CBC; Future  -     CBC  -     Comprehensive Metabolic Panel      Return in about 4 months (around 5/30/2024).  current treatment plan is effective, no change in therapy  reviewed diet, exercise and weight control  cardiovascular risk and specific lipid/LDL goals reviewed  reviewed medications and side effects in detail  specific diabetic recommendations: low cholesterol diet, weight control and daily exercise discussed, all medications, side effects and compliance discussed carefully, foot care discussed and Podiatry visits discussed, annual eye examinations at Ophthalmology discussed, and glycohemoglobin and other lab monitoring discussed      I have discussed diagnosis listed in this note with pt and/or family. I have discussed treatment plans and options and the risk/benefit analysis of those options, including safe use of medications and possible medication side effects.   Through the use of shared decision making we have agreed to the above plan. The patient has received an after-visit summary and questions were answered concerning future plans and follow up.  Advise pt of any urgent changes then to proceed to the ER.            Kely Diaz MD  
no...

## 2024-01-31 RX ORDER — INSULIN ASPART 100 [IU]/ML
INJECTION, SOLUTION INTRAVENOUS; SUBCUTANEOUS
Qty: 150 ML | Refills: 3 | Status: SHIPPED | OUTPATIENT
Start: 2024-01-31

## 2024-01-31 NOTE — TELEPHONE ENCOUNTER
Last appointment: 1/30/24  Next appointment: 5/31/24  Previous refill encounter(s): 1/2/23    Requested Prescriptions     Pending Prescriptions Disp Refills    NOVOLOG 100 UNIT/ML injection vial [Pharmacy Med Name: NOVOLOG 100 UNIT/ML VIAL] 150 mL 3     Sig: USE WITH INSULIN PUMP FOR TOTAL DAILY DOSE  UNITS AS DIRECTED.         For Pharmacy Admin Tracking Only    Program: Medication Refill  CPA in place:    Recommendation Provided To:   Intervention Detail: New Rx: 1, reason: Patient Preference  Intervention Accepted By:   Gap Closed?:    Time Spent (min): 5

## 2024-02-09 ENCOUNTER — CLINICAL DOCUMENTATION (OUTPATIENT)
Age: 59
End: 2024-02-09

## 2024-02-09 DIAGNOSIS — I10 ESSENTIAL (PRIMARY) HYPERTENSION: ICD-10-CM

## 2024-02-09 RX ORDER — LISINOPRIL 40 MG/1
40 TABLET ORAL DAILY
Qty: 90 TABLET | Refills: 3 | Status: SHIPPED | OUTPATIENT
Start: 2024-02-09

## 2024-02-09 NOTE — TELEPHONE ENCOUNTER
Last appointment: 1/30/24  Next appointment: 5/31/24  Previous refill encounter(s): 2/28/23    Requested Prescriptions     Pending Prescriptions Disp Refills    lisinopril (PRINIVIL;ZESTRIL) 40 MG tablet [Pharmacy Med Name: LISINOPRIL 40 MG TABLET] 90 tablet 3     Sig: TAKE 1 TABLET BY MOUTH EVERY DAY         For Pharmacy Admin Tracking Only    Program: Medication Refill  CPA in place:    Recommendation Provided To:   Intervention Detail: New Rx: 1, reason: Patient Preference  Intervention Accepted By:   Gap Closed?:    Time Spent (min): 5

## 2024-02-11 DIAGNOSIS — J20.9 ACUTE BRONCHITIS, UNSPECIFIED: ICD-10-CM

## 2024-02-13 RX ORDER — ALBUTEROL SULFATE 90 UG/1
AEROSOL, METERED RESPIRATORY (INHALATION)
Qty: 6.7 G | Refills: 5 | Status: SHIPPED | OUTPATIENT
Start: 2024-02-13

## 2024-02-13 NOTE — TELEPHONE ENCOUNTER
Last appointment: 1/30/24  Next appointment: 5/31/24  Previous refill encounter(s): 8/23/23 #1 with 5 refills    Requested Prescriptions     Pending Prescriptions Disp Refills    albuterol sulfate HFA (PROVENTIL;VENTOLIN;PROAIR) 108 (90 Base) MCG/ACT inhaler [Pharmacy Med Name: ALBUTEROL HFA (PROVENTIL) INH] 6.7 g 5     Sig: INHALE 2 PUFFS BY MOUTH EVERY 4 HOURS AS NEEDED FOR WHEEZE OR FOR SHORTNESS OF BREATH         For Pharmacy Admin Tracking Only    Program: Medication Refill  CPA in place:    Recommendation Provided To:   Intervention Detail: New Rx: 1, reason: Patient Preference  Intervention Accepted By:   Gap Closed?:    Time Spent (min): 5

## 2024-02-15 ENCOUNTER — CLINICAL DOCUMENTATION (OUTPATIENT)
Age: 59
End: 2024-02-15

## 2024-02-16 ENCOUNTER — OFFICE VISIT (OUTPATIENT)
Age: 59
End: 2024-02-16

## 2024-02-16 VITALS
RESPIRATION RATE: 18 BRPM | BODY MASS INDEX: 28.81 KG/M2 | DIASTOLIC BLOOD PRESSURE: 69 MMHG | HEART RATE: 76 BPM | WEIGHT: 152.6 LBS | OXYGEN SATURATION: 100 % | HEIGHT: 61 IN | SYSTOLIC BLOOD PRESSURE: 138 MMHG | TEMPERATURE: 98.3 F

## 2024-02-16 DIAGNOSIS — Z23 IMMUNIZATION DUE: Primary | ICD-10-CM

## 2024-02-16 NOTE — PROGRESS NOTES
Chief Complaint   Patient presents with    Immunizations     covid       \"Have you been to the ER, urgent care clinic since your last visit?  Hospitalized since your last visit?\"    NO    “Have you seen or consulted any other health care providers outside of [facility] since your last visit?”    NO         Per orders of Dr. Bueno, injection of COVID-19 was given in the Right deltoid . Patient tolerated it well. Patient instructed to report any adverse reaction to me immediately.    Vitals:    24 1134   BP: 138/69   Pulse:    Resp:    Temp:    SpO2:        Health Maintenance Due   Topic Date Due    Diabetic foot exam  2024        The patient, Monica Lim, identity was verified by name and .    [facility]  [address]  [place] [address]  Phone:  [phone]  Fax:  [phone]                                    Wellington Moreno   MRN: [id number]    Department:  [facility]   Date of Visit:  3/20/2020           After Visit Summary Signature Page    I have received my discharge instructions, and my questions have been answered. I have discussed any challenges I see with this plan with the nurse or doctor.    ..........................................................................................................................................  Patient/Patient Representative Signature      ..........................................................................................................................................  Patient Representative Print Name and Relationship to Patient    ..................................................               ................................................  Date                                   Time    ..........................................................................................................................................  Reviewed by Signature/Title    ...................................................              ..............................................  Date                                               Time          22EPIC Rev 08/18

## 2024-03-07 ENCOUNTER — CLINICAL DOCUMENTATION (OUTPATIENT)
Age: 59
End: 2024-03-07

## 2024-03-12 DIAGNOSIS — K21.9 GASTRO-ESOPHAGEAL REFLUX DISEASE WITHOUT ESOPHAGITIS: ICD-10-CM

## 2024-03-13 RX ORDER — PANTOPRAZOLE SODIUM 40 MG/1
40 TABLET, DELAYED RELEASE ORAL DAILY
Qty: 90 TABLET | Refills: 3 | Status: SHIPPED | OUTPATIENT
Start: 2024-03-13

## 2024-03-13 NOTE — TELEPHONE ENCOUNTER
Last appointment: 1/30/24  Next appointment: 5/31/24  Previous refill encounter(s): 2/28/23    Requested Prescriptions     Pending Prescriptions Disp Refills    pantoprazole (PROTONIX) 40 MG tablet [Pharmacy Med Name: PANTOPRAZOLE SOD DR 40 MG TAB] 90 tablet 3     Sig: TAKE 1 TABLET BY MOUTH EVERY DAY         For Pharmacy Admin Tracking Only    Program: Medication Refill  CPA in place:    Recommendation Provided To:   Intervention Detail: New Rx: 1, reason: Patient Preference  Intervention Accepted By:   Gap Closed?:    Time Spent (min): 5

## 2024-03-27 RX ORDER — SEMAGLUTIDE 1 MG/.5ML
INJECTION, SOLUTION SUBCUTANEOUS
Qty: 6 ML | Refills: 3 | Status: SHIPPED | OUTPATIENT
Start: 2024-03-27

## 2024-03-27 NOTE — TELEPHONE ENCOUNTER
Last appointment: 1/30/24  Next appointment: 5/31/24  Previous refill encounter(s): 9/29/23 #9 with 2 refills    Requested Prescriptions     Pending Prescriptions Disp Refills    WEGOVY 1 MG/0.5ML SOAJ SC injection [Pharmacy Med Name: WEGOVY 1 MG/0.5 ML PEN] 6 mL 3     Sig: INJECT 1 MG BY SUBCUTANEOUS ROUTE EVERY 7 DAYS.         For Pharmacy Admin Tracking Only    Program: Medication Refill  CPA in place:    Recommendation Provided To:   Intervention Detail: New Rx: 1, reason: Patient Preference  Intervention Accepted By:   Gap Closed?:    Time Spent (min): 5

## 2024-04-08 RX ORDER — IBUPROFEN 800 MG/1
TABLET ORAL
Qty: 60 TABLET | Refills: 1 | Status: SHIPPED | OUTPATIENT
Start: 2024-04-08

## 2024-05-06 DIAGNOSIS — Z91.09 ENVIRONMENTAL ALLERGIES: ICD-10-CM

## 2024-05-06 NOTE — TELEPHONE ENCOUNTER
Last appointment: 2/16/24  Next appointment: 5/31/24  Previous refill encounter(s): 10/30/23 #90 with 2 refills    Requested Prescriptions     Pending Prescriptions Disp Refills    fexofenadine (ALLEGRA) 180 MG tablet [Pharmacy Med Name: FEXOFENADINE  MG TABLET] 90 tablet 2     Sig: TAKE 1 TABLET BY MOUTH DAILY AS NEEDED FOR ALLERGIES OR RHINITIS.         For Pharmacy Admin Tracking Only    Program: Medication Refill  CPA in place:    Recommendation Provided To:   Intervention Detail: New Rx: 1, reason: Patient Preference  Intervention Accepted By:   Gap Closed?:    Time Spent (min): 5

## 2024-05-07 RX ORDER — FEXOFENADINE HCL 180 MG/1
TABLET ORAL
Qty: 90 TABLET | Refills: 2 | Status: SHIPPED | OUTPATIENT
Start: 2024-05-07

## 2024-05-31 ENCOUNTER — OFFICE VISIT (OUTPATIENT)
Age: 59
End: 2024-05-31
Payer: COMMERCIAL

## 2024-05-31 VITALS
HEIGHT: 61 IN | HEART RATE: 84 BPM | BODY MASS INDEX: 28.47 KG/M2 | WEIGHT: 150.8 LBS | RESPIRATION RATE: 18 BRPM | OXYGEN SATURATION: 99 % | DIASTOLIC BLOOD PRESSURE: 73 MMHG | SYSTOLIC BLOOD PRESSURE: 144 MMHG | TEMPERATURE: 98.9 F

## 2024-05-31 DIAGNOSIS — Z12.31 ENCOUNTER FOR SCREENING MAMMOGRAM FOR BREAST CANCER: ICD-10-CM

## 2024-05-31 DIAGNOSIS — Z79.4 LONG TERM (CURRENT) USE OF INSULIN (HCC): ICD-10-CM

## 2024-05-31 DIAGNOSIS — E78.2 MIXED HYPERLIPIDEMIA: ICD-10-CM

## 2024-05-31 DIAGNOSIS — Z96.41 INSULIN PUMP IN PLACE: ICD-10-CM

## 2024-05-31 DIAGNOSIS — Z79.899 ENCOUNTER FOR LONG-TERM (CURRENT) USE OF MEDICATIONS: ICD-10-CM

## 2024-05-31 DIAGNOSIS — E10.319 TYPE 1 DIABETES MELLITUS WITH RETINOPATHY OF RIGHT EYE WITHOUT MACULAR EDEMA, UNSPECIFIED RETINOPATHY SEVERITY (HCC): ICD-10-CM

## 2024-05-31 DIAGNOSIS — I10 ESSENTIAL (PRIMARY) HYPERTENSION: Primary | ICD-10-CM

## 2024-05-31 LAB
GLUCOSE, POC: 152 MG/DL
HBA1C MFR BLD: 6.9 %

## 2024-05-31 PROCEDURE — 3077F SYST BP >= 140 MM HG: CPT | Performed by: FAMILY MEDICINE

## 2024-05-31 PROCEDURE — 3078F DIAST BP <80 MM HG: CPT | Performed by: FAMILY MEDICINE

## 2024-05-31 PROCEDURE — 99214 OFFICE O/P EST MOD 30 MIN: CPT | Performed by: FAMILY MEDICINE

## 2024-05-31 PROCEDURE — 83036 HEMOGLOBIN GLYCOSYLATED A1C: CPT | Performed by: FAMILY MEDICINE

## 2024-05-31 PROCEDURE — 82962 GLUCOSE BLOOD TEST: CPT | Performed by: FAMILY MEDICINE

## 2024-05-31 RX ORDER — INSULIN PMP CART,AUT,G6/7,CNTR
EACH SUBCUTANEOUS
Qty: 15 EACH | Refills: 5 | Status: SHIPPED | OUTPATIENT
Start: 2024-05-31

## 2024-05-31 RX ORDER — ACYCLOVIR 400 MG/1
TABLET ORAL
Qty: 10 EACH | Refills: 3 | Status: SHIPPED | OUTPATIENT
Start: 2024-05-31

## 2024-05-31 RX ORDER — PROCHLORPERAZINE 25 MG/1
SUPPOSITORY RECTAL
Qty: 1 EACH | Refills: 3 | Status: SHIPPED | OUTPATIENT
Start: 2024-05-31

## 2024-05-31 ASSESSMENT — PATIENT HEALTH QUESTIONNAIRE - PHQ9
2. FEELING DOWN, DEPRESSED OR HOPELESS: NOT AT ALL
SUM OF ALL RESPONSES TO PHQ QUESTIONS 1-9: 0
SUM OF ALL RESPONSES TO PHQ9 QUESTIONS 1 & 2: 0
SUM OF ALL RESPONSES TO PHQ QUESTIONS 1-9: 0
1. LITTLE INTEREST OR PLEASURE IN DOING THINGS: NOT AT ALL
SUM OF ALL RESPONSES TO PHQ QUESTIONS 1-9: 0
SUM OF ALL RESPONSES TO PHQ QUESTIONS 1-9: 0

## 2024-05-31 ASSESSMENT — ENCOUNTER SYMPTOMS
BLOOD IN STOOL: 0
ABDOMINAL PAIN: 0
SHORTNESS OF BREATH: 0
CHEST TIGHTNESS: 0
COUGH: 0
CONSTIPATION: 0
RHINORRHEA: 0

## 2024-05-31 NOTE — PROGRESS NOTES
Chief Complaint   Patient presents with    Follow-up     Patient is here today for a 4 month follow-up.       \"Have you been to the ER, urgent care clinic since your last visit?  Hospitalized since your last visit?\"    NO    “Have you seen or consulted any other health care providers outside of [facility] since your last visit?”    NO       Vitals:    24 0755 24 0802   BP: (!) 141/74 (!) 144/73   Site: Left Upper Arm Right Upper Arm   Position: Sitting Sitting   Cuff Size: Large Adult Large Adult   Pulse: 84    Resp: 18    Temp: 98.9 °F (37.2 °C)    TempSrc: Oral    SpO2: 99%    Weight: 68.4 kg (150 lb 12.8 oz)    Height: 1.549 m (5' 1\")           Vitals:    24 0802   BP: (!) 144/73   Pulse:    Resp:    Temp:    SpO2:        Health Maintenance Due   Topic Date Due    Diabetic foot exam  2024        The patient, Monica Lim, identity was verified by name and .   
activity change.   HENT:  Negative for congestion and rhinorrhea.    Respiratory:  Negative for cough, chest tightness and shortness of breath.    Cardiovascular:  Negative for chest pain, palpitations and leg swelling.   Gastrointestinal:  Negative for abdominal pain, blood in stool and constipation.   Endocrine: Negative for polyuria.   Genitourinary:  Negative for difficulty urinating, frequency, hematuria and urgency.   Musculoskeletal:  Negative for arthralgias, joint swelling and myalgias.   Skin:  Negative for rash.   Allergic/Immunologic: Negative for environmental allergies.   Neurological:  Negative for dizziness, light-headedness and headaches.   Psychiatric/Behavioral:  Negative for dysphoric mood and sleep disturbance. The patient is not nervous/anxious.        Objective:   Physical Exam  Constitutional:       Appearance: Normal appearance.      Comments: BP (!) 144/73 (Site: Right Upper Arm, Position: Sitting, Cuff Size: Large Adult)   Pulse 84   Temp 98.9 °F (37.2 °C) (Oral)   Resp 18   Ht 1.549 m (5' 1\")   Wt 68.4 kg (150 lb 12.8 oz)   SpO2 99%   BMI 28.49 kg/m²    HENT:      Right Ear: Tympanic membrane normal.      Left Ear: Tympanic membrane normal.      Nose: No rhinorrhea.      Mouth/Throat:      Mouth: Mucous membranes are moist.   Cardiovascular:      Rate and Rhythm: Normal rate and regular rhythm.   Pulmonary:      Effort: Pulmonary effort is normal.      Breath sounds: Normal breath sounds.   Musculoskeletal:      Cervical back: Normal range of motion.      Right lower leg: No edema.      Left lower leg: No edema.   Lymphadenopathy:      Cervical: No cervical adenopathy.   Neurological:      General: No focal deficit present.      Mental Status: She is alert and oriented to person, place, and time.     Diabetic foot exam:   Left Foot:   Visual Exam: normal   Pulse DP: 2+ (normal)   Filament test: normal sensation   Vibratory Sensation: normal  Right Foot:   Visual Exam:

## 2024-06-17 ENCOUNTER — PHARMACY VISIT (OUTPATIENT)
Age: 59
End: 2024-06-17

## 2024-06-17 ENCOUNTER — ENROLLMENT (OUTPATIENT)
Age: 59
End: 2024-06-17

## 2024-06-17 DIAGNOSIS — E10.319 TYPE 1 DIABETES MELLITUS WITH RETINOPATHY OF RIGHT EYE WITHOUT MACULAR EDEMA, UNSPECIFIED RETINOPATHY SEVERITY (HCC): Primary | ICD-10-CM

## 2024-06-17 NOTE — PATIENT INSTRUCTIONS
Your Visit Summary:     Plan:  - Continue current regimen. Keep an eye on overnight readings - if seeing high spikes, let's consider increasing rate from 0.5 to 0.75. Keep an eye on early morning readings as well.   - I will check on Dexcom costs to see if we can get it cheaper for you  - I will review your blood sugars in a few weeks to see how those adjustments are working for you           Call me with any questions or concerns  Daryl Torres [phone]    Check and document your blood sugar first thing in the morning (fasting), 1-2 hours after a meal, and/or before bedtime.   Bring your meter/log to all future visits.     Your blood sugar goals:  - Fasting (first thing in the morning)  blood sugar: 80 - 130   - 1 to 2 hours after a meal: less than 180     When you experience symptoms of low blood sugar (example: less than 70):  - Confirm low reading by checking your blood sugar.   - Then treat with 15 grams of carbohydrates (one-half cup of juice or regular soda, or 4-5 glucose tablets).  - Wait 15 minutes to recheck blood sugar.   - Then eat a protein containing meal/snack to prevent another low blood sugar episode. (example: peanut butter + crackers)    Nutrition:  - When reviewing a nutrition label, focus on the serving size, total calories, fat (and type of fats), total carbohydrates, sugar (and amount of added sugar), amount of fiber (good for your digestive), and amount of protein.  Refer to your nutrition label guide for more information.  - For a meal : max 45 grams of carbohydrates  - For a snack: max 15 grams of carbohydrates  - Reduce amount of saturated and trans fat.  Consider more unsaturated fat options as they are better for your heart health.   - Have at least 1 serving of lean fat protein with each meal.    - Increase fiber intake slowly to prevent constipation.   - Substitute fruit juices for the whole fruit    Low carb snack ideas (15 grams total carb or less):    String cheese or babybel

## 2024-06-17 NOTE — PROGRESS NOTES
Pharmacy Progress Note - Diabetes Management    S/O: Ms. Monica Lim is a 58 y.o. female with a PMH of T1DM since age 24, diabetic retinopathy, GERD, HTN and HLD. Patient was referred by Kely Diaz MD for diabetes management and was seen today for an office visit.    Patient's last A1c was 6.9% on 5/31/24, which is stable from previous A1c of 6.7% on 1/30/24.     Assessment and Plan    Diabetes Management:  A1c is at goal of less than 7% per ADA guidelines   Current CGM trend has trended a little higher over the past few weeks per review of Dexcom data today, with a TIR of 55%.   She was having lows overnight, and decreased her basal rate from 12a - 4a to 0.5 units/h. That has worked well to decrease lows, but she is now noticing some higher readings in the morning when wakes up (in mid-upper 100s)  Discussed increasing her basal rate from 4a - 8a from 1 unit/hr to 1.25 units/hr to see if that helps a little with her morning spikes. Will review virtually again in a couple weeks and continue to adjust if needed.  Patient may also need a little more basal insulin during the day to help with daytime highs, but will address after correcting morning highs  Instructed patient to continue with Omnipod pump and Wegovy 1 mg once weekly   Recommend to check blood sugars using Dexcom G6 device as instructed. Hopefully, she can be transitioned to the G7 soon once it is able to connect with her Omnipod   Will also see if there are any coupons that patient can use to help with Dexcom costs (currently paying about $200 per month)  Bring glucometer/log/CGM reader to all future visits      Resources provided: Dexcom G6 transmitter and 1 sensor     Follow up: 9/30/24 (Office visit) or sooner as needed         Interim update: Patient last seen by previous PharmD on 9/27/23. Patient initially started out using Medtronic insulin pump, then transitioned to Omnipod a few years ago after being frustrated with the tubing

## 2024-06-21 ENCOUNTER — PATIENT MESSAGE (OUTPATIENT)
Age: 59
End: 2024-06-21

## 2024-06-24 ENCOUNTER — OFFICE VISIT (OUTPATIENT)
Age: 59
End: 2024-06-24

## 2024-06-24 DIAGNOSIS — E78.2 MIXED HYPERLIPIDEMIA: Primary | ICD-10-CM

## 2024-06-24 DIAGNOSIS — Z79.899 ENCOUNTER FOR LONG-TERM (CURRENT) USE OF MEDICATIONS: ICD-10-CM

## 2024-06-24 LAB
ALBUMIN SERPL-MCNC: 3.6 G/DL (ref 3.5–5)
ALBUMIN/GLOB SERPL: 1.2 (ref 1.1–2.2)
ALP SERPL-CCNC: 96 U/L (ref 45–117)
ALT SERPL-CCNC: 31 U/L (ref 12–78)
ANION GAP SERPL CALC-SCNC: 3 MMOL/L (ref 5–15)
AST SERPL-CCNC: 18 U/L (ref 15–37)
BILIRUB SERPL-MCNC: 0.6 MG/DL (ref 0.2–1)
BUN SERPL-MCNC: 19 MG/DL (ref 6–20)
BUN/CREAT SERPL: 20 (ref 12–20)
CALCIUM SERPL-MCNC: 9.5 MG/DL (ref 8.5–10.1)
CHLORIDE SERPL-SCNC: 110 MMOL/L (ref 97–108)
CHOLEST SERPL-MCNC: 143 MG/DL
CO2 SERPL-SCNC: 29 MMOL/L (ref 21–32)
CREAT SERPL-MCNC: 0.95 MG/DL (ref 0.55–1.02)
GLOBULIN SER CALC-MCNC: 3.1 G/DL (ref 2–4)
GLUCOSE SERPL-MCNC: 128 MG/DL (ref 65–100)
HDLC SERPL-MCNC: 69 MG/DL
HDLC SERPL: 2.1 (ref 0–5)
LDLC SERPL CALC-MCNC: 62 MG/DL (ref 0–100)
POTASSIUM SERPL-SCNC: 4.4 MMOL/L (ref 3.5–5.1)
PROT SERPL-MCNC: 6.7 G/DL (ref 6.4–8.2)
SODIUM SERPL-SCNC: 142 MMOL/L (ref 136–145)
TRIGL SERPL-MCNC: 60 MG/DL
VLDLC SERPL CALC-MCNC: 12 MG/DL

## 2024-07-06 ENCOUNTER — PATIENT MESSAGE (OUTPATIENT)
Age: 59
End: 2024-07-06

## 2024-07-08 ENCOUNTER — PHARMACY VISIT (OUTPATIENT)
Age: 59
End: 2024-07-08

## 2024-07-08 DIAGNOSIS — E10.319 TYPE 1 DIABETES MELLITUS WITH RETINOPATHY OF RIGHT EYE WITHOUT MACULAR EDEMA, UNSPECIFIED RETINOPATHY SEVERITY (HCC): Primary | ICD-10-CM

## 2024-07-08 RX ORDER — SEMAGLUTIDE 0.68 MG/ML
INJECTION, SOLUTION SUBCUTANEOUS
Qty: 6 ML | Refills: 0 | Status: SHIPPED | COMMUNITY
Start: 2024-07-08

## 2024-07-08 NOTE — PROGRESS NOTES
Pharmacy Progress Notes - Sample Medication     Patient provided with sample medication of: Ozempic 0.5 mg. Patient has [employer] commercial insurance and will be using samples in the interim while Wegovy is on backorder.     Confirmed medication, dosage and instructions with patient and/or nurse. Patient was provided with 2 box(es) of medication for a 28 day supply. Sample was entered in Epic, logged, and signed out per policy.     Orders Placed This Encounter    Semaglutide,0.25 or 0.5MG/DOS, (OZEMPIC, 0.25 OR 0.5 MG/DOSE,) 2 MG/3ML SOPN     Sig: Inject 1 mg underneath the skin every 7 days. Give two 0.5 mg injections to equal 1 mg.     Dispense:  6 mL     Refill:  0     Order Specific Question:   Expiration Date     Answer:   7/31/2025     Order Specific Question:   Lot#     Answer:   [id number]     Order Specific Question:        Answer:   Anamaria Nordisk     Order Specific Question:   NDC#     Answer:   [id number]         Thank you,  Daryl Torres, PHARMD, BCPS  Clinical Pharmacist   [facility]   [phone]        For Pharmacy Admin Tracking Only    Program: Medical Group  CPA in place:  Yes  Recommendation Provided To: Patient/Caregiver: 1 via In person  Intervention Detail: Patient Access Assistance/Sample Provided  Intervention Accepted By: Patient/Caregiver: 1  Gap Closed?: No   Time Spent (min): 15

## 2024-07-09 DIAGNOSIS — E53.8 DEFICIENCY OF OTHER SPECIFIED B GROUP VITAMINS: Primary | ICD-10-CM

## 2024-07-09 RX ORDER — CYANOCOBALAMIN 1000 UG/ML
INJECTION, SOLUTION INTRAMUSCULAR; SUBCUTANEOUS
Qty: 3 ML | Refills: 18 | Status: SHIPPED | OUTPATIENT
Start: 2024-07-09

## 2024-07-17 RX ORDER — EVOLOCUMAB 140 MG/ML
140 INJECTION, SOLUTION SUBCUTANEOUS
Qty: 6 ML | Refills: 3 | Status: SHIPPED | OUTPATIENT
Start: 2024-07-17

## 2024-07-17 NOTE — TELEPHONE ENCOUNTER
Last appointment: 6/24/24  Next appointment: 9/30/24  Previous refill encounter(s): 7/11/23    Requested Prescriptions     Pending Prescriptions Disp Refills    REPATHA SURECLICK 140 MG/ML SOAJ [Pharmacy Med Name: REPATHA 140 MG/ML SURECLICK] 6 mL 3     Sig: INJECT 140 MG INTO THE SKIN EVERY 14 DAYS         For Pharmacy Admin Tracking Only    Program: Medication Refill  CPA in place:    Recommendation Provided To:   Intervention Detail: New Rx: 1, reason: Patient Preference  Intervention Accepted By:   Gap Closed?:    Time Spent (min): 5

## 2024-07-30 RX ORDER — SEMAGLUTIDE 1.7 MG/.75ML
1.7 INJECTION, SOLUTION SUBCUTANEOUS
Refills: 3 | OUTPATIENT
Start: 2024-07-30

## 2024-08-27 RX ORDER — IBUPROFEN 800 MG/1
TABLET, FILM COATED ORAL
Qty: 60 TABLET | Refills: 1 | Status: SHIPPED | OUTPATIENT
Start: 2024-08-27

## 2024-08-27 NOTE — TELEPHONE ENCOUNTER
Last appointment: 5/31/24  Next appointment: 9/30/24  Previous refill encounter(s): 4/8/24 #60 with 1 refill    Requested Prescriptions     Pending Prescriptions Disp Refills    ibuprofen (ADVIL;MOTRIN) 800 MG tablet [Pharmacy Med Name: IBUPROFEN 800 MG TABLET] 60 tablet 1     Sig: TAKE 1 TABLET BY MOUTH THREE TIMES A DAY AS NEEDED FOR PAIN         For Pharmacy Admin Tracking Only    Program: Medication Refill  CPA in place:    Recommendation Provided To:   Intervention Detail: New Rx: 1, reason: Patient Preference  Intervention Accepted By:   Gap Closed?:    Time Spent (min): 5

## 2024-09-17 RX ORDER — SEMAGLUTIDE 1.7 MG/.75ML
1.7 INJECTION, SOLUTION SUBCUTANEOUS
Refills: 3 | OUTPATIENT
Start: 2024-09-17

## 2024-09-17 RX ORDER — SEMAGLUTIDE 1 MG/.5ML
INJECTION, SOLUTION SUBCUTANEOUS
Qty: 6 ML | Refills: 3 | Status: SHIPPED | OUTPATIENT
Start: 2024-09-17

## 2024-09-17 RX ORDER — PROCHLORPERAZINE 25 MG/1
SUPPOSITORY RECTAL
Qty: 9 EACH | Refills: 3 | Status: SHIPPED | OUTPATIENT
Start: 2024-09-17

## 2024-09-17 NOTE — TELEPHONE ENCOUNTER
Duplicate    For Pharmacy Admin Tracking Only    Program: Medication Refill  CPA in place:    Recommendation Provided To:   Intervention Detail: Discontinued Rx: 1, reason: Duplicate Therapy  Intervention Accepted By:   Gap Closed?:    Time Spent (min): 5   Methotrexate Pregnancy And Lactation Text: This medication is Pregnancy Category X and is known to cause fetal harm. This medication is excreted in breast milk.

## 2024-09-24 DIAGNOSIS — E10.319 TYPE 1 DIABETES MELLITUS WITH RETINOPATHY OF RIGHT EYE WITHOUT MACULAR EDEMA, UNSPECIFIED RETINOPATHY SEVERITY (HCC): ICD-10-CM

## 2024-09-24 RX ORDER — PROCHLORPERAZINE 25 MG/1
SUPPOSITORY RECTAL
Qty: 9 EACH | Refills: 3 | Status: SHIPPED | OUTPATIENT
Start: 2024-09-24

## 2024-09-30 ENCOUNTER — PHARMACY VISIT (OUTPATIENT)
Age: 59
End: 2024-09-30

## 2024-09-30 ENCOUNTER — OFFICE VISIT (OUTPATIENT)
Age: 59
End: 2024-09-30
Payer: COMMERCIAL

## 2024-09-30 VITALS
SYSTOLIC BLOOD PRESSURE: 136 MMHG | WEIGHT: 149.4 LBS | TEMPERATURE: 99.1 F | HEIGHT: 61 IN | BODY MASS INDEX: 28.21 KG/M2 | RESPIRATION RATE: 18 BRPM | HEART RATE: 76 BPM | DIASTOLIC BLOOD PRESSURE: 78 MMHG | OXYGEN SATURATION: 99 %

## 2024-09-30 DIAGNOSIS — E10.319 TYPE 1 DIABETES MELLITUS WITH RETINOPATHY OF RIGHT EYE WITHOUT MACULAR EDEMA, UNSPECIFIED RETINOPATHY SEVERITY (HCC): Primary | ICD-10-CM

## 2024-09-30 DIAGNOSIS — I10 ESSENTIAL (PRIMARY) HYPERTENSION: ICD-10-CM

## 2024-09-30 DIAGNOSIS — E78.2 MIXED HYPERLIPIDEMIA: ICD-10-CM

## 2024-09-30 DIAGNOSIS — Z96.41 INSULIN PUMP IN PLACE: ICD-10-CM

## 2024-09-30 DIAGNOSIS — Z79.4 LONG TERM (CURRENT) USE OF INSULIN (HCC): ICD-10-CM

## 2024-09-30 DIAGNOSIS — Z79.899 ENCOUNTER FOR LONG-TERM (CURRENT) USE OF MEDICATIONS: ICD-10-CM

## 2024-09-30 LAB
GLUCOSE, POC: 140 MG/DL
HBA1C MFR BLD: 7 %

## 2024-09-30 PROCEDURE — 3075F SYST BP GE 130 - 139MM HG: CPT | Performed by: FAMILY MEDICINE

## 2024-09-30 PROCEDURE — 90480 ADMN SARSCOV2 VAC 1/ONLY CMP: CPT | Performed by: FAMILY MEDICINE

## 2024-09-30 PROCEDURE — 3078F DIAST BP <80 MM HG: CPT | Performed by: FAMILY MEDICINE

## 2024-09-30 PROCEDURE — 90471 IMMUNIZATION ADMIN: CPT | Performed by: FAMILY MEDICINE

## 2024-09-30 PROCEDURE — 99214 OFFICE O/P EST MOD 30 MIN: CPT | Performed by: FAMILY MEDICINE

## 2024-09-30 PROCEDURE — 90661 CCIIV3 VAC ABX FR 0.5 ML IM: CPT | Performed by: FAMILY MEDICINE

## 2024-09-30 PROCEDURE — 82962 GLUCOSE BLOOD TEST: CPT | Performed by: FAMILY MEDICINE

## 2024-09-30 PROCEDURE — 91320 SARSCV2 VAC 30MCG TRS-SUC IM: CPT | Performed by: FAMILY MEDICINE

## 2024-09-30 PROCEDURE — 83036 HEMOGLOBIN GLYCOSYLATED A1C: CPT | Performed by: FAMILY MEDICINE

## 2024-09-30 ASSESSMENT — PATIENT HEALTH QUESTIONNAIRE - PHQ9
SUM OF ALL RESPONSES TO PHQ QUESTIONS 1-9: 0
1. LITTLE INTEREST OR PLEASURE IN DOING THINGS: NOT AT ALL
SUM OF ALL RESPONSES TO PHQ QUESTIONS 1-9: 0
SUM OF ALL RESPONSES TO PHQ9 QUESTIONS 1 & 2: 0
SUM OF ALL RESPONSES TO PHQ QUESTIONS 1-9: 0
SUM OF ALL RESPONSES TO PHQ QUESTIONS 1-9: 0
2. FEELING DOWN, DEPRESSED OR HOPELESS: NOT AT ALL

## 2024-09-30 ASSESSMENT — ENCOUNTER SYMPTOMS
CONSTIPATION: 0
ABDOMINAL PAIN: 0
SHORTNESS OF BREATH: 0
CHEST TIGHTNESS: 0
COUGH: 0
BLOOD IN STOOL: 0
RHINORRHEA: 0

## 2024-09-30 NOTE — PROGRESS NOTES
Pharmacy Progress Note - Diabetes Management    S/O: Ms. Monica Lim is a 58 y.o. female with a PMH of T1DM since age 24, diabetic retinopathy, GERD, HTN and HLD. Patient was referred by Kely Diaz MD for diabetes management and was seen today for an office visit.  Patient's last A1c was 7.0% on 9/30/24, which is stable from previous A1c of 6.9% on 5/31/24.     Assessment and Plan    Diabetes Management:  A1c is near goal of less than 7% per ADA guidelines   Current CGM trend is right at goal per review of Dexcom data today, with a TIR of 70%  Patient has been making a few adjustments to her basal rate and has fixed the lows she's been having overnight by reducing her rate to 0.75 units/hr from 6 PM to 8 AM  She does have a few spikes during the day around lunch 12-4p and dinner 7-9p  Discussed reading nutrition labels and being a little more diligent with the amount she boluses with her meals. She states she has been eyeballing how much to bolus since she has been doing it for so long, but she will start paying more attention to the actual amount of carbs she is consuming  Will also increase her basal rate from 2 PM to 4 PM up to 2 units/hr to provide more coverage for her lunch which is when she is spiking the highest during the day. She expressed understanding.   Discussed increasing her basal rate from 4a - 8a from 1 unit/hr to 1.25 units/hr to see if that helps a little with Will review virtually again in a couple weeks and continue to adjust if needed  Instructed patient to continue with Omnipod pump and Wegovy 1 mg once weekly   Recommend to check blood sugars using Dexcom G6 device as instructed  Bring glucometer/log/CGM reader to all future visits      Resources provided: Dexcom G6 transmitter and 2 sensors      Follow up: As needed          Interim update: Patient acknowledges being stressed with work lately and may be eating more inconsistently. However, there have been no other

## 2024-09-30 NOTE — PROGRESS NOTES
Chief Complaint   Patient presents with    Follow-up     Patient is here today for a 4 month follow-up       \"Have you been to the ER, urgent care clinic since your last visit?  Hospitalized since your last visit?\"    NO    “Have you seen or consulted any other health care providers outside of [facility] since your last visit?”    NO      Vitals:    24 0942 24 0947   BP: (!) 159/71 (!) 162/82   Site: Left Upper Arm Right Upper Arm   Position: Sitting Sitting   Cuff Size: Large Adult Large Adult   Pulse: 76    Resp: 18    Temp: 99.1 °F (37.3 °C)    TempSrc: Oral    SpO2: 99%    Weight: 67.8 kg (149 lb 6.4 oz)    Height: 1.549 m (5' 1\")         Per verbal orders of Dr. Bueno, injection of influenza and COVID vaccine was given in the Left deltoid . Patient tolerated it well. Patient instructed to report any adverse reaction to me immediately.    FLU LOT: [id number]  EXP: 2025  NDC: 21533-192-27  No LMP recorded. Patient has had a hysterectomy.:     Click Here for Release of Records Request  Covid LOT: [id number]  EXP: 2025  NDC: 4403-2676-32    There were no vitals filed for this visit.    Health Maintenance Due   Topic Date Due    Hepatitis B vaccine (1 of 3 - 19+ 3-dose series) Never done    Flu vaccine (1) 2024    Diabetic Alb to Cr ratio (uACR) test  10/30/2024        The patient, Monica Lim, identity was verified by name and .   
weight control  cardiovascular risk and specific lipid/LDL goals reviewed  reviewed medications and side effects in detail  specific diabetic recommendations: low cholesterol diet, weight control and daily exercise discussed, all medications, side effects and compliance discussed carefully, foot care discussed and Podiatry visits discussed, annual eye examinations at Ophthalmology discussed, and glycohemoglobin and other lab monitoring discussed      I have discussed diagnosis listed in this note with pt and/or family. I have discussed treatment plans and options and the risk/benefit analysis of those options, including safe use of medications and possible medication side effects.   Through the use of shared decision making we have agreed to the above plan. The patient has received an after-visit summary and questions were answered concerning future plans and follow up.  Advise pt of any urgent changes then to proceed to the ER.            Kely Diaz MD

## 2024-11-15 RX ORDER — SEMAGLUTIDE 0.5 MG/.5ML
INJECTION, SOLUTION SUBCUTANEOUS
Refills: 2 | OUTPATIENT
Start: 2024-11-15

## 2024-11-15 RX ORDER — INSULIN ASPART 100 [IU]/ML
INJECTION, SOLUTION INTRAVENOUS; SUBCUTANEOUS
Qty: 150 ML | Refills: 3 | Status: SHIPPED | OUTPATIENT
Start: 2024-11-15

## 2024-11-15 NOTE — TELEPHONE ENCOUNTER
Wegovy 1mg was sent on 9/27/24 for #6ml with 3 refills.    Last appointment: 9/30/24  Next appointment: 1/31/25  Previous refill encounter(s): 1/31/24 Novolog #150ml with 3 refills    Requested Prescriptions     Pending Prescriptions Disp Refills    NOVOLOG 100 UNIT/ML injection vial [Pharmacy Med Name: NOVOLOG 100 UNIT/ML VIAL] 150 mL 3     Sig: USE WITH INSULIN PUMP FOR TOTAL DAILY DOSE  UNITS AS DIRECTED.     Refused Prescriptions Disp Refills    WEGOVY 0.5 MG/0.5ML SOAJ SC injection [Pharmacy Med Name: WEGOVY 0.5 MG/0.5 ML PEN]  2     Sig: INJECT 0.5 MG SUBCUTANEOUSLY EVERY 7 DAYS. USE THIS DOSE UNTIL THE 1 MG DOSE BECOMES AVAILABLE.         For Pharmacy Admin Tracking Only    Program: Medication Refill  CPA in place:    Recommendation Provided To:   Intervention Detail: Discontinued Rx: 1, reason: Duplicate Therapy and New Rx: 1, reason: Patient Preference  Intervention Accepted By:   Gap Closed?:    Time Spent (min): 5

## 2024-11-27 ENCOUNTER — HOSPITAL ENCOUNTER (OUTPATIENT)
Facility: HOSPITAL | Age: 59
Discharge: HOME | End: 2024-11-30
Attending: FAMILY MEDICINE
Payer: COMMERCIAL

## 2024-11-27 VITALS — BODY MASS INDEX: 27.23 KG/M2 | HEIGHT: 62 IN | WEIGHT: 148 LBS

## 2024-11-27 DIAGNOSIS — Z12.31 ENCOUNTER FOR SCREENING MAMMOGRAM FOR BREAST CANCER: ICD-10-CM

## 2024-11-27 PROCEDURE — 77063 BREAST TOMOSYNTHESIS BI: CPT

## 2025-01-12 DIAGNOSIS — I10 ESSENTIAL (PRIMARY) HYPERTENSION: ICD-10-CM

## 2025-01-13 RX ORDER — IBUPROFEN 800 MG/1
TABLET, FILM COATED ORAL
Qty: 60 TABLET | Refills: 1 | Status: SHIPPED | OUTPATIENT
Start: 2025-01-13

## 2025-01-13 RX ORDER — LISINOPRIL 40 MG/1
40 TABLET ORAL DAILY
Qty: 90 TABLET | Refills: 3 | Status: SHIPPED | OUTPATIENT
Start: 2025-01-13

## 2025-01-13 NOTE — TELEPHONE ENCOUNTER
Last appointment: 9/30/24  Next appointment: 1/31/25  Previous refill encounter(s): 2/9/24 Prinivil, 8/27/24 Motrin #60 with 1 refill    Requested Prescriptions     Pending Prescriptions Disp Refills    lisinopril (PRINIVIL;ZESTRIL) 40 MG tablet [Pharmacy Med Name: LISINOPRIL 40 MG TABLET] 90 tablet 3     Sig: TAKE 1 TABLET BY MOUTH EVERY DAY    ibuprofen (ADVIL;MOTRIN) 800 MG tablet [Pharmacy Med Name: IBUPROFEN 800 MG TABLET] 60 tablet 1     Sig: TAKE 1 TABLET BY MOUTH THREE TIMES A DAY AS NEEDED FOR PAIN         For Pharmacy Admin Tracking Only    Program: Medication Refill  CPA in place:    Recommendation Provided To:   Intervention Detail: New Rx: 1, reason: Patient Preference  Intervention Accepted By:   Gap Closed?:    Time Spent (min): 5

## 2025-01-31 ENCOUNTER — OFFICE VISIT (OUTPATIENT)
Age: 60
End: 2025-01-31
Payer: COMMERCIAL

## 2025-01-31 VITALS
DIASTOLIC BLOOD PRESSURE: 70 MMHG | TEMPERATURE: 97.7 F | WEIGHT: 153 LBS | HEART RATE: 86 BPM | BODY MASS INDEX: 27.98 KG/M2 | RESPIRATION RATE: 18 BRPM | SYSTOLIC BLOOD PRESSURE: 133 MMHG

## 2025-01-31 DIAGNOSIS — L72.3 SEBACEOUS CYST OF LEFT AXILLA: ICD-10-CM

## 2025-01-31 DIAGNOSIS — Z79.899 ENCOUNTER FOR LONG-TERM (CURRENT) USE OF MEDICATIONS: ICD-10-CM

## 2025-01-31 DIAGNOSIS — I10 ESSENTIAL (PRIMARY) HYPERTENSION: Primary | ICD-10-CM

## 2025-01-31 DIAGNOSIS — Z96.41 INSULIN PUMP IN PLACE: ICD-10-CM

## 2025-01-31 DIAGNOSIS — E10.319 TYPE 1 DIABETES MELLITUS WITH RETINOPATHY OF RIGHT EYE WITHOUT MACULAR EDEMA, UNSPECIFIED RETINOPATHY SEVERITY (HCC): ICD-10-CM

## 2025-01-31 DIAGNOSIS — E78.2 MIXED HYPERLIPIDEMIA: ICD-10-CM

## 2025-01-31 DIAGNOSIS — Z79.4 LONG TERM (CURRENT) USE OF INSULIN (HCC): ICD-10-CM

## 2025-01-31 LAB
ALBUMIN SERPL-MCNC: 3.4 G/DL (ref 3.5–5)
ALBUMIN/GLOB SERPL: 1.1 (ref 1.1–2.2)
ALP SERPL-CCNC: 94 U/L (ref 45–117)
ALT SERPL-CCNC: 35 U/L (ref 12–78)
ANION GAP SERPL CALC-SCNC: 7 MMOL/L (ref 2–12)
AST SERPL-CCNC: 23 U/L (ref 15–37)
BILIRUB SERPL-MCNC: 0.5 MG/DL (ref 0.2–1)
BUN SERPL-MCNC: 15 MG/DL (ref 6–20)
BUN/CREAT SERPL: 16 (ref 12–20)
CALCIUM SERPL-MCNC: 9.1 MG/DL (ref 8.5–10.1)
CHLORIDE SERPL-SCNC: 108 MMOL/L (ref 97–108)
CHOLEST SERPL-MCNC: 144 MG/DL
CO2 SERPL-SCNC: 27 MMOL/L (ref 21–32)
CREAT SERPL-MCNC: 0.93 MG/DL (ref 0.55–1.02)
CREAT UR-MCNC: 141 MG/DL
ERYTHROCYTE [DISTWIDTH] IN BLOOD BY AUTOMATED COUNT: 13.2 % (ref 11.5–14.5)
GLOBULIN SER CALC-MCNC: 3 G/DL (ref 2–4)
GLUCOSE SERPL-MCNC: 112 MG/DL (ref 65–100)
GLUCOSE, POC: 141 MG/DL
HBA1C MFR BLD: 7 %
HCT VFR BLD AUTO: 39.6 % (ref 35–47)
HDLC SERPL-MCNC: 69 MG/DL
HDLC SERPL: 2.1 (ref 0–5)
HGB BLD-MCNC: 12 G/DL (ref 11.5–16)
LDLC SERPL CALC-MCNC: 65.6 MG/DL (ref 0–100)
MCH RBC QN AUTO: 26.6 PG (ref 26–34)
MCHC RBC AUTO-ENTMCNC: 30.3 G/DL (ref 30–36.5)
MCV RBC AUTO: 87.8 FL (ref 80–99)
MICROALBUMIN UR-MCNC: 0.57 MG/DL
MICROALBUMIN/CREAT UR-RTO: 4 MG/G (ref 0–30)
NRBC # BLD: 0 K/UL (ref 0–0.01)
NRBC BLD-RTO: 0 PER 100 WBC
PLATELET # BLD AUTO: 219 K/UL (ref 150–400)
PMV BLD AUTO: 11.6 FL (ref 8.9–12.9)
POTASSIUM SERPL-SCNC: 3.8 MMOL/L (ref 3.5–5.1)
PROT SERPL-MCNC: 6.4 G/DL (ref 6.4–8.2)
RBC # BLD AUTO: 4.51 M/UL (ref 3.8–5.2)
SODIUM SERPL-SCNC: 142 MMOL/L (ref 136–145)
TRIGL SERPL-MCNC: 47 MG/DL
VLDLC SERPL CALC-MCNC: 9.4 MG/DL
WBC # BLD AUTO: 5.9 K/UL (ref 3.6–11)

## 2025-01-31 PROCEDURE — 82962 GLUCOSE BLOOD TEST: CPT | Performed by: FAMILY MEDICINE

## 2025-01-31 PROCEDURE — 99214 OFFICE O/P EST MOD 30 MIN: CPT | Performed by: FAMILY MEDICINE

## 2025-01-31 PROCEDURE — 83036 HEMOGLOBIN GLYCOSYLATED A1C: CPT | Performed by: FAMILY MEDICINE

## 2025-01-31 PROCEDURE — 3078F DIAST BP <80 MM HG: CPT | Performed by: FAMILY MEDICINE

## 2025-01-31 PROCEDURE — 3075F SYST BP GE 130 - 139MM HG: CPT | Performed by: FAMILY MEDICINE

## 2025-01-31 RX ORDER — AMLODIPINE BESYLATE 5 MG/1
10 TABLET ORAL DAILY
Qty: 90 TABLET | Refills: 3 | Status: SHIPPED | OUTPATIENT
Start: 2025-01-31

## 2025-01-31 RX ORDER — CEPHALEXIN 500 MG/1
500 CAPSULE ORAL 3 TIMES DAILY
Qty: 30 CAPSULE | Refills: 0 | Status: SHIPPED | OUTPATIENT
Start: 2025-01-31 | End: 2025-02-10

## 2025-01-31 SDOH — ECONOMIC STABILITY: FOOD INSECURITY: WITHIN THE PAST 12 MONTHS, YOU WORRIED THAT YOUR FOOD WOULD RUN OUT BEFORE YOU GOT MONEY TO BUY MORE.: PATIENT DECLINED

## 2025-01-31 SDOH — ECONOMIC STABILITY: TRANSPORTATION INSECURITY
IN THE PAST 12 MONTHS, HAS LACK OF TRANSPORTATION KEPT YOU FROM MEETINGS, WORK, OR FROM GETTING THINGS NEEDED FOR DAILY LIVING?: PATIENT DECLINED

## 2025-01-31 SDOH — ECONOMIC STABILITY: TRANSPORTATION INSECURITY
IN THE PAST 12 MONTHS, HAS THE LACK OF TRANSPORTATION KEPT YOU FROM MEDICAL APPOINTMENTS OR FROM GETTING MEDICATIONS?: PATIENT DECLINED

## 2025-01-31 SDOH — ECONOMIC STABILITY: FOOD INSECURITY: WITHIN THE PAST 12 MONTHS, THE FOOD YOU BOUGHT JUST DIDN'T LAST AND YOU DIDN'T HAVE MONEY TO GET MORE.: PATIENT DECLINED

## 2025-01-31 SDOH — ECONOMIC STABILITY: INCOME INSECURITY: IN THE LAST 12 MONTHS, WAS THERE A TIME WHEN YOU WERE NOT ABLE TO PAY THE MORTGAGE OR RENT ON TIME?: PATIENT DECLINED

## 2025-01-31 ASSESSMENT — PATIENT HEALTH QUESTIONNAIRE - PHQ9
2. FEELING DOWN, DEPRESSED OR HOPELESS: NOT AT ALL
SUM OF ALL RESPONSES TO PHQ QUESTIONS 1-9: 0
1. LITTLE INTEREST OR PLEASURE IN DOING THINGS: NOT AT ALL
SUM OF ALL RESPONSES TO PHQ QUESTIONS 1-9: 0
SUM OF ALL RESPONSES TO PHQ9 QUESTIONS 1 & 2: 0

## 2025-01-31 ASSESSMENT — ENCOUNTER SYMPTOMS
SHORTNESS OF BREATH: 0
RHINORRHEA: 0
ABDOMINAL PAIN: 0
CHEST TIGHTNESS: 0
COUGH: 0
CONSTIPATION: 0
BLOOD IN STOOL: 0

## 2025-01-31 NOTE — PROGRESS NOTES
Subjective:      Patient ID: Monica Lim is a 59 y.o. female.    HPI  Follow up on chronic medical problems.  Overall has been feeling well.    C/o boil under the left underarm over the past few days.  Has some soreness,  seem to reoccur every fe months.  No drainage.  Does not shave underarm area.    Cardiovascular Review:  The patient has hypertension and hyperlipidemia.  Diet and Lifestyle: generally follows a low fat low cholesterol diet, generally follows a low sodium diet, exercises sporadically, nonsmoker  Home BP Monitoring: is not measured at home.  Pertinent ROS: taking medications as instructed, no medication side effects noted, no TIA's, no chest pain on exertion, no dyspnea on exertion, no swelling of ankles, no palpitations, no muscle aches or pain.   Diabetes Mellitus:  She has diabetes mellitus type 1.5 dx at the age of 24.  She has retinopathy.  Recently started on ozempic and BS have improved overall and has helped with weight loss.  Weight has been stable from her last visit.    She is adjusting well to her insulin pump(has the omnipod).  BS had been improved and ranging in the low to mid 100s. No low BS noted.  She is adjusting the insulin with her pump and consulting with Daryl.      Diabetic ROS - medication compliance: compliant most of the time, diabetic diet compliance: compliant most of the time, home glucose monitoring: is performed regularly with the [employer] jessika system.  She has not been exercising recently.      Further diabetic ROS: no chest pain, dyspnea or TIA's, no numbness, has some  tingling in the extremities which has been stable.  Vision has been stable.   HM:  Mammo: 11/27/2024  Colonoscopy: 8/21/2019.  Repeat in 10 years.     Past Medical History:   Diagnosis Date    Diabetes (HCC)     Diabetic retinopathy (HCC)     right eye     Gastroesophageal reflux disease without esophagitis 05/26/2016    Gestational diabetes     Hypercholesterolemia     Hypertension     Type 1

## 2025-02-04 ENCOUNTER — PATIENT MESSAGE (OUTPATIENT)
Age: 60
End: 2025-02-04

## 2025-02-06 ENCOUNTER — TELEPHONE (OUTPATIENT)
Age: 60
End: 2025-02-06

## 2025-02-06 ENCOUNTER — CLINICAL DOCUMENTATION (OUTPATIENT)
Age: 60
End: 2025-02-06

## 2025-02-06 RX ORDER — INSULIN LISPRO 100 [IU]/ML
INJECTION, SOLUTION INTRAVENOUS; SUBCUTANEOUS
Qty: 150 ML | Refills: 5 | Status: SHIPPED | OUTPATIENT
Start: 2025-02-06

## 2025-02-06 NOTE — TELEPHONE ENCOUNTER
Letter faxed today to the [facility] [place] to Dr. Eder Spring at [phone] to fax the pt most recent eye exam.  Spoke to the pt and she had to cancel her appointment.

## 2025-02-06 NOTE — TELEPHONE ENCOUNTER
----- Message from Dr. Kely Diaz MD sent at 1/31/2025  8:47 AM EST -----  Please send for her last eye exam with Dr. Spring,

## 2025-02-08 DIAGNOSIS — K21.9 GASTRO-ESOPHAGEAL REFLUX DISEASE WITHOUT ESOPHAGITIS: ICD-10-CM

## 2025-02-11 RX ORDER — PANTOPRAZOLE SODIUM 40 MG/1
40 TABLET, DELAYED RELEASE ORAL DAILY
Qty: 90 TABLET | Refills: 3 | Status: SHIPPED | OUTPATIENT
Start: 2025-02-11

## 2025-02-11 NOTE — TELEPHONE ENCOUNTER
Last appointment: 1/31/25  Next appointment: 5/30/25  Previous refill encounter(s): 3/13/24 #90 with 3 refills    Requested Prescriptions     Pending Prescriptions Disp Refills    pantoprazole (PROTONIX) 40 MG tablet [Pharmacy Med Name: PANTOPRAZOLE SOD DR 40 MG TAB] 90 tablet 3     Sig: TAKE 1 TABLET BY MOUTH EVERY DAY         For Pharmacy Admin Tracking Only    Program: Medication Refill  CPA in place:    Recommendation Provided To:   Intervention Detail: New Rx: 1, reason: Patient Preference  Intervention Accepted By:   Gap Closed?:    Time Spent (min): 5

## 2025-03-13 NOTE — TELEPHONE ENCOUNTER
Last appointment: 1/31/25  Next appointment: 5/30/25  Previous refill encounter(s): 9/17/24 #6ml with 3 refills    Requested Prescriptions     Pending Prescriptions Disp Refills    Semaglutide-Weight Management (WEGOVY) 1 MG/0.5ML SOAJ SC injection [Pharmacy Med Name: WEGOVY 1 MG/0.5 ML PEN] 6 mL 3     Sig: INJECT 1 MG BY SUBCUTANEOUS ROUTE EVERY 7 DAYS.         For Pharmacy Admin Tracking Only    Program: Medication Refill  CPA in place:    Recommendation Provided To:   Intervention Detail: New Rx: 1, reason: Patient Preference  Intervention Accepted By:   Gap Closed?:    Time Spent (min): 5

## 2025-03-14 RX ORDER — SEMAGLUTIDE 1 MG/.5ML
INJECTION, SOLUTION SUBCUTANEOUS
Qty: 6 ML | Refills: 3 | Status: SHIPPED | OUTPATIENT
Start: 2025-03-14

## 2025-03-19 RX ORDER — INSULIN PMP CART,AUT,G6/7,CNTR
EACH SUBCUTANEOUS
Qty: 15 EACH | Refills: 5 | Status: SHIPPED | OUTPATIENT
Start: 2025-03-19

## 2025-03-19 NOTE — TELEPHONE ENCOUNTER
Last appointment: 1/31/25  Next appointment: 5/30/25  Previous refill encounter(s): 5/31/24 #15 with 5 refills    Requested Prescriptions     Pending Prescriptions Disp Refills    Insulin Disposable Pump (OMNIPOD 5 [id number] PODS GEN 5) MISC [Pharmacy Med Name: OMNIPOD 5 [id number] PODS (GEN 5)] 15 each 5     Sig: AS DIRECTED.         For Pharmacy Admin Tracking Only    Program: Medication Refill  CPA in place:    Recommendation Provided To:   Intervention Detail: New Rx: 1, reason: Patient Preference  Intervention Accepted By:   Gap Closed?:    Time Spent (min): 5

## 2025-04-02 RX ORDER — IBUPROFEN 800 MG/1
TABLET, FILM COATED ORAL
Qty: 60 TABLET | Refills: 1 | Status: SHIPPED | OUTPATIENT
Start: 2025-04-02

## 2025-04-02 NOTE — TELEPHONE ENCOUNTER
Last appointment: 1/31/25  Next appointment: 5/30/25  Previous refill encounter(s): 1/13/25 #60 with 1 refill    Requested Prescriptions     Pending Prescriptions Disp Refills    ibuprofen (ADVIL;MOTRIN) 800 MG tablet [Pharmacy Med Name: IBUPROFEN 800 MG TABLET] 60 tablet 1     Sig: TAKE 1 TABLET BY MOUTH THREE TIMES A DAY AS NEEDED FOR PAIN         For Pharmacy Admin Tracking Only    Program: Medication Refill  CPA in place:    Recommendation Provided To:   Intervention Detail: New Rx: 1, reason: Patient Preference  Intervention Accepted By:   Gap Closed?:    Time Spent (min): 5

## 2025-04-06 DIAGNOSIS — J20.9 ACUTE BRONCHITIS, UNSPECIFIED: ICD-10-CM

## 2025-04-07 RX ORDER — ALBUTEROL SULFATE 90 UG/1
INHALANT RESPIRATORY (INHALATION)
Qty: 6.7 EACH | Refills: 5 | Status: SHIPPED | OUTPATIENT
Start: 2025-04-07

## 2025-04-07 NOTE — TELEPHONE ENCOUNTER
Last appointment: 1/31/25  Next appointment: 5/30/25  Previous refill encounter(s): 2/13/24 #1 with 5 refills    Requested Prescriptions     Pending Prescriptions Disp Refills    albuterol sulfate HFA (PROVENTIL;VENTOLIN;PROAIR) 108 (90 Base) MCG/ACT inhaler [Pharmacy Med Name: ALBUTEROL HFA (PROVENTIL) INH] 6.7 each 5     Sig: INHALE 2 PUFFS BY MOUTH EVERY 4 HOURS AS NEEDED FOR WHEEZE OR FOR SHORTNESS OF BREATH         For Pharmacy Admin Tracking Only    Program: Medication Refill  CPA in place:    Recommendation Provided To:   Intervention Detail: New Rx: 1, reason: Patient Preference  Intervention Accepted By:   Gap Closed?:    Time Spent (min): 5

## 2025-04-14 ENCOUNTER — TELEPHONE (OUTPATIENT)
Age: 60
End: 2025-04-14

## 2025-04-14 NOTE — TELEPHONE ENCOUNTER
**[facility]**    Attempt made to contact patient at the mobile number.    Left a message requesting a call back at [phone] to schedule the appointment    Stefany Andrade   Ambulatory Pharmacy Technician Clinical   [phone]  Department, toll free: [phone], option 2

## 2025-04-15 NOTE — TELEPHONE ENCOUNTER
**Patient is to be scheduled with the [facility]**    Incoming Call    Spoke to patient at mobile number and advised them of the previous message.    Patient verified understanding and scheduled a in person appointment .  Appointment scheduled for 4/21/25 at 10:30 am.    Stefany Luna [facility]   Ambulatory Pharmacy Technician Clinical   [phone]  Department, toll free: [phone], option 2       For Pharmacy Admin Tracking Only    Program: Medical Group  Recommendation Provided To: Patient/Caregiver: 2 via Telephone  Intervention Detail: Scheduled Appointment  Intervention Accepted By: Patient/Caregiver: 2  Gap Closed?: Yes   Time Spent (min): 10

## 2025-04-17 ENCOUNTER — TELEPHONE (OUTPATIENT)
Age: 60
End: 2025-04-17

## 2025-04-17 ENCOUNTER — PATIENT MESSAGE (OUTPATIENT)
Age: 60
End: 2025-04-17

## 2025-04-17 DIAGNOSIS — Z13.820 ENCOUNTER FOR SCREENING FOR OSTEOPOROSIS: Primary | ICD-10-CM

## 2025-04-17 DIAGNOSIS — Z78.0 POSTMENOPAUSE: ICD-10-CM

## 2025-04-18 NOTE — TELEPHONE ENCOUNTER
For Pharmacy Admin Tracking Only    Program: Medical Group  CPA in place:  Yes  Recommendation Provided To: Other: 1  Intervention Detail: Benefit Assistance  Intervention Accepted By: Other: 1  Gap Closed?: No   Time Spent (min): 10

## 2025-04-21 ENCOUNTER — PHARMACY VISIT (OUTPATIENT)
Age: 60
End: 2025-04-21

## 2025-04-21 DIAGNOSIS — E13.9 DIABETES MELLITUS TYPE 1.5 (HCC): Primary | ICD-10-CM

## 2025-04-21 NOTE — PROGRESS NOTES
Pharmacy Progress Note - Diabetes Management    S/O: Ms. Monica Lim is a 59 y.o. female with a PMH of T1.5 DM since age 24, diabetic retinopathy, GERD, HTN and HLD. Patient was referred by Kely Diaz MD for diabetes management and was seen today for an office visit.  Patient's last A1c was 7.0% on 1/31/25, which is stable from previous A1c of 7.0% on 9/30/24.     Assessment and Plan    Diabetes Management:  A1c is near goal of less than 7% per ADA guidelines   Current CGM trend is a bit below goal per review of Dexcom data today, with a TIR of 52% over the past 14 days  Reviewed Omnipod and Dexcom data - patient is having higher spikes (often over 250) after meals. Will adjust her insulin-carb ratio from 1:6 to 1:8 to see if this will give her a little more support with her mealtime bolus dose. May need to continue adjusting; will monitor closely. Encouraged her to continue carb-counting which will assist with given the right amount of insulin with her meals. She expressed understanding.   Also adjusted patient's Omnipod Target/Correct above glucose from 130 to 120 mg/dL to give her tighter control at baseline.  Instructed patient to continue with Omnipod pump and Wegovy 1 mg once weekly. Advised patient that PA for Wegovy was denied; so will need to take a look at GLP-1a coverage options (will aim for Mounjaro). Plan to follow up with patient once more information obtained.    Recommend to check blood sugars using Dexcom G6 device as instructed  Bring glucometer/log/CGM reader to all future visits    Follow up: As needed        Interim update: Patient states that her blood sugars have been up and down recently. She attributes some of this to taking allergy medicine which can sometimes impact blood sugar. Otherwise, she has not noticed a huge change in her diet/appetite level. She is also wondering if the effects of Wegovy have worn off some      Current anti-hyperglycemic regimen include(s):

## 2025-04-21 NOTE — PATIENT INSTRUCTIONS
Your Visit Summary:     Plan:  - I will check on Mounjaro coverage and follow up with you   - Will be watching your blood sugars closely. I'll have our Omnipod , Barbi, take a look at your data as well         Call me with any questions or concerns  Daryl Torres [phone], select option 2    Check and document your blood sugar first thing in the morning (fasting), 1-2 hours after a meal, and/or before bedtime.   Bring your meter/log to all future visits.     Your blood sugar goals:  - Fasting (first thing in the morning)  blood sugar: 80 - 130   - 1 to 2 hours after a meal: less than 180   - CGM Target Range: 70 - 180 greater than 70% of the time    When you experience symptoms of low blood sugar (example: less than 70):  - Confirm low reading by checking your blood sugar.   - Then treat with 15 grams of carbohydrates (one-half cup of juice or regular soda, or 4-5 glucose tablets).  - Wait 15 minutes to recheck blood sugar.   - Then eat a protein containing meal/snack to prevent another low blood sugar episode. (example: peanut butter + crackers)    Nutrition:  - When reviewing a nutrition label, focus on the serving size, total calories, fat (and type of fats), total carbohydrates, sugar (and amount of added sugar), amount of fiber (good for your digestive), and amount of protein.  Refer to your nutrition label guide for more information.  - For a meal : max 30-45 grams of carbohydrates  - For a snack: max 15 grams of carbohydrates  - Reduce amount of saturated and trans fat.  Consider more unsaturated fat options as they are better for your heart health.   - Have at least 1 serving of lean fat protein with each meal.    - Increase fiber intake slowly to prevent constipation.   - Substitute fruit juices for the whole fruit    Low carb snack ideas (15 grams total carb or less):    String cheese or babybel with 6 crackers  4 peanut butter crackers  3 cups of popcorn  1 cup raw vegetables with hummus or

## 2025-04-22 ENCOUNTER — PATIENT MESSAGE (OUTPATIENT)
Age: 60
End: 2025-04-22

## 2025-04-22 ENCOUNTER — TELEPHONE (OUTPATIENT)
Age: 60
End: 2025-04-22

## 2025-04-22 DIAGNOSIS — E13.9 DIABETES MELLITUS TYPE 1.5 (HCC): Primary | ICD-10-CM

## 2025-04-22 RX ORDER — TIRZEPATIDE 5 MG/.5ML
5 INJECTION, SOLUTION SUBCUTANEOUS WEEKLY
Qty: 2 ML | Refills: 1 | Status: SHIPPED | OUTPATIENT
Start: 2025-04-22

## 2025-04-22 NOTE — TELEPHONE ENCOUNTER
Attempted to complete PA for Mounjaro but received notification that the medication is available without authorization. Sent in Mounjaro 5 mg once weekly to replace Wegovy 1 mg. Will monitor blood sugars closely for any necessary adjustments.     Orders Placed This Encounter    MOUNJARO 5 MG/0.5ML SOAJ     Sig: Inject 5 mg into the skin once a week     Dispense:  2 mL     Refill:  1          Thank you,  Daryl Torres, PHARMD, BCPS  Clinical Pharmacist   [facility]         For Pharmacy Admin Tracking Only    Program: Medical Group  CPA in place:  Yes  Recommendation Provided To: Patient/Caregiver: 2 via Telephone  Intervention Detail: Discontinued Rx: 1, reason: Cost/Formulary Change and New Rx: 1, reason: Cost/Formulary Change  Intervention Accepted By: Patient/Caregiver: 2  Gap Closed?: No   Time Spent (min): 20

## 2025-04-23 ENCOUNTER — CLINICAL DOCUMENTATION (OUTPATIENT)
Age: 60
End: 2025-04-23

## 2025-04-23 RX ORDER — IBUPROFEN 200 MG
TABLET ORAL
Qty: 100 EACH | Refills: 0 | Status: SHIPPED | OUTPATIENT
Start: 2025-04-23 | End: 2025-04-24

## 2025-04-23 NOTE — TELEPHONE ENCOUNTER
Orders Placed This Encounter    DISCONTD: INSULIN SYRINGE 1CC/29G (AIMSCO INSULIN SYR ULTRA THIN) 29G X 1/2\" 1 ML MISC     Sig: Use as directed to give with vitamin B12 shot every month. Please dispense brand covered by insurance.     Dispense:  100 each     Refill:  0    Insulin Syringe-Needle U-100 (BD INSULIN SYRINGE U/F) 31G X 5/16\" 1 ML MISC     Sig: Use as directed to give with vitamin b12 shot once per month     Dispense:  100 each     Refill:  0        Thank you,  Daryl Torres, PHARMD, BCPS  Clinical Pharmacist   [facility]         For Pharmacy Admin Tracking Only    Program: Medical Group  CPA in place:  Yes  Recommendation Provided To: Patient/Caregiver: 2 via [employer] Message  Intervention Detail: Discontinued Rx: 1, reason: Cost/Formulary Change and New Rx: 1, reason: Cost/Formulary Change  Intervention Accepted By: Patient/Caregiver: 2  Gap Closed?: No   Time Spent (min): 30

## 2025-04-24 RX ORDER — SYRINGE-NEEDLE,INSULIN,0.5 ML 27GX1/2"
SYRINGE, EMPTY DISPOSABLE MISCELLANEOUS
Qty: 100 EACH | Refills: 0 | Status: SHIPPED | OUTPATIENT
Start: 2025-04-24

## 2025-05-16 ENCOUNTER — PATIENT MESSAGE (OUTPATIENT)
Age: 60
End: 2025-05-16

## 2025-05-16 NOTE — TELEPHONE ENCOUNTER
For Pharmacy Admin Tracking Only    Program: Medical Group  CPA in place:  Yes  Recommendation Provided To: Patient/Caregiver: 0 via [employer] Message  Intervention Accepted By: Patient/Caregiver: 0  Gap Closed?: No   Time Spent (min): 20

## 2025-06-02 RX ORDER — IBUPROFEN 800 MG/1
TABLET, FILM COATED ORAL
Qty: 60 TABLET | Refills: 1 | Status: SHIPPED | OUTPATIENT
Start: 2025-06-02

## 2025-06-02 NOTE — TELEPHONE ENCOUNTER
Last appointment: 1/31/25  Next appointment: 7/15/25  Previous refill encounter(s): 4/2/25 #60 with 1 refill    Requested Prescriptions     Pending Prescriptions Disp Refills    ibuprofen (ADVIL;MOTRIN) 800 MG tablet [Pharmacy Med Name: IBUPROFEN 800 MG TABLET] 60 tablet 1     Sig: TAKE 1 TABLET BY MOUTH THREE TIMES A DAY AS NEEDED FOR PAIN         For Pharmacy Admin Tracking Only    Program: Medication Refill  CPA in place:    Recommendation Provided To:   Intervention Detail: New Rx: 1, reason: Patient Preference  Intervention Accepted By:   Gap Closed?:    Time Spent (min): 5   Responsibility to children, family, or others/Identifies reasons for living/Supportive social network of family or friends/Fear of death or the actual act of killing self

## 2025-06-12 DIAGNOSIS — E13.9 DIABETES MELLITUS TYPE 1.5 (HCC): ICD-10-CM

## 2025-06-13 ENCOUNTER — PATIENT MESSAGE (OUTPATIENT)
Age: 60
End: 2025-06-13

## 2025-06-13 DIAGNOSIS — E13.9 DIABETES MELLITUS TYPE 1.5 (HCC): Primary | ICD-10-CM

## 2025-06-13 RX ORDER — TIRZEPATIDE 5 MG/.5ML
INJECTION, SOLUTION SUBCUTANEOUS
Qty: 2 ML | Refills: 0 | Status: SHIPPED | OUTPATIENT
Start: 2025-06-13

## 2025-06-13 NOTE — TELEPHONE ENCOUNTER
Last appointment: 1/31/25  Next appointment: 7/15/25  Previous refill encounter(s): 4/22/25 #2ml with 1 refill    Requested Prescriptions     Pending Prescriptions Disp Refills    MOUNJARO 5 MG/0.5ML SOAJ pen [Pharmacy Med Name: MOUNJARO 5 MG/0.5 ML PEN] 2 mL 0     Sig: INJECT 5 MG SUBCUTANEOUSLY WEEKLY         For Pharmacy Admin Tracking Only    Program: Medication Refill  CPA in place:    Recommendation Provided To:   Intervention Detail: New Rx: 1, reason: Patient Preference  Intervention Accepted By:   Gap Closed?:    Time Spent (min): 5

## 2025-06-16 RX ORDER — TIRZEPATIDE 7.5 MG/.5ML
7.5 INJECTION, SOLUTION SUBCUTANEOUS WEEKLY
Qty: 2 ML | Refills: 0 | Status: SHIPPED | OUTPATIENT
Start: 2025-06-16

## 2025-06-16 NOTE — TELEPHONE ENCOUNTER
Orders Placed This Encounter    MOUNJARO 7.5 MG/0.5ML SOAJ pen     Sig: Inject 7.5 mg into the skin once a week     Dispense:  2 mL     Refill:  0        Thank you,  Daryl Torres, PHARMD, [facility]  Clinical Pharmacist   [facility]         For Pharmacy Admin Tracking Only    Program: Medical Group  CPA in place:  Yes  Recommendation Provided To: Patient/Caregiver: 2 via [email or web address] Message  Intervention Detail: Dose Adjustment: 1, reason: Therapy Optimization and New Rx: 1, reason: Needs Additional Therapy  Intervention Accepted By: Patient/Caregiver: 1  Gap Closed?: No   Time Spent (min): 10

## 2025-07-01 RX ORDER — EVOLOCUMAB 140 MG/ML
140 INJECTION, SOLUTION SUBCUTANEOUS
Qty: 6 ML | Refills: 3 | Status: ACTIVE | OUTPATIENT
Start: 2025-07-01

## 2025-07-01 NOTE — TELEPHONE ENCOUNTER
Last appointment: 1/31/25  Next appointment: 7/15/25  Previous refill encounter(s): 7/17/24    Requested Prescriptions     Pending Prescriptions Disp Refills    Evolocumab (REPATHA SURECLICK) SOAJ pen [Pharmacy Med Name: REPATHA 140 MG/ML SURECLICK] 6 mL 3     Sig: INJECT 140 MG INTO THE SKIN EVERY 14 DAYS         For Pharmacy Admin Tracking Only    Program: Medication Refill  CPA in place:    Recommendation Provided To:   Intervention Detail: New Rx: 1, reason: Patient Preference  Intervention Accepted By:   Gap Closed?:    Time Spent (min): 5

## 2025-07-15 ENCOUNTER — OFFICE VISIT (OUTPATIENT)
Age: 60
End: 2025-07-15
Payer: COMMERCIAL

## 2025-07-15 VITALS
WEIGHT: 161.2 LBS | DIASTOLIC BLOOD PRESSURE: 66 MMHG | HEART RATE: 68 BPM | TEMPERATURE: 97.9 F | RESPIRATION RATE: 26 BRPM | BODY MASS INDEX: 28.56 KG/M2 | OXYGEN SATURATION: 95 % | HEIGHT: 63 IN | SYSTOLIC BLOOD PRESSURE: 117 MMHG

## 2025-07-15 DIAGNOSIS — K21.9 GASTRO-ESOPHAGEAL REFLUX DISEASE WITHOUT ESOPHAGITIS: ICD-10-CM

## 2025-07-15 DIAGNOSIS — Z79.4 LONG TERM (CURRENT) USE OF INSULIN (HCC): ICD-10-CM

## 2025-07-15 DIAGNOSIS — Z96.41 INSULIN PUMP IN PLACE: ICD-10-CM

## 2025-07-15 DIAGNOSIS — E10.319 TYPE 1 DIABETES MELLITUS WITH RETINOPATHY OF RIGHT EYE WITHOUT MACULAR EDEMA, UNSPECIFIED RETINOPATHY SEVERITY (HCC): Primary | ICD-10-CM

## 2025-07-15 DIAGNOSIS — E78.2 MIXED HYPERLIPIDEMIA: ICD-10-CM

## 2025-07-15 DIAGNOSIS — Z79.899 ENCOUNTER FOR LONG-TERM (CURRENT) USE OF MEDICATIONS: ICD-10-CM

## 2025-07-15 DIAGNOSIS — I10 ESSENTIAL (PRIMARY) HYPERTENSION: ICD-10-CM

## 2025-07-15 LAB
GLUCOSE, POC: 181 MG/DL
HBA1C MFR BLD: 7 %

## 2025-07-15 PROCEDURE — 82962 GLUCOSE BLOOD TEST: CPT | Performed by: FAMILY MEDICINE

## 2025-07-15 PROCEDURE — 90471 IMMUNIZATION ADMIN: CPT | Performed by: FAMILY MEDICINE

## 2025-07-15 PROCEDURE — 90677 PCV20 VACCINE IM: CPT | Performed by: FAMILY MEDICINE

## 2025-07-15 PROCEDURE — 99214 OFFICE O/P EST MOD 30 MIN: CPT | Performed by: FAMILY MEDICINE

## 2025-07-15 PROCEDURE — 3074F SYST BP LT 130 MM HG: CPT | Performed by: FAMILY MEDICINE

## 2025-07-15 PROCEDURE — 3078F DIAST BP <80 MM HG: CPT | Performed by: FAMILY MEDICINE

## 2025-07-15 PROCEDURE — 83036 HEMOGLOBIN GLYCOSYLATED A1C: CPT | Performed by: FAMILY MEDICINE

## 2025-07-15 PROCEDURE — 3051F HG A1C>EQUAL 7.0%<8.0%: CPT | Performed by: FAMILY MEDICINE

## 2025-07-15 SDOH — ECONOMIC STABILITY: FOOD INSECURITY: WITHIN THE PAST 12 MONTHS, YOU WORRIED THAT YOUR FOOD WOULD RUN OUT BEFORE YOU GOT MONEY TO BUY MORE.: NEVER TRUE

## 2025-07-15 SDOH — ECONOMIC STABILITY: FOOD INSECURITY: WITHIN THE PAST 12 MONTHS, THE FOOD YOU BOUGHT JUST DIDN'T LAST AND YOU DIDN'T HAVE MONEY TO GET MORE.: NEVER TRUE

## 2025-07-15 ASSESSMENT — ENCOUNTER SYMPTOMS
COUGH: 0
BLOOD IN STOOL: 0
CONSTIPATION: 0
ABDOMINAL PAIN: 0
CHEST TIGHTNESS: 0
RHINORRHEA: 0
SHORTNESS OF BREATH: 0

## 2025-07-15 ASSESSMENT — PATIENT HEALTH QUESTIONNAIRE - PHQ9
SUM OF ALL RESPONSES TO PHQ QUESTIONS 1-9: 0
1. LITTLE INTEREST OR PLEASURE IN DOING THINGS: NOT AT ALL
SUM OF ALL RESPONSES TO PHQ QUESTIONS 1-9: 0
2. FEELING DOWN, DEPRESSED OR HOPELESS: NOT AT ALL
SUM OF ALL RESPONSES TO PHQ QUESTIONS 1-9: 0
SUM OF ALL RESPONSES TO PHQ QUESTIONS 1-9: 0

## 2025-07-15 ASSESSMENT — ANXIETY QUESTIONNAIRES: GAD7 TOTAL SCORE: 0

## 2025-07-15 NOTE — PROGRESS NOTES
Subjective:      Patient ID: Monica Lim is a 59 y.o. female.    HPI  Follow up on chronic medical problems.  Overall has been feeling well.      Cardiovascular Review:  The patient has hypertension and hyperlipidemia.  Diet and Lifestyle: generally follows a low fat low cholesterol diet, generally follows a low sodium diet, exercises sporadically, nonsmoker  Home BP Monitoring: is not measured at home.  Pertinent ROS: taking medications as instructed, no medication side effects noted, no TIA's, no chest pain on exertion, no dyspnea on exertion, no swelling of ankles, no palpitations, no muscle aches or pain.   Diabetes Mellitus:  She has diabetes mellitus type 1.5 dx at the age of 24.  She has retinopathy.  On ozempic and BS have improved overall.  Weight is up some from her last visit as she has not been able to get in as much exercise.      She is adjusting well to her insulin pump(has the omnipod).  BS had been improved and ranging in the low to mid 100s. No low BS noted.  She is adjusting the insulin with her pump and consulting with Daryl.  TIR at 52%.  Rarely has a low BS.     Diabetic ROS - medication compliance: compliant most of the time, diabetic diet compliance: compliant most of the time, home glucose monitoring: is performed regularly with the dexcome.  She has not been exercising recently.      Further diabetic ROS: no chest pain, dyspnea or TIA's, no numbness, has some  tingling in the extremities which has been stable.  Vision has been stable.     HM:  Mammo: 11/27/2024  Colonoscopy: 8/21/2019.  Repeat in 10 years.     Past Medical History:   Diagnosis Date    Diabetes (HCC)     Diabetic retinopathy (HCC)     right eye     Gastroesophageal reflux disease without esophagitis 05/26/2016    Gestational diabetes     Hypercholesterolemia     Hypertension     Type 1 diabetes (HCC)      Past Surgical History:   Procedure Laterality Date    BREAST REDUCTION SURGERY Bilateral 1984    CATARACT REMOVAL

## 2025-07-15 NOTE — PROGRESS NOTES
Chief Complaint   Patient presents with    4 Month Follow Up         Here for routine 4 month follow up.  No questions or concerns at this time.        \"Have you been to the ER, urgent care clinic since your last visit?  Hospitalized since your last visit?\"    NO    “Have you seen or consulted any other health care providers outside of [facility] since your last visit?”    NO            Click Here for Release of Records Request

## 2025-08-01 DIAGNOSIS — I10 ESSENTIAL (PRIMARY) HYPERTENSION: ICD-10-CM

## 2025-08-04 RX ORDER — AMLODIPINE BESYLATE 5 MG/1
10 TABLET ORAL DAILY
Qty: 180 TABLET | Refills: 0 | Status: SHIPPED | OUTPATIENT
Start: 2025-08-04

## 2025-08-11 RX ORDER — PROCHLORPERAZINE 25 MG/1
SUPPOSITORY RECTAL
Qty: 1 EACH | Refills: 3 | Status: SHIPPED | OUTPATIENT
Start: 2025-08-11

## 2025-08-16 DIAGNOSIS — E13.9 DIABETES MELLITUS TYPE 1.5 (HCC): ICD-10-CM

## 2025-08-18 RX ORDER — TIRZEPATIDE 7.5 MG/.5ML
INJECTION, SOLUTION SUBCUTANEOUS
Qty: 2 ML | Refills: 2 | Status: SHIPPED | OUTPATIENT
Start: 2025-08-18

## 2025-08-19 ENCOUNTER — TELEPHONE (OUTPATIENT)
Age: 60
End: 2025-08-19

## 2025-09-05 RX ORDER — IBUPROFEN 800 MG/1
TABLET, FILM COATED ORAL
Qty: 60 TABLET | Refills: 1 | Status: SHIPPED | OUTPATIENT
Start: 2025-09-05